# Patient Record
Sex: FEMALE | Race: WHITE | NOT HISPANIC OR LATINO | Employment: OTHER | URBAN - METROPOLITAN AREA
[De-identification: names, ages, dates, MRNs, and addresses within clinical notes are randomized per-mention and may not be internally consistent; named-entity substitution may affect disease eponyms.]

---

## 2017-01-12 ENCOUNTER — TELEPHONE (OUTPATIENT)
Dept: INTERNAL MEDICINE | Facility: CLINIC | Age: 82
End: 2017-01-12

## 2017-01-12 ENCOUNTER — CLINICAL SUPPORT (OUTPATIENT)
Dept: INTERNAL MEDICINE | Facility: CLINIC | Age: 82
End: 2017-01-12

## 2017-01-12 DIAGNOSIS — I48.91 ATRIAL FIBRILLATION, UNSPECIFIED TYPE (HCC): Primary | ICD-10-CM

## 2017-01-12 LAB — INR PPP: 3.2 (ref 2–3)

## 2017-01-12 PROCEDURE — 85610 PROTHROMBIN TIME: CPT | Performed by: INTERNAL MEDICINE

## 2017-01-23 RX ORDER — LOSARTAN POTASSIUM AND HYDROCHLOROTHIAZIDE 12.5; 1 MG/1; MG/1
TABLET ORAL
Qty: 30 TABLET | Refills: 0 | Status: SHIPPED | OUTPATIENT
Start: 2017-01-23 | End: 2017-02-18 | Stop reason: SDUPTHER

## 2017-01-23 RX ORDER — AMLODIPINE BESYLATE 5 MG/1
TABLET ORAL
Qty: 30 TABLET | Refills: 0 | Status: SHIPPED | OUTPATIENT
Start: 2017-01-23 | End: 2017-02-18 | Stop reason: SDUPTHER

## 2017-02-09 ENCOUNTER — CLINICAL SUPPORT (OUTPATIENT)
Dept: INTERNAL MEDICINE | Facility: CLINIC | Age: 82
End: 2017-02-09

## 2017-02-09 ENCOUNTER — TELEPHONE (OUTPATIENT)
Dept: INTERNAL MEDICINE | Facility: CLINIC | Age: 82
End: 2017-02-09

## 2017-02-09 DIAGNOSIS — I48.91 ATRIAL FIBRILLATION, UNSPECIFIED TYPE (HCC): Primary | ICD-10-CM

## 2017-02-09 LAB — INR PPP: 2.7 (ref 2–3)

## 2017-02-09 PROCEDURE — 85610 PROTHROMBIN TIME: CPT | Performed by: INTERNAL MEDICINE

## 2017-02-09 NOTE — TELEPHONE ENCOUNTER
She was in for her INR today and it was 2.7.  She is currently on 4 mg x 3 days and 3 mg the rest of the week.  She will stay on this dose and recheck in 4 weeks if that is ok with you.

## 2017-02-20 RX ORDER — LOSARTAN POTASSIUM AND HYDROCHLOROTHIAZIDE 12.5; 1 MG/1; MG/1
TABLET ORAL
Qty: 30 TABLET | Refills: 0 | Status: SHIPPED | OUTPATIENT
Start: 2017-02-20 | End: 2017-03-18 | Stop reason: SDUPTHER

## 2017-02-20 RX ORDER — AMLODIPINE BESYLATE 5 MG/1
TABLET ORAL
Qty: 30 TABLET | Refills: 0 | Status: SHIPPED | OUTPATIENT
Start: 2017-02-20 | End: 2017-03-18 | Stop reason: SDUPTHER

## 2017-02-20 RX ORDER — WARFARIN SODIUM 1 MG/1
TABLET ORAL
Qty: 108 TABLET | Refills: 0 | Status: SHIPPED | OUTPATIENT
Start: 2017-02-20 | End: 2017-02-27 | Stop reason: SDUPTHER

## 2017-02-27 RX ORDER — WARFARIN SODIUM 1 MG/1
1 TABLET ORAL
Qty: 108 TABLET | Refills: 0 | Status: SHIPPED | OUTPATIENT
Start: 2017-02-27 | End: 2017-04-21 | Stop reason: SDUPTHER

## 2017-03-09 ENCOUNTER — TELEPHONE (OUTPATIENT)
Dept: INTERNAL MEDICINE | Facility: CLINIC | Age: 82
End: 2017-03-09

## 2017-03-09 ENCOUNTER — CLINICAL SUPPORT (OUTPATIENT)
Dept: INTERNAL MEDICINE | Facility: CLINIC | Age: 82
End: 2017-03-09

## 2017-03-09 DIAGNOSIS — I48.91 ATRIAL FIBRILLATION, UNSPECIFIED TYPE (HCC): Primary | ICD-10-CM

## 2017-03-09 LAB — INR PPP: 2.6 (ref 2.5–3.5)

## 2017-03-09 PROCEDURE — 85610 PROTHROMBIN TIME: CPT | Performed by: INTERNAL MEDICINE

## 2017-03-09 NOTE — TELEPHONE ENCOUNTER
She was in today for INR and it was 2.6.  She is currently on 4 mg x 3 days and 3 mg the rest of the week.

## 2017-03-13 RX ORDER — PIOGLITAZONEHYDROCHLORIDE 15 MG/1
TABLET ORAL
Qty: 30 TABLET | Refills: 0 | Status: SHIPPED | OUTPATIENT
Start: 2017-03-13 | End: 2017-04-08 | Stop reason: SDUPTHER

## 2017-03-20 RX ORDER — LOSARTAN POTASSIUM AND HYDROCHLOROTHIAZIDE 12.5; 1 MG/1; MG/1
TABLET ORAL
Qty: 15 TABLET | Refills: 0 | Status: SHIPPED | OUTPATIENT
Start: 2017-03-20 | End: 2017-04-12 | Stop reason: SDUPTHER

## 2017-03-20 RX ORDER — AMLODIPINE BESYLATE 5 MG/1
TABLET ORAL
Qty: 15 TABLET | Refills: 0 | Status: SHIPPED | OUTPATIENT
Start: 2017-03-20 | End: 2017-04-12 | Stop reason: SDUPTHER

## 2017-04-05 RX ORDER — LEVOTHYROXINE SODIUM 0.03 MG/1
TABLET ORAL
Qty: 90 TABLET | Refills: 0 | Status: SHIPPED | OUTPATIENT
Start: 2017-04-05 | End: 2017-04-22

## 2017-04-06 ENCOUNTER — TELEPHONE (OUTPATIENT)
Dept: INTERNAL MEDICINE | Facility: CLINIC | Age: 82
End: 2017-04-06

## 2017-04-06 ENCOUNTER — CLINICAL SUPPORT (OUTPATIENT)
Dept: INTERNAL MEDICINE | Facility: CLINIC | Age: 82
End: 2017-04-06

## 2017-04-06 DIAGNOSIS — I48.91 ATRIAL FIBRILLATION, UNSPECIFIED TYPE (HCC): Primary | ICD-10-CM

## 2017-04-06 LAB — INR PPP: 2 (ref 2.5–3.5)

## 2017-04-06 PROCEDURE — 85610 PROTHROMBIN TIME: CPT | Performed by: INTERNAL MEDICINE

## 2017-04-06 NOTE — TELEPHONE ENCOUNTER
Yes we can leave dose the same and recheck when she comes in later this month - I see she has appt 4/21 - if she can come in fasting then we can do her fasting labs

## 2017-04-06 NOTE — TELEPHONE ENCOUNTER
She was in today for her INR and it was 2.0.  She is currently on 4 mg x 3 days and 3 mg x 4 days.  I let her know to continue same dose until I hear from you and I will give her a call.

## 2017-04-10 RX ORDER — PIOGLITAZONEHYDROCHLORIDE 15 MG/1
TABLET ORAL
Qty: 30 TABLET | Refills: 0 | Status: SHIPPED | OUTPATIENT
Start: 2017-04-10 | End: 2017-04-22 | Stop reason: SDUPTHER

## 2017-04-12 RX ORDER — LOSARTAN POTASSIUM AND HYDROCHLOROTHIAZIDE 12.5; 1 MG/1; MG/1
TABLET ORAL
Qty: 30 TABLET | Refills: 0 | Status: SHIPPED | OUTPATIENT
Start: 2017-04-12 | End: 2017-04-21 | Stop reason: SDUPTHER

## 2017-04-12 RX ORDER — AMLODIPINE BESYLATE 5 MG/1
TABLET ORAL
Qty: 30 TABLET | Refills: 0 | Status: SHIPPED | OUTPATIENT
Start: 2017-04-12 | End: 2017-04-21 | Stop reason: SDUPTHER

## 2017-04-12 RX ORDER — ATORVASTATIN CALCIUM 40 MG/1
TABLET, FILM COATED ORAL
Qty: 30 TABLET | Refills: 0 | Status: SHIPPED | OUTPATIENT
Start: 2017-04-12 | End: 2017-04-22 | Stop reason: SDUPTHER

## 2017-04-13 RX ORDER — DIGOXIN 250 MCG
TABLET ORAL
Qty: 30 TABLET | Refills: 0 | Status: SHIPPED | OUTPATIENT
Start: 2017-04-13 | End: 2017-04-21 | Stop reason: SDUPTHER

## 2017-04-20 RX ORDER — DIGOXIN 250 MCG
TABLET ORAL
Qty: 90 TABLET | Refills: 1 | OUTPATIENT
Start: 2017-04-20

## 2017-04-21 ENCOUNTER — OFFICE VISIT (OUTPATIENT)
Dept: INTERNAL MEDICINE | Facility: CLINIC | Age: 82
End: 2017-04-21

## 2017-04-21 VITALS — TEMPERATURE: 98.3 F | DIASTOLIC BLOOD PRESSURE: 62 MMHG | HEART RATE: 88 BPM | SYSTOLIC BLOOD PRESSURE: 136 MMHG

## 2017-04-21 DIAGNOSIS — I10 ESSENTIAL HYPERTENSION, BENIGN: ICD-10-CM

## 2017-04-21 DIAGNOSIS — I48.91 ATRIAL FIBRILLATION, UNSPECIFIED TYPE (HCC): Primary | ICD-10-CM

## 2017-04-21 DIAGNOSIS — E11.9 DIABETES MELLITUS WITHOUT COMPLICATION (HCC): ICD-10-CM

## 2017-04-21 DIAGNOSIS — E78.00 PURE HYPERCHOLESTEROLEMIA: ICD-10-CM

## 2017-04-21 LAB
ALBUMIN SERPL-MCNC: 4.2 G/DL (ref 3.2–4.8)
ALBUMIN/GLOB SERPL: 1.4 G/DL (ref 1.5–2.5)
ALP SERPL-CCNC: 69 U/L (ref 25–100)
ALT SERPL W P-5'-P-CCNC: 16 U/L (ref 7–40)
ANION GAP SERPL CALCULATED.3IONS-SCNC: 9 MMOL/L (ref 3–11)
ARTICHOKE IGE QN: 72 MG/DL (ref 0–130)
AST SERPL-CCNC: 18 U/L (ref 0–33)
BASOPHILS # BLD AUTO: 0.06 10*3/MM3 (ref 0–0.2)
BASOPHILS NFR BLD AUTO: 0.7 % (ref 0–1)
BILIRUB SERPL-MCNC: 0.4 MG/DL (ref 0.3–1.2)
BUN BLD-MCNC: 20 MG/DL (ref 9–23)
BUN/CREAT SERPL: 33.3 (ref 7–25)
CALCIUM SPEC-SCNC: 9.7 MG/DL (ref 8.7–10.4)
CHLORIDE SERPL-SCNC: 98 MMOL/L (ref 99–109)
CHOLEST SERPL-MCNC: 142 MG/DL (ref 0–200)
CO2 SERPL-SCNC: 31 MMOL/L (ref 20–31)
CREAT BLD-MCNC: 0.6 MG/DL (ref 0.6–1.3)
DEPRECATED RDW RBC AUTO: 48.4 FL (ref 37–54)
EOSINOPHIL # BLD AUTO: 0.23 10*3/MM3 (ref 0.1–0.3)
EOSINOPHIL NFR BLD AUTO: 2.6 % (ref 0–3)
ERYTHROCYTE [DISTWIDTH] IN BLOOD BY AUTOMATED COUNT: 13.9 % (ref 11.3–14.5)
GFR SERPL CREATININE-BSD FRML MDRD: 96 ML/MIN/1.73
GLOBULIN UR ELPH-MCNC: 2.9 GM/DL
GLUCOSE BLD-MCNC: 107 MG/DL (ref 70–100)
HBA1C MFR BLD: 5.9 % (ref 4.8–5.6)
HCT VFR BLD AUTO: 38 % (ref 34.5–44)
HDLC SERPL-MCNC: 38 MG/DL (ref 40–60)
HGB BLD-MCNC: 12.1 G/DL (ref 11.5–15.5)
IMM GRANULOCYTES # BLD: 0.02 10*3/MM3 (ref 0–0.03)
IMM GRANULOCYTES NFR BLD: 0.2 % (ref 0–0.6)
INR PPP: 2.6 (ref 2–3)
LYMPHOCYTES # BLD AUTO: 2.96 10*3/MM3 (ref 0.6–4.8)
LYMPHOCYTES NFR BLD AUTO: 33.4 % (ref 24–44)
MCH RBC QN AUTO: 30.4 PG (ref 27–31)
MCHC RBC AUTO-ENTMCNC: 31.8 G/DL (ref 32–36)
MCV RBC AUTO: 95.5 FL (ref 80–99)
MONOCYTES # BLD AUTO: 0.74 10*3/MM3 (ref 0–1)
MONOCYTES NFR BLD AUTO: 8.4 % (ref 0–12)
NEUTROPHILS # BLD AUTO: 4.85 10*3/MM3 (ref 1.5–8.3)
NEUTROPHILS NFR BLD AUTO: 54.7 % (ref 41–71)
PLATELET # BLD AUTO: 368 10*3/MM3 (ref 150–450)
PMV BLD AUTO: 9.8 FL (ref 6–12)
POTASSIUM BLD-SCNC: 3.8 MMOL/L (ref 3.5–5.5)
PROT SERPL-MCNC: 7.1 G/DL (ref 5.7–8.2)
RBC # BLD AUTO: 3.98 10*6/MM3 (ref 3.89–5.14)
SODIUM BLD-SCNC: 138 MMOL/L (ref 132–146)
TRIGL SERPL-MCNC: 154 MG/DL (ref 0–150)
TSH SERPL DL<=0.05 MIU/L-ACNC: 5.54 MIU/ML (ref 0.35–5.35)
WBC NRBC COR # BLD: 8.86 10*3/MM3 (ref 3.5–10.8)

## 2017-04-21 PROCEDURE — 99214 OFFICE O/P EST MOD 30 MIN: CPT | Performed by: INTERNAL MEDICINE

## 2017-04-21 PROCEDURE — 83036 HEMOGLOBIN GLYCOSYLATED A1C: CPT | Performed by: INTERNAL MEDICINE

## 2017-04-21 PROCEDURE — 84443 ASSAY THYROID STIM HORMONE: CPT | Performed by: INTERNAL MEDICINE

## 2017-04-21 PROCEDURE — 80061 LIPID PANEL: CPT | Performed by: INTERNAL MEDICINE

## 2017-04-21 PROCEDURE — 85025 COMPLETE CBC W/AUTO DIFF WBC: CPT | Performed by: INTERNAL MEDICINE

## 2017-04-21 PROCEDURE — 85610 PROTHROMBIN TIME: CPT | Performed by: INTERNAL MEDICINE

## 2017-04-21 PROCEDURE — 80053 COMPREHEN METABOLIC PANEL: CPT | Performed by: INTERNAL MEDICINE

## 2017-04-21 RX ORDER — DIGOXIN 250 MCG
250 TABLET ORAL
Qty: 90 TABLET | Refills: 1 | Status: SHIPPED | OUTPATIENT
Start: 2017-04-21 | End: 2017-10-12 | Stop reason: SDUPTHER

## 2017-04-21 RX ORDER — WARFARIN SODIUM 1 MG/1
1 TABLET ORAL
Qty: 108 TABLET | Refills: 5 | Status: SHIPPED | OUTPATIENT
Start: 2017-04-21 | End: 2017-10-19 | Stop reason: SDUPTHER

## 2017-04-21 RX ORDER — LOSARTAN POTASSIUM AND HYDROCHLOROTHIAZIDE 12.5; 1 MG/1; MG/1
1 TABLET ORAL DAILY
Qty: 90 TABLET | Refills: 1 | Status: SHIPPED | OUTPATIENT
Start: 2017-04-21 | End: 2017-10-18

## 2017-04-21 RX ORDER — PIOGLITAZONEHYDROCHLORIDE 15 MG/1
15 TABLET ORAL DAILY
Qty: 90 TABLET | Refills: 1 | Status: CANCELLED | OUTPATIENT
Start: 2017-04-21

## 2017-04-21 RX ORDER — ATORVASTATIN CALCIUM 40 MG/1
40 TABLET, FILM COATED ORAL NIGHTLY
Qty: 90 TABLET | Refills: 1 | Status: CANCELLED | OUTPATIENT
Start: 2017-04-21

## 2017-04-21 RX ORDER — AMLODIPINE BESYLATE 5 MG/1
5 TABLET ORAL DAILY
Qty: 90 TABLET | Refills: 1 | Status: SHIPPED | OUTPATIENT
Start: 2017-04-21 | End: 2017-09-08

## 2017-04-21 NOTE — PROGRESS NOTES
Subjective   Kristin Magdaleno is a 82 y.o. female.     History of Present Illness     H/o cva - on coumadin longterm. No new problems     DMT2 - off glyburide now and feeling ok     htn - no problems. Occasionally some swelling at end of day but not bad    She did have skin lesions removed off knee, and one on  left arm that was a skin cancer        The following portions of the patient's history were reviewed and updated as appropriate: allergies, current medications, past family history, past medical history, past social history, past surgical history and problem list.    Review of Systems   Constitutional: Positive for appetite change (slightly decreased appetite, food doesn't taste as good). Negative for activity change, fever and unexpected weight change.   Eyes: Positive for visual disturbance (macular deg. had cataract surgery but not much improvement).   Respiratory: Negative for shortness of breath.    Cardiovascular: Negative for chest pain and leg swelling.   Skin:        Skin lesions   Neurological: Positive for weakness (r sided from CVA).       Objective   Blood pressure 136/62, pulse 88, temperature 98.3 °F (36.8 °C), temperature source Temporal Artery .  Physical Exam   Constitutional: She is oriented to person, place, and time. She appears well-developed and well-nourished. No distress.   HENT:   Head: Normocephalic and atraumatic.   Eyes: Conjunctivae and EOM are normal.   Cardiovascular: Normal rate and regular rhythm.  Exam reveals no gallop and no friction rub.    Murmur heard.  Pulmonary/Chest: Effort normal and breath sounds normal. No respiratory distress. She has no wheezes.   Musculoskeletal: She exhibits no edema.   Neurological: She is alert and oriented to person, place, and time.   r sided weakness   Skin: Skin is warm and dry. She is not diaphoretic.   Psychiatric: She has a normal mood and affect. Her behavior is normal. Judgment and thought content normal.       Assessment/Plan    Kristin was seen today for med refill and coagulation disorder.    Diagnoses and all orders for this visit:    Atrial fibrillation, unspecified type  -     POC INR  -     TSH    Essential hypertension, benign  -     CBC & Differential  -     CBC Auto Differential    Diabetes mellitus without complication  -     Hemoglobin A1c    Pure hypercholesterolemia  -     Comprehensive Metabolic Panel  -     Lipid Panel    Other orders  -     warfarin (COUMADIN) 1 MG tablet; Take 1 tablet by mouth Daily. Takes 4 tablets on 3 days a week and 3 tabs 4 days a week.  -     Cancel: pioglitazone (ACTOS) 15 MG tablet; Take 1 tablet by mouth Daily.  -     Cancel: metFORMIN (GLUCOPHAGE) 1000 MG tablet; Take 2 tablets by mouth 2 (Two) Times a Day With Meals.  -     losartan-hydrochlorothiazide (HYZAAR) 100-12.5 MG per tablet; Take 1 tablet by mouth Daily.  -     digoxin (LANOXIN) 250 MCG tablet; Take 1 tablet by mouth Daily.  -     Cancel: atorvastatin (LIPITOR) 40 MG tablet; Take 1 tablet by mouth Every Night.  -     amLODIPine (NORVASC) 5 MG tablet; Take 1 tablet by mouth Daily.      vaccinationsa re UTD and she declines mams

## 2017-04-22 RX ORDER — ATORVASTATIN CALCIUM 40 MG/1
40 TABLET, FILM COATED ORAL DAILY
Qty: 90 TABLET | Refills: 1 | Status: SHIPPED | OUTPATIENT
Start: 2017-04-22 | End: 2017-10-19 | Stop reason: SDUPTHER

## 2017-04-22 RX ORDER — PIOGLITAZONEHYDROCHLORIDE 15 MG/1
15 TABLET ORAL DAILY
Qty: 90 TABLET | Refills: 1 | Status: SHIPPED | OUTPATIENT
Start: 2017-04-22 | End: 2017-10-19 | Stop reason: SDUPTHER

## 2017-04-22 RX ORDER — LEVOTHYROXINE SODIUM 0.05 MG/1
50 TABLET ORAL DAILY
Qty: 90 TABLET | Refills: 0 | Status: SHIPPED | OUTPATIENT
Start: 2017-04-22 | End: 2017-07-19 | Stop reason: SDUPTHER

## 2017-04-27 RX ORDER — WARFARIN SODIUM 1 MG/1
TABLET ORAL
Qty: 108 TABLET | Refills: 0 | OUTPATIENT
Start: 2017-04-27

## 2017-05-18 ENCOUNTER — TELEPHONE (OUTPATIENT)
Dept: INTERNAL MEDICINE | Facility: CLINIC | Age: 82
End: 2017-05-18

## 2017-05-18 ENCOUNTER — CLINICAL SUPPORT (OUTPATIENT)
Dept: INTERNAL MEDICINE | Facility: CLINIC | Age: 82
End: 2017-05-18

## 2017-05-18 DIAGNOSIS — I48.91 ATRIAL FIBRILLATION, UNSPECIFIED TYPE (HCC): Primary | ICD-10-CM

## 2017-05-18 LAB — INR PPP: 2.6 (ref 2–3)

## 2017-05-18 PROCEDURE — 85610 PROTHROMBIN TIME: CPT | Performed by: INTERNAL MEDICINE

## 2017-06-15 ENCOUNTER — CLINICAL SUPPORT (OUTPATIENT)
Dept: INTERNAL MEDICINE | Facility: CLINIC | Age: 82
End: 2017-06-15

## 2017-06-15 ENCOUNTER — TELEPHONE (OUTPATIENT)
Dept: INTERNAL MEDICINE | Facility: CLINIC | Age: 82
End: 2017-06-15

## 2017-06-15 DIAGNOSIS — I48.91 ATRIAL FIBRILLATION, UNSPECIFIED TYPE (HCC): Primary | ICD-10-CM

## 2017-06-15 LAB — INR PPP: 2.1 (ref 2–3)

## 2017-06-15 PROCEDURE — 85610 PROTHROMBIN TIME: CPT | Performed by: INTERNAL MEDICINE

## 2017-07-13 ENCOUNTER — TELEPHONE (OUTPATIENT)
Dept: INTERNAL MEDICINE | Facility: CLINIC | Age: 82
End: 2017-07-13

## 2017-07-13 ENCOUNTER — CLINICAL SUPPORT (OUTPATIENT)
Dept: INTERNAL MEDICINE | Facility: CLINIC | Age: 82
End: 2017-07-13

## 2017-07-13 LAB — INR PPP: 2.2 (ref 2.5–3.5)

## 2017-07-13 PROCEDURE — 85610 PROTHROMBIN TIME: CPT | Performed by: INTERNAL MEDICINE

## 2017-07-13 NOTE — TELEPHONE ENCOUNTER
----- Message from Maryann Guillaume MD sent at 7/13/2017  9:27 AM EDT -----  Regarding: RE: INR  No that sounds good, thanks  ----- Message -----     From: Elodia Padilla MA     Sent: 7/13/2017   9:21 AM       To: Maryann Guillaume MD  Subject: INR                                              Patient present for INR visit. INR is 2.2. She has been taking 4mg x3 days and 3mg the rest of the week. I told her to continue same does and recheck again in 4 weeks unless you want to change anything and then I will give them a call

## 2017-07-20 RX ORDER — LEVOTHYROXINE SODIUM 0.05 MG/1
TABLET ORAL
Qty: 30 TABLET | Refills: 0 | Status: SHIPPED | OUTPATIENT
Start: 2017-07-20 | End: 2017-08-10

## 2017-08-10 ENCOUNTER — CLINICAL SUPPORT (OUTPATIENT)
Dept: INTERNAL MEDICINE | Facility: CLINIC | Age: 82
End: 2017-08-10

## 2017-08-10 DIAGNOSIS — Z79.899 ENCOUNTER FOR LONG-TERM (CURRENT) USE OF MEDICATIONS: ICD-10-CM

## 2017-08-10 DIAGNOSIS — I48.91 ATRIAL FIBRILLATION, UNSPECIFIED TYPE (HCC): Primary | ICD-10-CM

## 2017-08-10 DIAGNOSIS — E03.9 HYPOTHYROIDISM, UNSPECIFIED TYPE: ICD-10-CM

## 2017-08-10 LAB
ALBUMIN SERPL-MCNC: 4.1 G/DL (ref 3.2–4.8)
ALBUMIN/GLOB SERPL: 1.5 G/DL (ref 1.5–2.5)
ALP SERPL-CCNC: 74 U/L (ref 25–100)
ALT SERPL W P-5'-P-CCNC: 17 U/L (ref 7–40)
ANION GAP SERPL CALCULATED.3IONS-SCNC: 6 MMOL/L (ref 3–11)
AST SERPL-CCNC: 22 U/L (ref 0–33)
BILIRUB SERPL-MCNC: 0.4 MG/DL (ref 0.3–1.2)
BUN BLD-MCNC: 21 MG/DL (ref 9–23)
BUN/CREAT SERPL: 30 (ref 7–25)
CALCIUM SPEC-SCNC: 9.4 MG/DL (ref 8.7–10.4)
CHLORIDE SERPL-SCNC: 102 MMOL/L (ref 99–109)
CO2 SERPL-SCNC: 32 MMOL/L (ref 20–31)
CREAT BLD-MCNC: 0.7 MG/DL (ref 0.6–1.3)
GFR SERPL CREATININE-BSD FRML MDRD: 81 ML/MIN/1.73
GLOBULIN UR ELPH-MCNC: 2.8 GM/DL
GLUCOSE BLD-MCNC: 98 MG/DL (ref 70–100)
INR PPP: 2.6 (ref 2.5–3.5)
POTASSIUM BLD-SCNC: 4.1 MMOL/L (ref 3.5–5.5)
PROT SERPL-MCNC: 6.9 G/DL (ref 5.7–8.2)
SODIUM BLD-SCNC: 140 MMOL/L (ref 132–146)
TSH SERPL DL<=0.05 MIU/L-ACNC: 5.55 MIU/ML (ref 0.35–5.35)

## 2017-08-10 PROCEDURE — 80053 COMPREHEN METABOLIC PANEL: CPT | Performed by: INTERNAL MEDICINE

## 2017-08-10 PROCEDURE — 84443 ASSAY THYROID STIM HORMONE: CPT | Performed by: INTERNAL MEDICINE

## 2017-08-10 PROCEDURE — 85610 PROTHROMBIN TIME: CPT | Performed by: INTERNAL MEDICINE

## 2017-08-10 RX ORDER — LEVOTHYROXINE SODIUM 0.07 MG/1
75 TABLET ORAL DAILY
Qty: 90 TABLET | Refills: 0 | Status: SHIPPED | OUTPATIENT
Start: 2017-08-10 | End: 2017-09-08

## 2017-08-11 ENCOUNTER — TELEPHONE (OUTPATIENT)
Dept: INTERNAL MEDICINE | Facility: CLINIC | Age: 82
End: 2017-08-11

## 2017-08-11 NOTE — TELEPHONE ENCOUNTER
----- Message from Maryann Guillaume MD sent at 8/10/2017 10:26 PM EDT -----  Can you let her know, the thyroid level is still a little low, so I raised the thyroid to 75, and we can recheck all her fasting labs in 3 months

## 2017-08-30 ENCOUNTER — TELEPHONE (OUTPATIENT)
Dept: INTERNAL MEDICINE | Facility: CLINIC | Age: 82
End: 2017-08-30

## 2017-08-30 NOTE — TELEPHONE ENCOUNTER
[facility] called stating that Ms. Magdaleno legs are swollen and she does not know if it is because the increase of levothyroxine ,she would like a call back regarding matter, she is scheduled to come in 9/8/2017.

## 2017-08-30 NOTE — TELEPHONE ENCOUNTER
Sara said her legs and feet are swelling.  She seen podiatry this morning Dr. James and he removed her toe nail due to fluid build up.  Sara is wondering if she should be decreased on the levothyroxine or stop it completely.

## 2017-09-08 ENCOUNTER — OFFICE VISIT (OUTPATIENT)
Dept: INTERNAL MEDICINE | Facility: CLINIC | Age: 82
End: 2017-09-08

## 2017-09-08 VITALS
TEMPERATURE: 98.3 F | DIASTOLIC BLOOD PRESSURE: 76 MMHG | OXYGEN SATURATION: 97 % | HEART RATE: 94 BPM | SYSTOLIC BLOOD PRESSURE: 128 MMHG

## 2017-09-08 DIAGNOSIS — R73.09 ELEVATED GLUCOSE: ICD-10-CM

## 2017-09-08 DIAGNOSIS — Z23 NEED FOR TDAP VACCINATION: ICD-10-CM

## 2017-09-08 DIAGNOSIS — R60.0 BILATERAL EDEMA OF LOWER EXTREMITY: Primary | ICD-10-CM

## 2017-09-08 DIAGNOSIS — I48.20 CHRONIC ATRIAL FIBRILLATION (HCC): ICD-10-CM

## 2017-09-08 DIAGNOSIS — I87.2 VENOUS STASIS DERMATITIS OF BOTH LOWER EXTREMITIES: ICD-10-CM

## 2017-09-08 DIAGNOSIS — Z23 NEED FOR INFLUENZA VACCINATION: ICD-10-CM

## 2017-09-08 DIAGNOSIS — I10 ESSENTIAL HYPERTENSION: ICD-10-CM

## 2017-09-08 LAB
HBA1C MFR BLD: 5.9 %
INR PPP: 2.3 (ref 2–3)

## 2017-09-08 PROCEDURE — G0008 ADMIN INFLUENZA VIRUS VAC: HCPCS | Performed by: INTERNAL MEDICINE

## 2017-09-08 PROCEDURE — 85610 PROTHROMBIN TIME: CPT | Performed by: INTERNAL MEDICINE

## 2017-09-08 PROCEDURE — 90662 IIV NO PRSV INCREASED AG IM: CPT | Performed by: INTERNAL MEDICINE

## 2017-09-08 PROCEDURE — 83036 HEMOGLOBIN GLYCOSYLATED A1C: CPT | Performed by: INTERNAL MEDICINE

## 2017-09-08 PROCEDURE — 99214 OFFICE O/P EST MOD 30 MIN: CPT | Performed by: INTERNAL MEDICINE

## 2017-09-08 RX ORDER — HYDROCHLOROTHIAZIDE 12.5 MG/1
12.5 CAPSULE, GELATIN COATED ORAL DAILY
Qty: 30 CAPSULE | Refills: 5 | Status: SHIPPED | OUTPATIENT
Start: 2017-09-08 | End: 2017-10-18

## 2017-09-08 NOTE — PROGRESS NOTES
Subjective   Kristin Magdaleno is a 82 y.o. female.     Leg Swelling   This is a new problem. Episode onset: over last 3-4 months. The problem occurs constantly. The problem has been gradually worsening. Pertinent negatives include no chest pain or coughing. She has tried nothing for the symptoms.   no CP or palpitations, no increase in SOB, but she does very little physically - on couch or wheelchair most of the time. No paroxysmal nocturnal dyspnea, but she has slept upright on couch since her stroke because she feels like she would smother if she slept flat. She props up her legs on a chair. She did buy compression stockings but can't wear them - were too hard to put on.     htn - has not checked BP in some time. Does have a cuff though    Hypothyroid - she never has had any symptoms of hypothyroid. Her daughter feels like her weight is down and pt doesn't feel any better in terms of energy on it      The following portions of the patient's history were reviewed and updated as appropriate: allergies, current medications, past family history, past medical history, past social history, past surgical history and problem list.    Review of Systems   Constitutional: Positive for unexpected weight change (doesn't weigh, but daughter feels like hse has lost weight). Negative for activity change and appetite change.   Respiratory: Negative for cough, chest tightness, shortness of breath and wheezing.    Cardiovascular: Positive for leg swelling. Negative for chest pain and palpitations.       Objective   Blood pressure 128/76, pulse 94, temperature 98.3 °F (36.8 °C), temperature source Temporal Artery , SpO2 97 %.  Physical Exam   Constitutional: She is oriented to person, place, and time. She appears well-developed and well-nourished. No distress.   HENT:   Head: Normocephalic and atraumatic.   Eyes: Conjunctivae and EOM are normal.   Cardiovascular: Normal rate and regular rhythm.  Exam reveals no gallop and no friction  rub.    Murmur heard.  Pulmonary/Chest: Effort normal and breath sounds normal. No respiratory distress. She has no wheezes.   Abdominal: Soft. Bowel sounds are normal.   Musculoskeletal: She exhibits edema (2 + pitting edema B).   Neurological: She is alert and oriented to person, place, and time.   r sided weakness   Skin: Skin is warm and dry. She is not diaphoretic. There is erythema (B shins).   Psychiatric: She has a normal mood and affect. Her behavior is normal. Judgment and thought content normal.   pulse 100, regular  a1c - 5.9    Assessment/Plan   Kristin was seen today for leg swelling and atrial fibrillation.    Diagnoses and all orders for this visit:    Bilateral edema of lower extremity -Will hold the amlodipoine and add HCTZ 12.5 daily to the losartan/hctz. Will have her get a recliner and try to keep legs propped up more both during the day and at night. RTC in 1 month and if no better, we may stop the actos as well. We discussed getting echo, but she declines - she does not want any testing done other than labs      Need for influenza vaccination  -     Flu Vaccine High Dose PF 65YR+ (FLUZONE [id number])  -     POC INR    Elevated glucose  -     POC Glycosylated Hemoglobin (Hb A1C)    Venous stasis dermatitis of both lower extremities - will use lidex    Essential hypertension - try to monitor 2-3x/week w/ changes we are making      She wants tdap as she has a great grandbaby on the way, but w/ medicare she will get at pharmacy

## 2017-09-15 ENCOUNTER — TELEPHONE (OUTPATIENT)
Dept: INTERNAL MEDICINE | Facility: CLINIC | Age: 82
End: 2017-09-15

## 2017-09-15 NOTE — TELEPHONE ENCOUNTER
----- Message from Maryann Guillaume MD sent at 9/15/2017  7:09 AM EDT -----  Regarding: bactrim  We got a message that she has been started on Bactrim by her podiatrist. This can interact w/ her coumading. Can she come in today for an INR?

## 2017-09-15 NOTE — TELEPHONE ENCOUNTER
Mrs. Magdaleno said she was not started on any antibiotic.  She said Sara called the podiatrist and he said she did not need one.  I did not see a number for Sara in the chart.

## 2017-09-19 ENCOUNTER — TELEPHONE (OUTPATIENT)
Dept: INTERNAL MEDICINE | Facility: CLINIC | Age: 82
End: 2017-09-19

## 2017-09-19 NOTE — TELEPHONE ENCOUNTER
Spoke with  and he said there is not anything interacting with her warfarin.  I have noted the TDAP in the chart.

## 2017-09-19 NOTE — TELEPHONE ENCOUNTER
PT HAD TDAP DONE OVER WEEKEND AT [facility], ALSO DAUGHTER WOULD LIKE A CALL BACK SOMETHING IS INTERACTING WITH MRS CHRISSY BUTT AND SHE DOES NOT KNOW WHAT TO DO.

## 2017-10-04 ENCOUNTER — TELEPHONE (OUTPATIENT)
Dept: INTERNAL MEDICINE | Facility: CLINIC | Age: 82
End: 2017-10-04

## 2017-10-04 ENCOUNTER — CLINICAL SUPPORT (OUTPATIENT)
Dept: INTERNAL MEDICINE | Facility: CLINIC | Age: 82
End: 2017-10-04

## 2017-10-04 LAB — INR PPP: 2 (ref 2.5–3.5)

## 2017-10-04 PROCEDURE — 85610 PROTHROMBIN TIME: CPT | Performed by: INTERNAL MEDICINE

## 2017-10-04 NOTE — TELEPHONE ENCOUNTER
----- Message from Maryann Guillaume MD sent at 10/4/2017  9:44 AM EDT -----  No change, recheck in 4 weeks  ----- Message -----     From: Elodia Padilla MA     Sent: 10/4/2017   9:38 AM       To: Maryann Guillaume MD    Patient came in today for her INR check. INR is 2.0. She has been taking 4mg 3 days a week and 3mg 4 days a week. How would you like to proceed?

## 2017-10-12 RX ORDER — DIGOXIN 250 MCG
TABLET ORAL
Qty: 90 TABLET | Refills: 1 | Status: SHIPPED | OUTPATIENT
Start: 2017-10-12 | End: 2018-01-01 | Stop reason: SDUPTHER

## 2017-10-16 ENCOUNTER — TELEPHONE (OUTPATIENT)
Dept: INTERNAL MEDICINE | Facility: CLINIC | Age: 82
End: 2017-10-16

## 2017-10-16 NOTE — TELEPHONE ENCOUNTER
Pt has appt [id number] her daughter called wanting to know if she has to fast please call and let her know

## 2017-10-18 ENCOUNTER — OFFICE VISIT (OUTPATIENT)
Dept: INTERNAL MEDICINE | Facility: CLINIC | Age: 82
End: 2017-10-18

## 2017-10-18 VITALS
DIASTOLIC BLOOD PRESSURE: 70 MMHG | SYSTOLIC BLOOD PRESSURE: 178 MMHG | TEMPERATURE: 98 F | HEART RATE: 94 BPM | OXYGEN SATURATION: 93 %

## 2017-10-18 DIAGNOSIS — E78.00 PURE HYPERCHOLESTEROLEMIA: ICD-10-CM

## 2017-10-18 DIAGNOSIS — I10 ESSENTIAL HYPERTENSION, BENIGN: Primary | ICD-10-CM

## 2017-10-18 DIAGNOSIS — E03.8 OTHER SPECIFIED HYPOTHYROIDISM: ICD-10-CM

## 2017-10-18 LAB
ALBUMIN SERPL-MCNC: 4.3 G/DL (ref 3.2–4.8)
ALBUMIN/GLOB SERPL: 1.5 G/DL (ref 1.5–2.5)
ALP SERPL-CCNC: 73 U/L (ref 25–100)
ALT SERPL W P-5'-P-CCNC: 12 U/L (ref 7–40)
ANION GAP SERPL CALCULATED.3IONS-SCNC: 7 MMOL/L (ref 3–11)
ARTICHOKE IGE QN: 77 MG/DL (ref 0–130)
AST SERPL-CCNC: 15 U/L (ref 0–33)
BASOPHILS # BLD AUTO: 0.05 10*3/MM3 (ref 0–0.2)
BASOPHILS NFR BLD AUTO: 0.5 % (ref 0–1)
BILIRUB SERPL-MCNC: 0.5 MG/DL (ref 0.3–1.2)
BUN BLD-MCNC: 16 MG/DL (ref 9–23)
BUN/CREAT SERPL: 22.9 (ref 7–25)
CALCIUM SPEC-SCNC: 9.6 MG/DL (ref 8.7–10.4)
CHLORIDE SERPL-SCNC: 99 MMOL/L (ref 99–109)
CHOLEST SERPL-MCNC: 136 MG/DL (ref 0–200)
CO2 SERPL-SCNC: 34 MMOL/L (ref 20–31)
CREAT BLD-MCNC: 0.7 MG/DL (ref 0.6–1.3)
DEPRECATED RDW RBC AUTO: 50.9 FL (ref 37–54)
EOSINOPHIL # BLD AUTO: 0.27 10*3/MM3 (ref 0–0.3)
EOSINOPHIL NFR BLD AUTO: 2.6 % (ref 0–3)
ERYTHROCYTE [DISTWIDTH] IN BLOOD BY AUTOMATED COUNT: 14.3 % (ref 11.3–14.5)
GFR SERPL CREATININE-BSD FRML MDRD: 81 ML/MIN/1.73
GLOBULIN UR ELPH-MCNC: 2.8 GM/DL
GLUCOSE BLD-MCNC: 89 MG/DL (ref 70–100)
HCT VFR BLD AUTO: 39.4 % (ref 34.5–44)
HDLC SERPL-MCNC: 36 MG/DL (ref 40–60)
HGB BLD-MCNC: 12.5 G/DL (ref 11.5–15.5)
IMM GRANULOCYTES # BLD: 0.04 10*3/MM3 (ref 0–0.03)
IMM GRANULOCYTES NFR BLD: 0.4 % (ref 0–0.6)
LYMPHOCYTES # BLD AUTO: 2.85 10*3/MM3 (ref 0.6–4.8)
LYMPHOCYTES NFR BLD AUTO: 27.2 % (ref 24–44)
MCH RBC QN AUTO: 30.6 PG (ref 27–31)
MCHC RBC AUTO-ENTMCNC: 31.7 G/DL (ref 32–36)
MCV RBC AUTO: 96.3 FL (ref 80–99)
MONOCYTES # BLD AUTO: 0.75 10*3/MM3 (ref 0–1)
MONOCYTES NFR BLD AUTO: 7.1 % (ref 0–12)
NEUTROPHILS # BLD AUTO: 6.53 10*3/MM3 (ref 1.5–8.3)
NEUTROPHILS NFR BLD AUTO: 62.2 % (ref 41–71)
PLATELET # BLD AUTO: 407 10*3/MM3 (ref 150–450)
PMV BLD AUTO: 9.8 FL (ref 6–12)
POTASSIUM BLD-SCNC: 4.1 MMOL/L (ref 3.5–5.5)
PROT SERPL-MCNC: 7.1 G/DL (ref 5.7–8.2)
RBC # BLD AUTO: 4.09 10*6/MM3 (ref 3.89–5.14)
SODIUM BLD-SCNC: 140 MMOL/L (ref 132–146)
TRIGL SERPL-MCNC: 152 MG/DL (ref 0–150)
WBC NRBC COR # BLD: 10.49 10*3/MM3 (ref 3.5–10.8)

## 2017-10-18 PROCEDURE — 80061 LIPID PANEL: CPT | Performed by: INTERNAL MEDICINE

## 2017-10-18 PROCEDURE — 80053 COMPREHEN METABOLIC PANEL: CPT | Performed by: INTERNAL MEDICINE

## 2017-10-18 PROCEDURE — 85025 COMPLETE CBC W/AUTO DIFF WBC: CPT | Performed by: INTERNAL MEDICINE

## 2017-10-18 PROCEDURE — 99214 OFFICE O/P EST MOD 30 MIN: CPT | Performed by: INTERNAL MEDICINE

## 2017-10-18 RX ORDER — WARFARIN SODIUM 1 MG/1
1 TABLET ORAL
Qty: 108 TABLET | Refills: 5 | Status: CANCELLED | OUTPATIENT
Start: 2017-10-18

## 2017-10-18 RX ORDER — PIOGLITAZONEHYDROCHLORIDE 15 MG/1
15 TABLET ORAL DAILY
Qty: 90 TABLET | Refills: 1 | Status: CANCELLED | OUTPATIENT
Start: 2017-10-18

## 2017-10-18 RX ORDER — METOPROLOL SUCCINATE 25 MG/1
25 TABLET, EXTENDED RELEASE ORAL DAILY
Qty: 30 TABLET | Refills: 5 | Status: SHIPPED | OUTPATIENT
Start: 2017-10-18 | End: 2018-02-10 | Stop reason: HOSPADM

## 2017-10-18 RX ORDER — LOSARTAN POTASSIUM AND HYDROCHLOROTHIAZIDE 25; 100 MG/1; MG/1
1 TABLET ORAL DAILY
Qty: 90 TABLET | Refills: 1 | Status: SHIPPED | OUTPATIENT
Start: 2017-10-18 | End: 2018-01-01 | Stop reason: SDUPTHER

## 2017-10-18 RX ORDER — ATORVASTATIN CALCIUM 40 MG/1
40 TABLET, FILM COATED ORAL DAILY
Qty: 90 TABLET | Refills: 1 | Status: CANCELLED | OUTPATIENT
Start: 2017-10-18

## 2017-10-18 NOTE — PROGRESS NOTES
Subjective   Kristin Magdaleno is a 82 y.o. female.     History of Present Illness   pt here for a 1 month f/u on edema, htn, and hypothyroid. she has held the amlodipine and the levothyroxine since last visit and swelling is much better. She has a recliner now, and that has helped as well    HTN - BP has been up the few times she has checked. the systolic has been in the 150s. She hears her pulse in her ear as well since stopping the amlodipine    Hypothyroid - can't tell she is off the synthroid    Hyperlipidemia - she is fasting today      The following portions of the patient's history were reviewed and updated as appropriate: allergies, current medications, past family history, past medical history, past social history, past surgical history and problem list.    Review of Systems   Constitutional: Positive for unexpected weight change (doesn't weigh, but daughter feels like hse has lost weight). Negative for activity change and appetite change.   Respiratory: Negative for cough, chest tightness, shortness of breath and wheezing.    Cardiovascular: Positive for leg swelling. Negative for chest pain and palpitations.   Skin: Positive for color change (shin still red, but somewhat better w/ the steroid cream).       Objective   Blood pressure 178/70, pulse 94, temperature 98 °F (36.7 °C), temperature source Temporal Artery , SpO2 93 %.  Physical Exam   Constitutional: She is oriented to person, place, and time. She appears well-developed and well-nourished. No distress.   HENT:   Head: Normocephalic and atraumatic.   Eyes: Conjunctivae and EOM are normal.   Cardiovascular: Normal rate.  Exam reveals no gallop and no friction rub.    Murmur heard.  irreg   Pulmonary/Chest: Effort normal and breath sounds normal. No respiratory distress. She has no wheezes.   Abdominal: Soft. Bowel sounds are normal.   Musculoskeletal: She exhibits edema (1+ B edema - better).   Neurological: She is alert and oriented to person,  place, and time.   r sided weakness   Skin: Skin is warm and dry. She is not diaphoretic. There is erythema (B shins. not as erythemaotus).   Psychiatric: She has a normal mood and affect. Her behavior is normal. Judgment and thought content normal.   pulse in 90s, irreg    Assessment/Plan   Kristin was seen today for med refill, hyperlipidemia, hypertension and leg swelling.    Diagnoses and all orders for this visit:    Essential hypertension, benign - BP is up off the amlo despite the increase in the HCT. We will add toprol xl. Start 1/2 qd x 1 week, and can raise to 1 tab if bp stays over 140. Check bp 2x/week, bring readings in to INR check in 2 weeks   -     CBC & Differential  -     CBC Auto Differential  -     metoprolol succinate XL (TOPROL XL) 25 MG 24 hr tablet; Take 1 tablet by mouth Daily.  -     losartan-hydrochlorothiazide (HYZAAR) 100-25 MG per tablet; Take 1 tablet by mouth Daily. - will raise this and d/c the hctz      Pure hypercholesterolemia  -     Lipid Panel  -     Comprehensive Metabolic Panel    Other specified hypothyroidism - off synthroid. Probably won't recheck TSH unless symptoms develop

## 2017-10-19 RX ORDER — WARFARIN SODIUM 1 MG/1
1 TABLET ORAL
Qty: 108 TABLET | Refills: 5 | Status: SHIPPED | OUTPATIENT
Start: 2017-10-19 | End: 2018-02-04

## 2017-10-19 RX ORDER — PIOGLITAZONEHYDROCHLORIDE 15 MG/1
15 TABLET ORAL DAILY
Qty: 90 TABLET | Refills: 1 | Status: SHIPPED | OUTPATIENT
Start: 2017-10-19 | End: 2018-03-05

## 2017-10-19 RX ORDER — ATORVASTATIN CALCIUM 40 MG/1
40 TABLET, FILM COATED ORAL DAILY
Qty: 90 TABLET | Refills: 1 | Status: SHIPPED | OUTPATIENT
Start: 2017-10-19 | End: 2018-01-01 | Stop reason: SDUPTHER

## 2017-10-25 ENCOUNTER — TELEPHONE (OUTPATIENT)
Dept: INTERNAL MEDICINE | Facility: CLINIC | Age: 82
End: 2017-10-25

## 2017-11-01 ENCOUNTER — CLINICAL SUPPORT (OUTPATIENT)
Dept: INTERNAL MEDICINE | Facility: CLINIC | Age: 82
End: 2017-11-01

## 2017-11-01 LAB — INR PPP: 4.3 (ref 2–3)

## 2017-11-01 PROCEDURE — 85610 PROTHROMBIN TIME: CPT | Performed by: INTERNAL MEDICINE

## 2017-11-01 RX ORDER — AMLODIPINE BESYLATE 5 MG/1
TABLET ORAL
Qty: 90 TABLET | Refills: 1 | OUTPATIENT
Start: 2017-11-01

## 2017-11-01 RX ORDER — LOSARTAN POTASSIUM AND HYDROCHLOROTHIAZIDE 12.5; 1 MG/1; MG/1
TABLET ORAL
Qty: 90 TABLET | Refills: 1 | OUTPATIENT
Start: 2017-11-01

## 2017-11-01 NOTE — TELEPHONE ENCOUNTER
----- Message from Maryann Guillaume MD sent at 11/1/2017  9:59 AM EDT -----      ----- Message -----     From: Ning Segura MA     Sent: 11/1/2017   9:14 AM       To: Maryann Guillaume MD    INR was 4.3 today.  She is currently taking 4mg x 3 days and 3mg x 4 days.  Her BP is going up and down.  Her  says it has been staying 150's/60's range.

## 2017-11-01 NOTE — PROGRESS NOTES
Let's have her hold the coumadin tonight, then go down to 3mg every night and recheck in 1 week.  For her BP, if she is taking the whole tablet of the metoprolol, can go up to 1 1/2 tablets daily.

## 2017-11-01 NOTE — TELEPHONE ENCOUNTER
Notified Mirna Rasta and she is taking just 1/2 of the metoprolol and will go up to the whole pill.

## 2017-11-08 ENCOUNTER — CLINICAL SUPPORT (OUTPATIENT)
Dept: INTERNAL MEDICINE | Facility: CLINIC | Age: 82
End: 2017-11-08

## 2017-11-08 DIAGNOSIS — I48.91 ATRIAL FIBRILLATION, UNSPECIFIED TYPE (HCC): Primary | ICD-10-CM

## 2017-11-08 LAB — INR PPP: 2.4 (ref 2.5–3.5)

## 2017-11-08 PROCEDURE — 85610 PROTHROMBIN TIME: CPT | Performed by: INTERNAL MEDICINE

## 2017-12-07 ENCOUNTER — CLINICAL SUPPORT (OUTPATIENT)
Dept: INTERNAL MEDICINE | Facility: CLINIC | Age: 82
End: 2017-12-07

## 2017-12-07 LAB — INR PPP: 2.2 (ref 2–3)

## 2017-12-07 PROCEDURE — 85610 PROTHROMBIN TIME: CPT | Performed by: INTERNAL MEDICINE

## 2017-12-19 ENCOUNTER — TELEPHONE (OUTPATIENT)
Dept: INTERNAL MEDICINE | Facility: CLINIC | Age: 82
End: 2017-12-19

## 2017-12-19 RX ORDER — TRAZODONE HYDROCHLORIDE 50 MG/1
50 TABLET ORAL NIGHTLY
Qty: 90 TABLET | Refills: 3 | Status: SHIPPED | OUTPATIENT
Start: 2017-12-19 | End: 2019-01-01 | Stop reason: SDUPTHER

## 2018-01-01 ENCOUNTER — TELEPHONE (OUTPATIENT)
Dept: INTERNAL MEDICINE | Facility: CLINIC | Age: 83
End: 2018-01-01

## 2018-01-01 ENCOUNTER — CLINICAL SUPPORT (OUTPATIENT)
Dept: INTERNAL MEDICINE | Facility: CLINIC | Age: 83
End: 2018-01-01

## 2018-01-01 ENCOUNTER — PATIENT OUTREACH (OUTPATIENT)
Dept: CASE MANAGEMENT | Facility: OTHER | Age: 83
End: 2018-01-01

## 2018-01-01 ENCOUNTER — OFFICE VISIT (OUTPATIENT)
Dept: INTERNAL MEDICINE | Facility: CLINIC | Age: 83
End: 2018-01-01

## 2018-01-01 ENCOUNTER — EPISODE CHANGES (OUTPATIENT)
Dept: CASE MANAGEMENT | Facility: OTHER | Age: 83
End: 2018-01-01

## 2018-01-01 VITALS
TEMPERATURE: 97.5 F | OXYGEN SATURATION: 98 % | BODY MASS INDEX: 17.43 KG/M2 | DIASTOLIC BLOOD PRESSURE: 68 MMHG | SYSTOLIC BLOOD PRESSURE: 140 MMHG | HEART RATE: 83 BPM | HEIGHT: 70 IN

## 2018-01-01 VITALS
TEMPERATURE: 99.4 F | OXYGEN SATURATION: 97 % | HEART RATE: 68 BPM | SYSTOLIC BLOOD PRESSURE: 132 MMHG | DIASTOLIC BLOOD PRESSURE: 82 MMHG

## 2018-01-01 VITALS
DIASTOLIC BLOOD PRESSURE: 88 MMHG | SYSTOLIC BLOOD PRESSURE: 150 MMHG | OXYGEN SATURATION: 96 % | TEMPERATURE: 99 F | HEART RATE: 94 BPM

## 2018-01-01 DIAGNOSIS — I48.91 ATRIAL FIBRILLATION, UNSPECIFIED TYPE (HCC): Primary | ICD-10-CM

## 2018-01-01 DIAGNOSIS — E78.00 PURE HYPERCHOLESTEROLEMIA: ICD-10-CM

## 2018-01-01 DIAGNOSIS — E11.9 DIABETES MELLITUS WITHOUT COMPLICATION (HCC): ICD-10-CM

## 2018-01-01 DIAGNOSIS — I63.00 CEREBROVASCULAR ACCIDENT (CVA) DUE TO THROMBOSIS OF PRECEREBRAL ARTERY (HCC): ICD-10-CM

## 2018-01-01 DIAGNOSIS — I10 ESSENTIAL HYPERTENSION, BENIGN: Primary | ICD-10-CM

## 2018-01-01 DIAGNOSIS — R60.0 LOWER EXTREMITY EDEMA: ICD-10-CM

## 2018-01-01 DIAGNOSIS — I48.0 PAROXYSMAL ATRIAL FIBRILLATION (HCC): ICD-10-CM

## 2018-01-01 DIAGNOSIS — I48.20 CHRONIC ATRIAL FIBRILLATION (HCC): ICD-10-CM

## 2018-01-01 DIAGNOSIS — I10 ESSENTIAL HYPERTENSION, BENIGN: ICD-10-CM

## 2018-01-01 DIAGNOSIS — Z23 NEED FOR INFLUENZA VACCINATION: ICD-10-CM

## 2018-01-01 LAB
ALBUMIN SERPL-MCNC: 4.23 G/DL (ref 3.2–4.8)
ALBUMIN SERPL-MCNC: 4.3 G/DL (ref 3.2–4.8)
ALBUMIN/GLOB SERPL: 1.5 G/DL (ref 1.5–2.5)
ALBUMIN/GLOB SERPL: 2 G/DL (ref 1.5–2.5)
ALP SERPL-CCNC: 57 U/L (ref 25–100)
ALP SERPL-CCNC: 74 U/L (ref 25–100)
ALT SERPL W P-5'-P-CCNC: 18 U/L (ref 7–40)
ALT SERPL W P-5'-P-CCNC: 23 U/L (ref 7–40)
ANION GAP SERPL CALCULATED.3IONS-SCNC: 11 MMOL/L (ref 3–11)
ANION GAP SERPL CALCULATED.3IONS-SCNC: 8 MMOL/L (ref 3–11)
ARTICHOKE IGE QN: 60 MG/DL (ref 0–130)
ARTICHOKE IGE QN: 66 MG/DL (ref 0–130)
AST SERPL-CCNC: 20 U/L (ref 0–33)
AST SERPL-CCNC: 21 U/L (ref 0–33)
BASOPHILS # BLD AUTO: 0.05 10*3/MM3 (ref 0–0.2)
BASOPHILS NFR BLD AUTO: 0.6 % (ref 0–1)
BILIRUB SERPL-MCNC: 0.4 MG/DL (ref 0.3–1.2)
BILIRUB SERPL-MCNC: 0.4 MG/DL (ref 0.3–1.2)
BUN BLD-MCNC: 25 MG/DL (ref 9–23)
BUN BLD-MCNC: 26 MG/DL (ref 9–23)
BUN/CREAT SERPL: 31.6 (ref 7–25)
BUN/CREAT SERPL: 32.5 (ref 7–25)
CALCIUM SPEC-SCNC: 9.1 MG/DL (ref 8.7–10.4)
CALCIUM SPEC-SCNC: 9.2 MG/DL (ref 8.7–10.4)
CHLORIDE SERPL-SCNC: 100 MMOL/L (ref 99–109)
CHLORIDE SERPL-SCNC: 101 MMOL/L (ref 99–109)
CHOLEST SERPL-MCNC: 111 MG/DL (ref 0–200)
CHOLEST SERPL-MCNC: 131 MG/DL (ref 0–200)
CO2 SERPL-SCNC: 29 MMOL/L (ref 20–31)
CO2 SERPL-SCNC: 32 MMOL/L (ref 20–31)
CREAT BLD-MCNC: 0.79 MG/DL (ref 0.6–1.3)
CREAT BLD-MCNC: 0.8 MG/DL (ref 0.6–1.3)
DEPRECATED RDW RBC AUTO: 48.1 FL (ref 37–54)
DEPRECATED RDW RBC AUTO: 49.5 FL (ref 37–54)
EOSINOPHIL # BLD AUTO: 0.18 10*3/MM3 (ref 0–0.3)
EOSINOPHIL NFR BLD AUTO: 2.1 % (ref 0–3)
ERYTHROCYTE [DISTWIDTH] IN BLOOD BY AUTOMATED COUNT: 13.8 % (ref 11.3–14.5)
ERYTHROCYTE [DISTWIDTH] IN BLOOD BY AUTOMATED COUNT: 13.9 % (ref 11.3–14.5)
GFR SERPL CREATININE-BSD FRML MDRD: 69 ML/MIN/1.73
GFR SERPL CREATININE-BSD FRML MDRD: 70 ML/MIN/1.73
GLOBULIN UR ELPH-MCNC: 2.1 GM/DL
GLOBULIN UR ELPH-MCNC: 2.9 GM/DL
GLUCOSE BLD-MCNC: 90 MG/DL (ref 70–100)
GLUCOSE BLD-MCNC: 92 MG/DL (ref 70–100)
HBA1C MFR BLD: 5.9 %
HBA1C MFR BLD: 6 %
HBA1C MFR BLD: 6.5 % (ref 4.8–5.6)
HCT VFR BLD AUTO: 37.2 % (ref 34.5–44)
HCT VFR BLD AUTO: 38.5 % (ref 34.5–44)
HDLC SERPL-MCNC: 30 MG/DL (ref 40–60)
HDLC SERPL-MCNC: 42 MG/DL (ref 40–60)
HGB BLD-MCNC: 11.4 G/DL (ref 11.5–15.5)
HGB BLD-MCNC: 11.8 G/DL (ref 11.5–15.5)
IMM GRANULOCYTES # BLD: 0.02 10*3/MM3 (ref 0–0.03)
IMM GRANULOCYTES NFR BLD: 0.2 % (ref 0–0.6)
INR PPP: 2.1 (ref 2–3)
INR PPP: 2.3 (ref 2–3)
INR PPP: 2.4 (ref 2.5–3.5)
INR PPP: 3 (ref 2.5–3.5)
INR PPP: 4.1 (ref 2–3)
INR PPP: 4.6 (ref 2.5–3.5)
LYMPHOCYTES # BLD AUTO: 2.49 10*3/MM3 (ref 0.6–4.8)
LYMPHOCYTES NFR BLD AUTO: 29 % (ref 24–44)
MCH RBC QN AUTO: 29.1 PG (ref 27–31)
MCH RBC QN AUTO: 29.6 PG (ref 27–31)
MCHC RBC AUTO-ENTMCNC: 30.6 G/DL (ref 32–36)
MCHC RBC AUTO-ENTMCNC: 30.6 G/DL (ref 32–36)
MCV RBC AUTO: 94.9 FL (ref 80–99)
MCV RBC AUTO: 96.7 FL (ref 80–99)
MONOCYTES # BLD AUTO: 0.59 10*3/MM3 (ref 0–1)
MONOCYTES NFR BLD AUTO: 6.9 % (ref 0–12)
NEUTROPHILS # BLD AUTO: 5.25 10*3/MM3 (ref 1.5–8.3)
NEUTROPHILS NFR BLD AUTO: 61.2 % (ref 41–71)
PLATELET # BLD AUTO: 316 10*3/MM3 (ref 150–450)
PLATELET # BLD AUTO: 409 10*3/MM3 (ref 150–450)
PMV BLD AUTO: 9.6 FL (ref 6–12)
PMV BLD AUTO: 9.6 FL (ref 6–12)
POTASSIUM BLD-SCNC: 4.2 MMOL/L (ref 3.5–5.5)
POTASSIUM BLD-SCNC: 4.5 MMOL/L (ref 3.5–5.5)
PROT SERPL-MCNC: 6.3 G/DL (ref 5.7–8.2)
PROT SERPL-MCNC: 7.2 G/DL (ref 5.7–8.2)
RBC # BLD AUTO: 3.92 10*6/MM3 (ref 3.89–5.14)
RBC # BLD AUTO: 3.98 10*6/MM3 (ref 3.89–5.14)
SODIUM BLD-SCNC: 140 MMOL/L (ref 132–146)
SODIUM BLD-SCNC: 141 MMOL/L (ref 132–146)
TRIGL SERPL-MCNC: 112 MG/DL (ref 0–150)
TRIGL SERPL-MCNC: 144 MG/DL (ref 0–150)
WBC NRBC COR # BLD: 11.47 10*3/MM3 (ref 3.5–10.8)
WBC NRBC COR # BLD: 8.58 10*3/MM3 (ref 3.5–10.8)

## 2018-01-01 PROCEDURE — 90662 IIV NO PRSV INCREASED AG IM: CPT | Performed by: INTERNAL MEDICINE

## 2018-01-01 PROCEDURE — 80053 COMPREHEN METABOLIC PANEL: CPT | Performed by: INTERNAL MEDICINE

## 2018-01-01 PROCEDURE — 80061 LIPID PANEL: CPT | Performed by: INTERNAL MEDICINE

## 2018-01-01 PROCEDURE — 99213 OFFICE O/P EST LOW 20 MIN: CPT | Performed by: INTERNAL MEDICINE

## 2018-01-01 PROCEDURE — 85610 PROTHROMBIN TIME: CPT | Performed by: INTERNAL MEDICINE

## 2018-01-01 PROCEDURE — 83036 HEMOGLOBIN GLYCOSYLATED A1C: CPT | Performed by: INTERNAL MEDICINE

## 2018-01-01 PROCEDURE — 85025 COMPLETE CBC W/AUTO DIFF WBC: CPT | Performed by: INTERNAL MEDICINE

## 2018-01-01 PROCEDURE — 85027 COMPLETE CBC AUTOMATED: CPT | Performed by: INTERNAL MEDICINE

## 2018-01-01 PROCEDURE — G0008 ADMIN INFLUENZA VIRUS VAC: HCPCS | Performed by: INTERNAL MEDICINE

## 2018-01-01 PROCEDURE — 99214 OFFICE O/P EST MOD 30 MIN: CPT | Performed by: INTERNAL MEDICINE

## 2018-01-01 RX ORDER — ATORVASTATIN CALCIUM 40 MG/1
40 TABLET, FILM COATED ORAL DAILY
Qty: 90 TABLET | Refills: 1 | Status: SHIPPED | OUTPATIENT
Start: 2018-01-01 | End: 2018-01-01 | Stop reason: SDUPTHER

## 2018-01-01 RX ORDER — ATORVASTATIN CALCIUM 40 MG/1
40 TABLET, FILM COATED ORAL DAILY
Qty: 90 TABLET | Refills: 1 | Status: SHIPPED | OUTPATIENT
Start: 2018-01-01 | End: 2019-01-01 | Stop reason: SDUPTHER

## 2018-01-01 RX ORDER — DIGOXIN 250 MCG
TABLET ORAL
Qty: 90 TABLET | Refills: 1 | Status: SHIPPED | OUTPATIENT
Start: 2018-01-01 | End: 2018-01-01 | Stop reason: SDUPTHER

## 2018-01-01 RX ORDER — WARFARIN SODIUM 1 MG/1
TABLET ORAL
Qty: 108 TABLET | Refills: 5 | Status: ON HOLD | OUTPATIENT
Start: 2018-01-01 | End: 2019-01-01

## 2018-01-01 RX ORDER — ATORVASTATIN CALCIUM 40 MG/1
40 TABLET, FILM COATED ORAL DAILY
Qty: 90 TABLET | Refills: 1 | OUTPATIENT
Start: 2018-01-01

## 2018-01-01 RX ORDER — DIGOXIN 250 MCG
TABLET ORAL
Qty: 90 TABLET | Refills: 1 | Status: SHIPPED | OUTPATIENT
Start: 2018-01-01 | End: 2019-01-01 | Stop reason: SDUPTHER

## 2018-01-01 RX ORDER — LOSARTAN POTASSIUM AND HYDROCHLOROTHIAZIDE 25; 100 MG/1; MG/1
1 TABLET ORAL DAILY
Qty: 90 TABLET | Refills: 1 | Status: SHIPPED | OUTPATIENT
Start: 2018-01-01 | End: 2019-01-01 | Stop reason: SDUPTHER

## 2018-01-01 RX ORDER — LOSARTAN POTASSIUM AND HYDROCHLOROTHIAZIDE 25; 100 MG/1; MG/1
1 TABLET ORAL DAILY
Qty: 90 TABLET | Refills: 1 | Status: SHIPPED | OUTPATIENT
Start: 2018-01-01 | End: 2018-01-01 | Stop reason: SDUPTHER

## 2018-01-01 RX ORDER — METOPROLOL SUCCINATE 100 MG/1
100 TABLET, EXTENDED RELEASE ORAL DAILY
Qty: 90 TABLET | Refills: 3 | Status: SHIPPED | OUTPATIENT
Start: 2018-01-01 | End: 2019-01-01 | Stop reason: SDUPTHER

## 2018-01-01 RX ORDER — WARFARIN SODIUM 1 MG/1
TABLET ORAL
Qty: 108 TABLET | Refills: 5 | Status: SHIPPED | OUTPATIENT
Start: 2018-01-01 | End: 2018-01-01 | Stop reason: SDUPTHER

## 2018-01-03 ENCOUNTER — TELEPHONE (OUTPATIENT)
Dept: INTERNAL MEDICINE | Facility: CLINIC | Age: 83
End: 2018-01-03

## 2018-01-03 ENCOUNTER — CLINICAL SUPPORT (OUTPATIENT)
Dept: INTERNAL MEDICINE | Facility: CLINIC | Age: 83
End: 2018-01-03

## 2018-01-03 DIAGNOSIS — I48.91 ATRIAL FIBRILLATION, UNSPECIFIED TYPE (HCC): Primary | ICD-10-CM

## 2018-01-03 LAB — INR PPP: 2.1 (ref 2–3)

## 2018-01-03 PROCEDURE — 85610 PROTHROMBIN TIME: CPT | Performed by: INTERNAL MEDICINE

## 2018-01-03 NOTE — TELEPHONE ENCOUNTER
----- Message from Maryann Guillaume MD sent at 1/3/2018  9:18 AM EST -----  No change - we can recheck in 4 weeks

## 2018-01-31 ENCOUNTER — TELEPHONE (OUTPATIENT)
Dept: INTERNAL MEDICINE | Facility: CLINIC | Age: 83
End: 2018-01-31

## 2018-01-31 ENCOUNTER — CLINICAL SUPPORT (OUTPATIENT)
Dept: INTERNAL MEDICINE | Facility: CLINIC | Age: 83
End: 2018-01-31

## 2018-01-31 LAB — INR PPP: 2.3 (ref 2–3)

## 2018-01-31 PROCEDURE — 85610 PROTHROMBIN TIME: CPT | Performed by: INTERNAL MEDICINE

## 2018-01-31 NOTE — TELEPHONE ENCOUNTER
----- Message from Maryann Guillaume MD sent at 1/31/2018 11:48 AM EST -----      ----- Message -----     From: Ning Segura MA     Sent: 1/31/2018   9:03 AM       To: Maryann Guillaume MD    She is taking 3 mg daily and INR was 2.3 today.

## 2018-02-04 ENCOUNTER — HOSPITAL ENCOUNTER (INPATIENT)
Facility: HOSPITAL | Age: 83
LOS: 3 days | Discharge: NURSING, REHAB OR LONG-TERM CARE | End: 2018-02-10
Attending: EMERGENCY MEDICINE | Admitting: INTERNAL MEDICINE

## 2018-02-04 ENCOUNTER — APPOINTMENT (OUTPATIENT)
Dept: GENERAL RADIOLOGY | Facility: HOSPITAL | Age: 83
End: 2018-02-04

## 2018-02-04 ENCOUNTER — APPOINTMENT (OUTPATIENT)
Dept: CT IMAGING | Facility: HOSPITAL | Age: 83
End: 2018-02-04

## 2018-02-04 DIAGNOSIS — S80.12XA CONTUSION OF LEFT LOWER EXTREMITY, INITIAL ENCOUNTER: ICD-10-CM

## 2018-02-04 DIAGNOSIS — Z74.09 IMPAIRED FUNCTIONAL MOBILITY, BALANCE, GAIT, AND ENDURANCE: ICD-10-CM
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
## 2018-02-04 DIAGNOSIS — S22.42XA CLOSED FRACTURE OF MULTIPLE RIBS OF LEFT SIDE, INITIAL ENCOUNTER: Primary | ICD-10-CM

## 2018-02-04 DIAGNOSIS — T79.7XXA SUBCUTANEOUS EMPHYSEMA, INITIAL ENCOUNTER (HCC): ICD-10-CM

## 2018-02-04 PROBLEM — R01.1 HEART MURMUR: Chronic | Status: ACTIVE | Noted: 2018-02-04

## 2018-02-04 PROBLEM — N28.1 RENAL CYST: Status: ACTIVE | Noted: 2018-02-04

## 2018-02-04 LAB
ALBUMIN SERPL-MCNC: 4 G/DL (ref 3.2–4.8)
ALBUMIN/GLOB SERPL: 1.4 G/DL (ref 1.5–2.5)
ALP SERPL-CCNC: 65 U/L (ref 25–100)
ALT SERPL W P-5'-P-CCNC: 15 U/L (ref 7–40)
ANION GAP SERPL CALCULATED.3IONS-SCNC: 5 MMOL/L (ref 3–11)
AST SERPL-CCNC: 18 U/L (ref 0–33)
BASOPHILS # BLD AUTO: 0.04 10*3/MM3 (ref 0–0.2)
BASOPHILS NFR BLD AUTO: 0.3 % (ref 0–1)
BILIRUB SERPL-MCNC: 0.4 MG/DL (ref 0.3–1.2)
BUN BLD-MCNC: 24 MG/DL (ref 9–23)
BUN/CREAT SERPL: 30 (ref 7–25)
CALCIUM SPEC-SCNC: 9 MG/DL (ref 8.7–10.4)
CHLORIDE SERPL-SCNC: 107 MMOL/L (ref 99–109)
CO2 SERPL-SCNC: 31 MMOL/L (ref 20–31)
CREAT BLD-MCNC: 0.8 MG/DL (ref 0.6–1.3)
DEPRECATED RDW RBC AUTO: 54.1 FL (ref 37–54)
EOSINOPHIL # BLD AUTO: 0.16 10*3/MM3 (ref 0–0.3)
EOSINOPHIL NFR BLD AUTO: 1.2 % (ref 0–3)
ERYTHROCYTE [DISTWIDTH] IN BLOOD BY AUTOMATED COUNT: 15.2 % (ref 11.3–14.5)
GFR SERPL CREATININE-BSD FRML MDRD: 69 ML/MIN/1.73
GLOBULIN UR ELPH-MCNC: 2.9 GM/DL
GLUCOSE BLD-MCNC: 114 MG/DL (ref 70–100)
GLUCOSE BLDC GLUCOMTR-MCNC: 139 MG/DL (ref 70–130)
GLUCOSE BLDC GLUCOMTR-MCNC: 145 MG/DL (ref 70–130)
HCT VFR BLD AUTO: 37.2 % (ref 34.5–44)
HGB BLD-MCNC: 11.3 G/DL (ref 11.5–15.5)
HOLD SPECIMEN: NORMAL
HOLD SPECIMEN: NORMAL
IMM GRANULOCYTES # BLD: 0.04 10*3/MM3 (ref 0–0.03)
IMM GRANULOCYTES NFR BLD: 0.3 % (ref 0–0.6)
INR PPP: 2.49
LYMPHOCYTES # BLD AUTO: 1.69 10*3/MM3 (ref 0.6–4.8)
LYMPHOCYTES NFR BLD AUTO: 12.2 % (ref 24–44)
MCH RBC QN AUTO: 29.5 PG (ref 27–31)
MCHC RBC AUTO-ENTMCNC: 30.4 G/DL (ref 32–36)
MCV RBC AUTO: 97.1 FL (ref 80–99)
MONOCYTES # BLD AUTO: 0.81 10*3/MM3 (ref 0–1)
MONOCYTES NFR BLD AUTO: 5.8 % (ref 0–12)
NEUTROPHILS # BLD AUTO: 11.16 10*3/MM3 (ref 1.5–8.3)
NEUTROPHILS NFR BLD AUTO: 80.2 % (ref 41–71)
PLATELET # BLD AUTO: 377 10*3/MM3 (ref 150–450)
PMV BLD AUTO: 9.6 FL (ref 6–12)
POTASSIUM BLD-SCNC: 4.2 MMOL/L (ref 3.5–5.5)
PROT SERPL-MCNC: 6.9 G/DL (ref 5.7–8.2)
PROTHROMBIN TIME: 27.9 SECONDS (ref 9.6–11.5)
RBC # BLD AUTO: 3.83 10*6/MM3 (ref 3.89–5.14)
SODIUM BLD-SCNC: 143 MMOL/L (ref 132–146)
TSH SERPL DL<=0.05 MIU/L-ACNC: 8.22 MIU/ML (ref 0.35–5.35)
WBC NRBC COR # BLD: 13.9 10*3/MM3 (ref 3.5–10.8)
WHOLE BLOOD HOLD SPECIMEN: NORMAL
WHOLE BLOOD HOLD SPECIMEN: NORMAL

## 2018-02-04 PROCEDURE — 85610 PROTHROMBIN TIME: CPT | Performed by: PHYSICIAN ASSISTANT

## 2018-02-04 PROCEDURE — 82962 GLUCOSE BLOOD TEST: CPT

## 2018-02-04 PROCEDURE — 72170 X-RAY EXAM OF PELVIS: CPT

## 2018-02-04 PROCEDURE — G0378 HOSPITAL OBSERVATION PER HR: HCPCS

## 2018-02-04 PROCEDURE — 84443 ASSAY THYROID STIM HORMONE: CPT | Performed by: NURSE PRACTITIONER

## 2018-02-04 PROCEDURE — 80053 COMPREHEN METABOLIC PANEL: CPT | Performed by: PHYSICIAN ASSISTANT

## 2018-02-04 PROCEDURE — 99284 EMERGENCY DEPT VISIT MOD MDM: CPT

## 2018-02-04 PROCEDURE — 73590 X-RAY EXAM OF LOWER LEG: CPT

## 2018-02-04 PROCEDURE — 85025 COMPLETE CBC W/AUTO DIFF WBC: CPT | Performed by: PHYSICIAN ASSISTANT

## 2018-02-04 PROCEDURE — 71250 CT THORAX DX C-: CPT

## 2018-02-04 PROCEDURE — 99222 1ST HOSP IP/OBS MODERATE 55: CPT | Performed by: INTERNAL MEDICINE

## 2018-02-04 PROCEDURE — 63710000001 INSULIN LISPRO (HUMAN) PER 5 UNITS: Performed by: NURSE PRACTITIONER

## 2018-02-04 RX ORDER — MULTIPLE VITAMINS W/ MINERALS TAB 9MG-400MCG
1 TAB ORAL DAILY
Status: DISCONTINUED | OUTPATIENT
Start: 2018-02-04 | End: 2018-02-04

## 2018-02-04 RX ORDER — ATORVASTATIN CALCIUM 40 MG/1
40 TABLET, FILM COATED ORAL DAILY
Status: DISCONTINUED | OUTPATIENT
Start: 2018-02-05 | End: 2018-02-10 | Stop reason: HOSPADM

## 2018-02-04 RX ORDER — DIGOXIN 250 MCG
250 TABLET ORAL DAILY
Status: DISCONTINUED | OUTPATIENT
Start: 2018-02-04 | End: 2018-02-04

## 2018-02-04 RX ORDER — NICOTINE POLACRILEX 4 MG
15 LOZENGE BUCCAL
Status: DISCONTINUED | OUTPATIENT
Start: 2018-02-04 | End: 2018-02-10 | Stop reason: HOSPADM

## 2018-02-04 RX ORDER — PIOGLITAZONEHYDROCHLORIDE 15 MG/1
15 TABLET ORAL DAILY
Status: DISCONTINUED | OUTPATIENT
Start: 2018-02-05 | End: 2018-02-04

## 2018-02-04 RX ORDER — METOPROLOL SUCCINATE 25 MG/1
25 TABLET, EXTENDED RELEASE ORAL DAILY
Status: DISCONTINUED | OUTPATIENT
Start: 2018-02-04 | End: 2018-02-04

## 2018-02-04 RX ORDER — HYDROCODONE BITARTRATE AND ACETAMINOPHEN 5; 325 MG/1; MG/1
1 TABLET ORAL EVERY 4 HOURS PRN
Status: DISCONTINUED | OUTPATIENT
Start: 2018-02-04 | End: 2018-02-04

## 2018-02-04 RX ORDER — MULTIPLE VITAMINS W/ MINERALS TAB 9MG-400MCG
1 TAB ORAL DAILY
Status: DISCONTINUED | OUTPATIENT
Start: 2018-02-05 | End: 2018-02-10 | Stop reason: HOSPADM

## 2018-02-04 RX ORDER — LOSARTAN POTASSIUM AND HYDROCHLOROTHIAZIDE 12.5; 5 MG/1; MG/1
2 TABLET ORAL DAILY
Status: DISCONTINUED | OUTPATIENT
Start: 2018-02-05 | End: 2018-02-10 | Stop reason: HOSPADM

## 2018-02-04 RX ORDER — TRAZODONE HYDROCHLORIDE 50 MG/1
50 TABLET ORAL NIGHTLY
Status: DISCONTINUED | OUTPATIENT
Start: 2018-02-04 | End: 2018-02-10 | Stop reason: HOSPADM

## 2018-02-04 RX ORDER — WARFARIN SODIUM 4 MG/1
4 TABLET ORAL
Status: DISCONTINUED | OUTPATIENT
Start: 2018-02-05 | End: 2018-02-04

## 2018-02-04 RX ORDER — DIGOXIN 250 MCG
250 TABLET ORAL DAILY
Status: DISCONTINUED | OUTPATIENT
Start: 2018-02-05 | End: 2018-02-10 | Stop reason: HOSPADM

## 2018-02-04 RX ORDER — LACTULOSE 10 G/15ML
10 SOLUTION ORAL DAILY
Status: DISCONTINUED | OUTPATIENT
Start: 2018-02-04 | End: 2018-02-06

## 2018-02-04 RX ORDER — LOSARTAN POTASSIUM AND HYDROCHLOROTHIAZIDE 12.5; 5 MG/1; MG/1
2 TABLET ORAL DAILY
Status: DISCONTINUED | OUTPATIENT
Start: 2018-02-04 | End: 2018-02-04

## 2018-02-04 RX ORDER — ACETAMINOPHEN AND CODEINE PHOSPHATE 300; 30 MG/1; MG/1
1 TABLET ORAL ONCE
Status: COMPLETED | OUTPATIENT
Start: 2018-02-04 | End: 2018-02-04

## 2018-02-04 RX ORDER — HYDROCODONE BITARTRATE AND ACETAMINOPHEN 5; 325 MG/1; MG/1
1 TABLET ORAL EVERY 4 HOURS
Status: DISCONTINUED | OUTPATIENT
Start: 2018-02-04 | End: 2018-02-10 | Stop reason: HOSPADM

## 2018-02-04 RX ORDER — ATORVASTATIN CALCIUM 40 MG/1
40 TABLET, FILM COATED ORAL DAILY
Status: DISCONTINUED | OUTPATIENT
Start: 2018-02-04 | End: 2018-02-04

## 2018-02-04 RX ORDER — WARFARIN SODIUM 3 MG/1
3 TABLET ORAL WEEKLY
COMMUNITY
End: 2018-02-10 | Stop reason: HOSPADM

## 2018-02-04 RX ORDER — WARFARIN SODIUM 4 MG/1
4 TABLET ORAL SEE ADMIN INSTRUCTIONS
COMMUNITY
End: 2018-02-10 | Stop reason: HOSPADM

## 2018-02-04 RX ORDER — METOPROLOL SUCCINATE 25 MG/1
25 TABLET, EXTENDED RELEASE ORAL DAILY
Status: DISCONTINUED | OUTPATIENT
Start: 2018-02-05 | End: 2018-02-07

## 2018-02-04 RX ORDER — ACETAMINOPHEN 325 MG/1
650 TABLET ORAL EVERY 4 HOURS PRN
Status: DISCONTINUED | OUTPATIENT
Start: 2018-02-04 | End: 2018-02-10 | Stop reason: HOSPADM

## 2018-02-04 RX ORDER — WARFARIN SODIUM 3 MG/1
3 TABLET ORAL
Status: DISCONTINUED | OUTPATIENT
Start: 2018-02-04 | End: 2018-02-04

## 2018-02-04 RX ORDER — DEXTROSE MONOHYDRATE 25 G/50ML
25 INJECTION, SOLUTION INTRAVENOUS
Status: DISCONTINUED | OUTPATIENT
Start: 2018-02-04 | End: 2018-02-10 | Stop reason: HOSPADM

## 2018-02-04 RX ORDER — PIOGLITAZONEHYDROCHLORIDE 15 MG/1
15 TABLET ORAL DAILY
Status: DISCONTINUED | OUTPATIENT
Start: 2018-02-04 | End: 2018-02-04

## 2018-02-04 RX ADMIN — HYDROCODONE BITARTRATE AND ACETAMINOPHEN 1 TABLET: 5; 325 TABLET ORAL at 13:46

## 2018-02-04 RX ADMIN — ACETAMINOPHEN AND CODEINE PHOSPHATE 1 TABLET: 300; 30 TABLET ORAL at 11:29

## 2018-02-04 RX ADMIN — HYDROCODONE BITARTATE AND ACETAMINOPHEN 1 TABLET: 5; 325 TABLET ORAL at 17:23

## 2018-02-04 RX ADMIN — HYDROCODONE BITARTATE AND ACETAMINOPHEN 1 TABLET: 5; 325 TABLET ORAL at 21:24

## 2018-02-04 RX ADMIN — TRAZODONE HYDROCHLORIDE 50 MG: 50 TABLET ORAL at 21:24

## 2018-02-04 NOTE — PROGRESS NOTES
"Pharmacy Consult  -  Warfarin    Kristin Magdaleno is a  83 y.o. female   Height - 177.8 cm (70\")  Weight - 49.9 kg (110 lb)    Consulting Provider: - Dr. Jean  Indication: - Embolic Stroke  Goal INR: - 1.7-2.5  Home Regimen:   - 3 mg on Sundays   - 4 mg on Mon, Tues, Wed, Thurs, Fri, Sat    Bridge Therapy: No         Drug-Drug Interactions with current regimen:   Atorvastatin, may increase INR    Warfarin Dosing During Admission:    Date  2/4           INR  2.49           Dose  (3 mg)                Education Provided:      Labs:    Results from last 7 days   Lab Units 02/04/18  1138 01/31/18   INR  2.49 2.30   HEMOGLOBIN g/dL 11.3* --    HEMATOCRIT % 37.2 --    PLATELETS 10*3/mm3 377 --      Results from last 7 days   Lab Units 02/04/18  1138   SODIUM mmol/L 143   POTASSIUM mmol/L 4.2   CHLORIDE mmol/L 107   CO2 mmol/L 31.0   BUN mg/dL 24*   CREATININE mg/dL 0.80   CALCIUM mg/dL 9.0   BILIRUBIN mg/dL 0.4   ALK PHOS U/L 65   ALT (SGPT) U/L 15   AST (SGOT) U/L 18   GLUCOSE mg/dL 114*       Current dietary intake:   Diet Order   Procedures   • Diet Regular       Assessment/Plan:   -INR 2.49 today, within therapeutic goal range of 1.7-2.5.   -Will resume home dose with warfarin 3 mg today   -Monitor for drug-drug interactions and signs/symptoms of bleeding  -Pharmacy will continue to follow and adjust based on daily INR    Emanuel Montes, PharmD  Pharmacy Resident  2/4/2018  2:42 PM        "

## 2018-02-04 NOTE — PLAN OF CARE
Problem: Patient Care Overview (Adult)  Goal: Plan of Care Review  Outcome: Ongoing (interventions implemented as appropriate)   02/04/18 0619   Coping/Psychosocial Response Interventions   Plan Of Care Reviewed With patient;family   Patient Care Overview   Progress no change   Outcome Evaluation   Outcome Summary/Follow up Plan Received from ED in stable condition. Hypertensive but otherwise VSS. NSR in 80s-90s. Family at bedside. Stage 3 coccyx? WOC consulted for advice/opinion. Hospitalist managing care, pharmacy dosing coumadin     Goal: Adult Individualization and Mutuality  Outcome: Ongoing (interventions implemented as appropriate)    Goal: Discharge Needs Assessment  Outcome: Ongoing (interventions implemented as appropriate)      Problem: Fall Risk (Adult)  Goal: Identify Related Risk Factors and Signs and Symptoms  Outcome: Ongoing (interventions implemented as appropriate)    Goal: Absence of Falls  Outcome: Ongoing (interventions implemented as appropriate)      Problem: Pressure Ulcer Risk (Grey Scale) (Adult,Obstetrics,Pediatric)  Goal: Identify Related Risk Factors and Signs and Symptoms  Outcome: Ongoing (interventions implemented as appropriate)

## 2018-02-04 NOTE — ED PROVIDER NOTES
Subjective   HPI Comments: Pt is a 82 yo female presenting to ED with left chest wall pain and left leg pain after a fall. She was getting up from the toilet when she lost her balance and fell hitting the bathtub with her left chest. No reports of LOC, neck or back pain, headache, or N/V. Pt denies CP, SOB or dizziness prior or post fall. 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 She lives with  at home and uses a wheelchair. She is able to transfer using her left leg. Since the fall patient unable to get up due to pain in her left chest. Pt with significant hx for CVA (chronic right hemiplegia and chronic coumadin use), CAD (CABG), DM (non insulin dependent), HTN and HLD.     Patient is a 83 y.o. female presenting with fall.   History provided by:  Patient and relative  Fall   Mechanism of injury: fall    Injury location:  Torso  Torso injury location:  L chest  Incident location:  Home  Time since incident: PTA   Fall:     Fall occurred: Getting up from toilet     Impact surface: Tub.  Prior to arrival data:     Loss of consciousness: no    Associated symptoms: no abdominal pain, no back pain, no chest pain, no difficulty breathing, no headaches, no loss of consciousness, no nausea, no neck pain and no vomiting    Risk factors: anticoagulation therapy (Coumadin )        Review of Systems   Constitutional: Negative for fever.   HENT: Negative for facial swelling, nosebleeds and trouble swallowing.    Eyes: Negative for visual disturbance.   Respiratory: Negative for cough and shortness of breath.    Cardiovascular: Negative for chest pain and palpitations.   Gastrointestinal: Negative for abdominal pain, blood in stool, diarrhea, nausea and vomiting.   Musculoskeletal: Positive for arthralgias (Left tib/fib). Negative for back pain and neck pain.        Left chest wall    Skin: Negative for wound.   Neurological: Negative for loss of consciousness and headaches.    All other systems reviewed and are negative.      Past Medical History:   Diagnosis Date   • Atrial fibrillation    • CAD (coronary artery disease)     s/p cabg   • Chronic anticoagulation    • Diabetes mellitus     type II    • H/O hemiparesis     Rt sided    • Heart murmur     RHF as child.  no recent echos (pt declines)   • Hyperlipidemia    • Hypertension    • Hypothyroid    • Rheumatic heart disease     as a child, heart murmur, no recent echos (pt. declines)   • Stroke     20+ years ago in OR during CABG        Allergies   Allergen Reactions   • Cephalosporins      adulthood   • Morphine And Related      unknown   • Penicillins      Penicillin G Potassium       Past Surgical History:   Procedure Laterality Date   • APPENDECTOMY     • BREAST SURGERY      no breast tissue, never had tara, mel. breast implants   • CORONARY ARTERY BYPASS GRAFT  1994    CVA  and a fib post-op - on coumadin since   • TOTAL HIP ARTHROPLASTY REVISION Right 1998       Family History   Problem Relation Age of Onset   • Stomach cancer Mother    • Lung cancer Father    • Diabetes Brother    • Heart disease Brother    • No Known Problems Daughter    • No Known Problems Son    • No Known Problems Daughter        Social History     Social History   • Marital status:      Spouse name: N/A   • Number of children: N/A   • Years of education: N/A     Social History Main Topics   • Smoking status: Former Smoker     Packs/day: 1.00     Years: 31.00     Types: Cigarettes     Start date: 6/26/1962     Quit date: 1/11/1993   • Smokeless tobacco: Never Used      Comment: quit after stroke.   • Alcohol use No   • Drug use: No   • Sexual activity: Defer     Other Topics Concern   • None     Social History Narrative    Hx of CVA in 1994 during CABG surgery with Rt hemiparesis and afib on coumadin since.  Elderly, frail and up in W/c.  Lives with             Objective   Physical Exam   Constitutional: She is oriented to person, place, and  time. She appears well-developed and well-nourished.   HENT:   Head: Atraumatic.   Mouth/Throat: Oropharynx is clear and moist.   Eyes: Conjunctivae are normal.   Neck: Normal range of motion. Neck supple.   Cardiovascular: Normal rate and intact distal pulses.    Pulmonary/Chest: Effort normal. No respiratory distress. She has no wheezes. She exhibits tenderness (Mid axillary and posterior left inferior chest wall TTP). She exhibits no crepitus, no swelling and no retraction.   Abdominal: Soft. There is no tenderness.   Musculoskeletal:        Left hip: She exhibits normal range of motion, no tenderness and no deformity.        Left lower leg: She exhibits tenderness (with contusion mid tib fib) and swelling.   Neurological: She is alert and oriented to person, place, and time. A sensory deficit (Right UE and LE chronically ) is present.   Chronic right hemiplegia.    Skin: Skin is warm.   Psychiatric: She has a normal mood and affect.   Nursing note and vitals reviewed.      Procedures         ED Course  ED Course      Re-examined patient several times in ED. Pt feeling better after Tylenol #3 and resting comfortably. Discussed results with patient and family. They are agreeable with admission for observation due to punctured lung from rib fractures. Concern from family regarding patient going home due to her chronic right hemiplegia and difficulty moving/getting around with her pain from the left rib fractures.     Discussed patient with Dr. Infante.     Discussed admission with Dr. Jean.     Reviewed old records.     Recent Results (from the past 24 hour(s))   Protime-INR    Collection Time: 02/04/18 11:38 AM   Result Value Ref Range    Protime 27.9 (H) 9.6 - 11.5 Seconds    INR 2.49    Comprehensive Metabolic Panel    Collection Time: 02/04/18 11:38 AM   Result Value Ref Range    Glucose 114 (H) 70 - 100 mg/dL    BUN 24 (H) 9 - 23 mg/dL    Creatinine 0.80 0.60 - 1.30 mg/dL    Sodium 143 132 - 146 mmol/L     Potassium 4.2 3.5 - 5.5 mmol/L    Chloride 107 99 - 109 mmol/L    CO2 31.0 20.0 - 31.0 mmol/L    Calcium 9.0 8.7 - 10.4 mg/dL    Total Protein 6.9 5.7 - 8.2 g/dL    Albumin 4.00 3.20 - 4.80 g/dL    ALT (SGPT) 15 7 - 40 U/L    AST (SGOT) 18 0 - 33 U/L    Alkaline Phosphatase 65 25 - 100 U/L    Total Bilirubin 0.4 0.3 - 1.2 mg/dL    eGFR Non African Amer 69 >60 mL/min/1.73    Globulin 2.9 gm/dL    A/G Ratio 1.4 (L) 1.5 - 2.5 g/dL    BUN/Creatinine Ratio 30.0 (H) 7.0 - 25.0    Anion Gap 5.0 3.0 - 11.0 mmol/L   Light Blue Top    Collection Time: 02/04/18 11:38 AM   Result Value Ref Range    Extra Tube hold for add-on    Green Top (Gel)    Collection Time: 02/04/18 11:38 AM   Result Value Ref Range    Extra Tube Hold for add-ons.    Lavender Top    Collection Time: 02/04/18 11:38 AM   Result Value Ref Range    Extra Tube hold for add-on    Gold Top - SST    Collection Time: 02/04/18 11:38 AM   Result Value Ref Range    Extra Tube Hold for add-ons.    CBC Auto Differential    Collection Time: 02/04/18 11:38 AM   Result Value Ref Range    WBC 13.90 (H) 3.50 - 10.80 10*3/mm3    RBC 3.83 (L) 3.89 - 5.14 10*6/mm3    Hemoglobin 11.3 (L) 11.5 - 15.5 g/dL    Hematocrit 37.2 34.5 - 44.0 %    MCV 97.1 80.0 - 99.0 fL    MCH 29.5 27.0 - 31.0 pg    MCHC 30.4 (L) 32.0 - 36.0 g/dL    RDW 15.2 (H) 11.3 - 14.5 %    RDW-SD 54.1 (H) 37.0 - 54.0 fl    MPV 9.6 6.0 - 12.0 fL    Platelets 377 150 - 450 10*3/mm3    Neutrophil % 80.2 (H) 41.0 - 71.0 %    Lymphocyte % 12.2 (L) 24.0 - 44.0 %    Monocyte % 5.8 0.0 - 12.0 %    Eosinophil % 1.2 0.0 - 3.0 %    Basophil % 0.3 0.0 - 1.0 %    Immature Grans % 0.3 0.0 - 0.6 %    Neutrophils, Absolute 11.16 (H) 1.50 - 8.30 10*3/mm3    Lymphocytes, Absolute 1.69 0.60 - 4.80 10*3/mm3    Monocytes, Absolute 0.81 0.00 - 1.00 10*3/mm3    Eosinophils, Absolute 0.16 0.00 - 0.30 10*3/mm3    Basophils, Absolute 0.04 0.00 - 0.20 10*3/mm3    Immature Grans, Absolute 0.04 (H) 0.00 - 0.03 10*3/mm3     Note: In addition  "to lab results from this visit, the labs listed above may include labs taken at another facility or during a different encounter within the last 24 hours. Please correlate lab times with ED admission and discharge times for further clarification of the services performed during this visit.    CT Chest Without Contrast   Preliminary Result   1. Mildly displaced posterior left ninth and tenth rib fractures. Small   amount of adjacent pleural thickening and minimal atelectasis. Trace   intrapleural air and small amount of air along the chest wall. No   evidence of significant pneumothorax or effusion.   2. No other evidence of acute trauma.   3. Probable small hyperdense left renal cyst. If patient has hematuria,   follow-up with renal ultrasound could be considered for further   characterization.       DICTATED:     02/04/2018   EDITED    :     02/04/2018               XR Pelvis 1 or 2 View    (Results Pending)   XR Tibia Fibula 2 View Left    (Results Pending)     Vitals:    02/04/18 1200 02/04/18 1300 02/04/18 1401 02/04/18 1405   BP: (!) 191/80 176/77  180/78   BP Location:    Right arm   Patient Position:    Lying   Pulse: 90 92  107   Resp:    20   Temp:    98.3 °F (36.8 °C)   TempSrc:    Oral   SpO2: 94% 93%  93%   Weight:   55.1 kg (121 lb 8 oz)    Height:   177.8 cm (70\")      Medications   traZODone (DESYREL) tablet 50 mg (not administered)   HYDROcodone-acetaminophen (NORCO) 5-325 MG per tablet 1 tablet (1 tablet Oral Given 2/4/18 1346)   acetaminophen (TYLENOL) tablet 650 mg (not administered)   digoxin (LANOXIN) tablet 250 mcg (not administered)   losartan-hydrochlorothiazide (HYZAAR) 50-12.5 MG per tablet 2 tablet (not administered)   atorvastatin (LIPITOR) tablet 40 mg (not administered)   metoprolol succinate XL (TOPROL-XL) 24 hr tablet 25 mg (not administered)   multivitamin with minerals 1 tablet (not administered)   dextrose (GLUTOSE) oral gel 15 g (not administered)   dextrose (D50W) solution 25 g " (not administered)   glucagon (human recombinant) (GLUCAGEN DIAGNOSTIC) injection 1 mg (not administered)   insulin lispro (humaLOG) injection 0-7 Units (not administered)   acetaminophen-codeine (TYLENOL #3) 300-30 MG per tablet 1 tablet (1 tablet Oral Given 2/4/18 1840)     ECG/EMG Results (last 24 hours)     ** No results found for the last 24 hours. **                    MDM    Final diagnoses:   Closed fracture of multiple ribs of left side, initial encounter   Contusion of left lower extremity, initial encounter   Fall, initial encounter   Subcutaneous emphysema, initial encounter            FACUNDO Martini  02/04/18 8720

## 2018-02-04 NOTE — H&P
[facility] Medicine Services  HISTORY AND PHYSICAL    Patient Name: Kristin Magdaleno  : 1935  MRN: [id number]  Primary Care Physician: Maryann Guillaume MD    Subjective   Subjective     Chief Complaint:  Lt rib and LLE pain s/p fall     HPI:  Kristin Magdaleno is a 83 y.o. female past medical history significant for hypertension, hyperlipidemia, type 2 diabetes, CAD, hypothyroidism.  She underwent CABG in  and experienced a CVA and went into A. fib intra-op.  Postop had right total hemiparesis.  Has been wheelchair bound and on Coumadin since that time.  He lives at home with her .  She is a chronic decubitus ulcer to her coccyx.  Bilateral lower extremity edema right greater than left due to immobility.  She is followed by her PCP for all medical conditions and a dermatologist for general skin issues.  No current cardiologist.  She is awake and alert.  She presents to [facility] ER after standing from the toilet this morning approximately 9 AM losing her balance and falling into her tub.  He landed on her left side.  No loss of consciousness, dizziness or shortness of air.  Pain and decreased mobility she was seen in ER.  X-rays revealed ninth and 10th rib fractures on the left as well as all amount of adjacent pleural thickening and minimal atelectasis.  Trace intrapleural air and a small amount of air along the chest wall with no evidence of significant pneumothorax or effusion noted.  Incidental probable small left renal cyst and can be followed up outpatient further if needed.  She also was noted to have a left lower extremity contusion.  He admitted to hospital medicine service for observation of her fractures as well as subcutaneous emphysema.  Also on chronic Coumadin post fall for further bleeding monitoring.  Plan to hold Coumadin for now and monitor for bleeding.  We'll need to resume and/or initiate heparin therapy as soon as medically stable.  At time of admit to  unit she is awake and alert.  Family at bedside.  Only complaint is left rib and left lower extremity soreness.    Review of Systems   Constitutional: Positive for activity change and fatigue. Negative for chills and fever.   HENT: Negative.    Eyes: Negative.    Respiratory: Negative for cough, choking, chest tightness, shortness of breath, wheezing and stridor.    Cardiovascular: Positive for chest pain and leg swelling. Negative for palpitations.        Chest wall and rib pain s/p fall.  Chronic BLE edema R>L with rt hemiparesis.  Up per W/C   Gastrointestinal: Negative for abdominal distention, abdominal pain, constipation, diarrhea, nausea and vomiting.   Endocrine: Negative.    Genitourinary: Negative.    Musculoskeletal: Positive for arthralgias and gait problem.        W/C bound s/p CVA.  Pivots on LLE normally   Skin: Positive for pallor and wound.        Lt lateral chest wall/9th rib area with contusion.  LLE with later leg contusion.     Allergic/Immunologic: Negative.    Neurological: Negative for dizziness, seizures, syncope and light-headedness.   Hematological: Bruises/bleeds easily.        On coumadin   Psychiatric/Behavioral: Negative for agitation and confusion. The patient is not nervous/anxious.            Otherwise 10-system ROS reviewed and is negative except as mentioned in the HPI.    Personal History     Past Medical History:   Diagnosis Date   • Atrial fibrillation    • CAD (coronary artery disease)     s/p cabg   • Chronic anticoagulation    • Diabetes mellitus     type II    • H/O hemiparesis     Rt sided    • Heart murmur     RHF as child.  no recent echos (pt declines)   • Hyperlipidemia    • Hypertension    • Hypothyroid    • Rheumatic heart disease     as a child, heart murmur, no recent echos (pt. declines)   • Stroke     20+ years ago in OR during CABG        Past Surgical History:   Procedure Laterality Date   • APPENDECTOMY     • BREAST SURGERY      no breast tissue, never had  tara, mel. breast implants   • CORONARY ARTERY BYPASS GRAFT  1994    CVA  and a fib post-op - on coumadin since   • TOTAL HIP ARTHROPLASTY REVISION Right 1998       Family History: family history includes Diabetes in her brother; Heart disease in her brother; Lung cancer in her father; No Known Problems in her daughter, daughter, and son; Stomach cancer in her mother.     Social History:  reports that she quit smoking about 25 years ago. Her smoking use included Cigarettes. She started smoking about 55 years ago. She has a 31.00 pack-year smoking history. She has never used smokeless tobacco. She reports that she does not drink alcohol or use illicit drugs.  Social History     Social History Narrative    Hx of CVA in 1994 during CABG surgery with Rt hemiparesis and afib on coumadin since.  Elderly, frail and up in W/c.  Lives with         Medications:  Prescriptions Prior to Admission   Medication Sig Dispense Refill Last Dose   • atorvastatin (LIPITOR) 40 MG tablet Take 1 tablet by mouth Daily. 90 tablet 1    • digoxin (LANOXIN) 250 MCG tablet TAKE 1 TABLET BY MOUTH DAILY. 90 tablet 1    • fluocinonide-emollient (LIDEX-E) 0.05 % cream Apply  topically 2 (Two) Times a Day. To shins as needed for redness 15 g 1    • losartan-hydrochlorothiazide (HYZAAR) 100-25 MG per tablet Take 1 tablet by mouth Daily. 90 tablet 1    • metFORMIN (GLUCOPHAGE) 1000 MG tablet Take 1 tablet by mouth 2 (Two) Times a Day With Meals. 180 tablet 1    • metoprolol succinate XL (TOPROL XL) 25 MG 24 hr tablet Take 1 tablet by mouth Daily. 30 tablet 5    • Multiple Vitamins-Minerals (PRESERVISION AREDS 2 PO) Take 2 capsules by mouth Daily.      • pioglitazone (ACTOS) 15 MG tablet Take 1 tablet by mouth Daily. 90 tablet 1    • traZODone (DESYREL) 50 MG tablet Take 1 tablet by mouth Every Night. 90 tablet 3    • warfarin (COUMADIN) 3 MG tablet Take 3 mg by mouth 1 (One) Time Per Week. sundays      • warfarin (COUMADIN) 4 MG tablet Take 4 mg  by mouth See Admin Instructions. Mondays, Tuesdays, Wednesdays, Thursdays, Fridays and Saturdays          Allergies   Allergen Reactions   • Cephalosporins      adulthood   • Morphine And Related      unknown   • Penicillins      Penicillin G Potassium       Objective   Objective     Vital Signs:   Temp:  [98.2 °F (36.8 °C)-98.3 °F (36.8 °C)] 98.3 °F (36.8 °C)  Heart Rate:  [] 107  Resp:  [18-20] 20  BP: (176-198)/(77-85) 180/78        Physical Exam   Constitutional: She is oriented to person, place, and time. She appears frail and thin  HENT:   Head: Atraumatic. Extreme thinning of hair.     Eyes: Conjunctivae are normal.   Neck: Normal range of motion. Neck supple. Trachea midline   Cardiovascular: Mild tachy, murmur 2/6, and intact distal pulses.    Pulmonary/Chest: Effort normal. No respiratory distress although pain with deep inspiration. She has no wheezes. She exhibits tenderness (Lt lower and lateral chest wall and LLE). She exhibits no crepitus, no swelling and no retraction. Breast implants bilatera.   Abdominal: Soft. Nontender.  No guarding or rebound.  +bowel sounds.  Musculoskeletal:        Left hip: She exhibits normal range of motion, no tenderness and no deformity.        Left lower leg: She exhibits tenderness (with contusion mid tib fib) and swelling.   Neurological: She is alert and oriented to person, place, and time. A sensory deficit (Right UE and LE chronically with rt hemiparesis s/p old CVA.   RUE hand contracted) is present.   Chronic right hemiplegia.    Skin: Skin is warm. Contusions as noted to Lt lateral chest wall and LLE  Psychiatric: She has a normal mood and affect. Pleasant and cooperative.   Nursing note and vitals reviewed.       Results Reviewed:  I have personally reviewed current lab, radiology, and data and agree.      Results from last 7 days  Lab Units 02/04/18  1138   WBC 10*3/mm3 13.90*   HEMOGLOBIN g/dL 11.3*   HEMATOCRIT % 37.2   PLATELETS 10*3/mm3 377   INR   2.49       Results from last 7 days  Lab Units 02/04/18  1138   SODIUM mmol/L 143   POTASSIUM mmol/L 4.2   CHLORIDE mmol/L 107   CO2 mmol/L 31.0   BUN mg/dL 24*   CREATININE mg/dL 0.80   GLUCOSE mg/dL 114*   CALCIUM mg/dL 9.0   ALT (SGPT) U/L 15   AST (SGOT) U/L 18     Estimated Creatinine Clearance: 48 mL/min (by C-G formula based on Cr of 0.8).  Brief Urine Lab Results     None        No results found for: BNP  No results found for: PHART  Imaging Results (last 24 hours)     Procedure Component Value Units Date/Time    XR Pelvis 1 or 2 View [[id number]] Updated:  02/04/18 1153    XR Tibia Fibula 2 View Left [[id number]] Updated:  02/04/18 1154    CT Chest Without Contrast [[id number]] Collected:  02/04/18 1359     Updated:  02/04/18 1400    Narrative:       EXAMINATION: CT CHEST WO CONTRAST - 02/04/2018     INDICATION: Fall, chest pain.     TECHNIQUE: Spiral acquisition 5 mm axial images through the chest and  upper abdomen with coronal 2D reconstructions.     The radiation dose reduction device was turned on for each scan per the  ALARA (As Low as Reasonably Achievable) protocol.     COMPARISON: None.     FINDINGS: The patient's anatomy is distorted due to kyphosis. The most  inferior portions of the ribs are not included on this study. There are,  however, mildly displaced posterior left ninth and tenth rib fractures.  No definite rib fracture is identified elsewhere. There is a small area  of immediately adjacent atelectasis in the left lower lobe, trace  pleural thickening, and a minute amount of air seemingly within the  pleura. A small amount of air extends along the left lateral margin of  the chest wall external to the ribs. There is no evidence of significant  pneumothorax. No evidence of potential pulmonary contusion is seen.  There are only mild chronic- appearing lung changes elsewhere and a few  granulomatous calcifications.     Mediastinal window images show no evidence of mediastinal mass,  adenopathy  or pericardial effusion.     Included images of the upper abdomen show no significant abnormalities  of the visualized portions of the liver, spleen, pancreatic tail, or  adrenal glands. Left kidney contains a bland but intermediate to soft  tissue density dorsal midpole exophytic lesion 17 mm in diameter, much  more typical for proteinaceous cyst than for neoplasm.        Impression:       1. Mildly displaced posterior left ninth and tenth rib fractures. Small  amount of adjacent pleural thickening and minimal atelectasis. Trace  intrapleural air and small amount of air along the chest wall. No  evidence of significant pneumothorax or effusion.  2. No other evidence of acute trauma.  3. Probable small hyperdense left renal cyst. If patient has hematuria,  follow-up with renal ultrasound could be considered for further  characterization.     DICTATED:     02/04/2018  EDITED    :     02/04/2018                    Assessment/Plan   Assessment / Plan     Hospital Problem List     * (Principal)Multiple closed fractures of ribs of left side    Essential hypertension, benign    Pure hypercholesterolemia    Diabetes mellitus without complication    Overview Signed 7/20/2016  7:09 PM by Vick Diaz     Type 2 or unspecified, not stated as uncontrolled.         CVA (cerebral vascular accident)    Overview Signed 2/4/2018  3:38 PM by JAY Ellison     With Rt hemiparesis x 20+ years         Atrial fibrillation    Rheumatic heart disease    Overview Signed 9/7/2017  8:29 AM by Maryann Guillaume MD     as a child, heart murmur, no recent echos (pt. declines)         Heart murmur (Chronic)    Overview Signed 2/4/2018  3:35 PM by JAY Ellison     RHF as child.  no recent echos (pt declines)         Subcutaneous emphysema    Overview Signed 2/4/2018  3:36 PM by JAY Ellison     Acute s/p fall         Contusion of left lower extremity        84 yo female with hx of rt hemiparesis  "s/p CVA 1994 presents with lt rib and LLE pain s/p fall at home this am.  Found to have Lt 9th and 10th rib fxs and LLE contusion.  Also sq emphysema.  Hx of coumadin.  Monitor.      Assessment & Plan:  Lt rib fractures s/p Fall (ribs 9/10)  --pain meds  --PT/OT  LLE contusion  --monitor  Trace intrapleural air and sm amt of air along chest wall  --per CT chest  --no evidence of pneumo or effusion  --monitor  DM type II (A1c 5.9--9/2017)  --accuchecks, LDSSI achs for now  --hold orals  --ck A1c with am labs   Decub on coccyx (\"for a long time\" per pt/da)  --WOC  Hx CAD s/p CABG 1994- CVA and afib in OR...  --on coumadin since.  --hold coumadin for now due to fall this am with contusions  --resume asap   HTN/HLD  --home meds  --reports NO cardiologist   Hypothyroidism (per PCP office notes..the patient/fam denied)  --alopecia noted.  No thyroid meds on home list  --ck TSH      DVT prophylaxis:  No teds/seqs due injury/edema  No A/C for now due to hx of coumadin s/p fall this am.  Resume coumadin asap vs heparin sq    CODE STATUS:  Full Code  Viviana Atkinson Brenden, APRN  02/04/18   3:45 PM    Brief Attending Note   I have seen and examined the patient, performing an independent face-to-face diagnostic evaluation with plan of care reviewed and developed with the advanced practice clinician (APC).  Brief Summary Statement/HPI:   82 yo with HO stroke and long term dense right hemiparesis who slipped off the toilet today and broke some ribs.  She usually helps with transfers. Her only complaint is localized pain.  She reports weight loss, poor appetite and chronic edema of her feet.  Attending Physical Exam:  /78 (BP Location: Right arm, Patient Position: Lying)  Pulse 107  Temp 98.3 °F (36.8 °C) (Oral)   Resp 20  Ht 177.8 cm (70\")  Wt 55.1 kg (121 lb 8 oz)  SpO2 93%  BMI 17.43 kg/m2    Patient is alert and talkative in no distress at rest  Neck is without mass or JVD  Heart is Reg wo murmur, " hyperdynamic  Lungs are clear wo wheeze or crackle  Abd is soft without HSM or mass  Dense right hemiparesis  Skin is without rash, doughy skin in her ankles  Neurologic exam- clear speech, intellect intact  Mood is appropriate  Data:  Reviewed labs and rads  Brief Assessment/Plan :  Left rib fractures after a mechanical fall  HYpertension  Weight loss  Chronic leg edema  -control pain, inspirometer  - wound care and PT to see patient tomorrow.  - may need more BP meds-  Try Breeze dietary supplements.    See above for further detailed assessment and plan developed with APC which I have reviewed and/or edited.  I have discussed the patient with daughter at the bedside    I believe this patient meets obs status for now    Elina Jean MD 02/04/18 4:20 PM

## 2018-02-05 ENCOUNTER — EPISODE CHANGES (OUTPATIENT)
Dept: CASE MANAGEMENT | Facility: OTHER | Age: 83
End: 2018-02-05

## 2018-02-05 ENCOUNTER — APPOINTMENT (OUTPATIENT)
Dept: GENERAL RADIOLOGY | Facility: HOSPITAL | Age: 83
End: 2018-02-05

## 2018-02-05 LAB
ANION GAP SERPL CALCULATED.3IONS-SCNC: 3 MMOL/L (ref 3–11)
BASOPHILS # BLD AUTO: 0.04 10*3/MM3 (ref 0–0.2)
BASOPHILS NFR BLD AUTO: 0.4 % (ref 0–1)
BUN BLD-MCNC: 16 MG/DL (ref 9–23)
BUN/CREAT SERPL: 26.7 (ref 7–25)
CALCIUM SPEC-SCNC: 8.4 MG/DL (ref 8.7–10.4)
CHLORIDE SERPL-SCNC: 104 MMOL/L (ref 99–109)
CO2 SERPL-SCNC: 31 MMOL/L (ref 20–31)
CREAT BLD-MCNC: 0.6 MG/DL (ref 0.6–1.3)
DEPRECATED RDW RBC AUTO: 53.2 FL (ref 37–54)
EOSINOPHIL # BLD AUTO: 0.21 10*3/MM3 (ref 0–0.3)
EOSINOPHIL NFR BLD AUTO: 2.2 % (ref 0–3)
ERYTHROCYTE [DISTWIDTH] IN BLOOD BY AUTOMATED COUNT: 15.1 % (ref 11.3–14.5)
GFR SERPL CREATININE-BSD FRML MDRD: 96 ML/MIN/1.73
GLUCOSE BLD-MCNC: 91 MG/DL (ref 70–100)
GLUCOSE BLDC GLUCOMTR-MCNC: 129 MG/DL (ref 70–130)
GLUCOSE BLDC GLUCOMTR-MCNC: 168 MG/DL (ref 70–130)
GLUCOSE BLDC GLUCOMTR-MCNC: 216 MG/DL (ref 70–130)
GLUCOSE BLDC GLUCOMTR-MCNC: 98 MG/DL (ref 70–130)
HBA1C MFR BLD: 5.9 % (ref 4.8–5.6)
HCT VFR BLD AUTO: 31.8 % (ref 34.5–44)
HGB BLD-MCNC: 10 G/DL (ref 11.5–15.5)
IMM GRANULOCYTES # BLD: 0.03 10*3/MM3 (ref 0–0.03)
IMM GRANULOCYTES NFR BLD: 0.3 % (ref 0–0.6)
INR PPP: 1.86
LYMPHOCYTES # BLD AUTO: 2.72 10*3/MM3 (ref 0.6–4.8)
LYMPHOCYTES NFR BLD AUTO: 28 % (ref 24–44)
MCH RBC QN AUTO: 30.1 PG (ref 27–31)
MCHC RBC AUTO-ENTMCNC: 31.4 G/DL (ref 32–36)
MCV RBC AUTO: 95.8 FL (ref 80–99)
MONOCYTES # BLD AUTO: 1.02 10*3/MM3 (ref 0–1)
MONOCYTES NFR BLD AUTO: 10.5 % (ref 0–12)
NEUTROPHILS # BLD AUTO: 5.69 10*3/MM3 (ref 1.5–8.3)
NEUTROPHILS NFR BLD AUTO: 58.6 % (ref 41–71)
PLATELET # BLD AUTO: 339 10*3/MM3 (ref 150–450)
PMV BLD AUTO: 9.6 FL (ref 6–12)
POTASSIUM BLD-SCNC: 4 MMOL/L (ref 3.5–5.5)
PROTHROMBIN TIME: 20.7 SECONDS (ref 9.6–11.5)
RBC # BLD AUTO: 3.32 10*6/MM3 (ref 3.89–5.14)
SODIUM BLD-SCNC: 138 MMOL/L (ref 132–146)
T4 FREE SERPL-MCNC: 1.14 NG/DL (ref 0.89–1.76)
WBC NRBC COR # BLD: 9.71 10*3/MM3 (ref 3.5–10.8)

## 2018-02-05 PROCEDURE — 85025 COMPLETE CBC W/AUTO DIFF WBC: CPT | Performed by: NURSE PRACTITIONER

## 2018-02-05 PROCEDURE — G8978 MOBILITY CURRENT STATUS: HCPCS

## 2018-02-05 PROCEDURE — 84439 ASSAY OF FREE THYROXINE: CPT | Performed by: PHYSICIAN ASSISTANT

## 2018-02-05 PROCEDURE — 82962 GLUCOSE BLOOD TEST: CPT

## 2018-02-05 PROCEDURE — 80048 BASIC METABOLIC PNL TOTAL CA: CPT | Performed by: NURSE PRACTITIONER

## 2018-02-05 PROCEDURE — 99232 SBSQ HOSP IP/OBS MODERATE 35: CPT | Performed by: PHYSICIAN ASSISTANT

## 2018-02-05 PROCEDURE — 83036 HEMOGLOBIN GLYCOSYLATED A1C: CPT | Performed by: NURSE PRACTITIONER

## 2018-02-05 PROCEDURE — 97162 PT EVAL MOD COMPLEX 30 MIN: CPT

## 2018-02-05 PROCEDURE — G0378 HOSPITAL OBSERVATION PER HR: HCPCS

## 2018-02-05 PROCEDURE — G8979 MOBILITY GOAL STATUS: HCPCS

## 2018-02-05 PROCEDURE — 85610 PROTHROMBIN TIME: CPT

## 2018-02-05 PROCEDURE — 71046 X-RAY EXAM CHEST 2 VIEWS: CPT

## 2018-02-05 PROCEDURE — 94799 UNLISTED PULMONARY SVC/PX: CPT

## 2018-02-05 PROCEDURE — 97110 THERAPEUTIC EXERCISES: CPT

## 2018-02-05 RX ORDER — WARFARIN SODIUM 4 MG/1
4 TABLET ORAL
Status: DISCONTINUED | OUTPATIENT
Start: 2018-02-05 | End: 2018-02-10 | Stop reason: HOSPADM

## 2018-02-05 RX ORDER — WARFARIN SODIUM 3 MG/1
3 TABLET ORAL
Status: DISCONTINUED | OUTPATIENT
Start: 2018-02-11 | End: 2018-02-10 | Stop reason: HOSPADM

## 2018-02-05 RX ORDER — CASTOR OIL AND BALSAM, PERU 788; 87 MG/G; MG/G
OINTMENT TOPICAL EVERY 12 HOURS SCHEDULED
Status: DISCONTINUED | OUTPATIENT
Start: 2018-02-05 | End: 2018-02-10 | Stop reason: HOSPADM

## 2018-02-05 RX ADMIN — MULTIPLE VITAMINS W/ MINERALS TAB: TAB ORAL at 08:15

## 2018-02-05 RX ADMIN — INSULIN LISPRO 2 UNITS: 100 INJECTION, SOLUTION INTRAVENOUS; SUBCUTANEOUS at 20:26

## 2018-02-05 RX ADMIN — DIGOXIN 250 MCG: 250 TABLET ORAL at 08:15

## 2018-02-05 RX ADMIN — LACTULOSE 10 G: 20 SOLUTION ORAL at 08:17

## 2018-02-05 RX ADMIN — HYDROCODONE BITARTATE AND ACETAMINOPHEN 1 TABLET: 5; 325 TABLET ORAL at 13:52

## 2018-02-05 RX ADMIN — HYDROCODONE BITARTATE AND ACETAMINOPHEN 1 TABLET: 5; 325 TABLET ORAL at 11:03

## 2018-02-05 RX ADMIN — LOSARTAN POTASSIUM AND HYDROCHLOROTHIAZIDE 2 TABLET: 12.5; 5 TABLET ORAL at 08:15

## 2018-02-05 RX ADMIN — WARFARIN SODIUM 4 MG: 4 TABLET ORAL at 17:36

## 2018-02-05 RX ADMIN — ATORVASTATIN CALCIUM 40 MG: 40 TABLET, FILM COATED ORAL at 08:15

## 2018-02-05 RX ADMIN — TRAZODONE HYDROCHLORIDE 50 MG: 50 TABLET ORAL at 20:16

## 2018-02-05 RX ADMIN — HYDROCODONE BITARTATE AND ACETAMINOPHEN 1 TABLET: 5; 325 TABLET ORAL at 17:36

## 2018-02-05 RX ADMIN — CASTOR OIL AND BALSAM, PERU: 788; 87 OINTMENT TOPICAL at 17:36

## 2018-02-05 RX ADMIN — HYDROCODONE BITARTATE AND ACETAMINOPHEN 1 TABLET: 5; 325 TABLET ORAL at 05:07

## 2018-02-05 RX ADMIN — HYDROCODONE BITARTATE AND ACETAMINOPHEN 1 TABLET: 5; 325 TABLET ORAL at 23:03

## 2018-02-05 RX ADMIN — HYDROCODONE BITARTATE AND ACETAMINOPHEN 1 TABLET: 5; 325 TABLET ORAL at 01:30

## 2018-02-05 RX ADMIN — METOPROLOL SUCCINATE 25 MG: 25 TABLET, EXTENDED RELEASE ORAL at 08:16

## 2018-02-05 RX ADMIN — INSULIN LISPRO 3 UNITS: 100 INJECTION, SOLUTION INTRAVENOUS; SUBCUTANEOUS at 17:36

## 2018-02-05 NOTE — PLAN OF CARE
Problem: Patient Care Overview (Adult)  Goal: Plan of Care Review  Outcome: Ongoing (interventions implemented as appropriate)   02/05/18 1400   Coping/Psychosocial Response Interventions   Plan Of Care Reviewed With patient;spouse   Patient Care Overview   Progress no change   Outcome Evaluation   Outcome Summary/Follow up Plan Patient consult for possible pressure ulcer on right gluteal (present on admission). Patient and family report area has been present for many years. Presents as Stage 3 -edges calloused-wound bed clean. Venelex ordered. Patient on waffle mattress and reports comfort with bed-prefers to sit in chair. All skin interventions in place and implemented. No other concerns at this time-WOCN will sign off. Please notify for any questions or concerns.        Problem: Pressure Ulcer Risk (Grey Scale) (Adult,Obstetrics,Pediatric)  Goal: Identify Related Risk Factors and Signs and Symptoms  Outcome: Ongoing (interventions implemented as appropriate)   02/05/18 1400   Pressure Ulcer Risk (Grey Scale)   Related Risk Factors (Pressure Ulcer Risk (Grey Scale)) age extremes;mobility impaired     Goal: Skin Integrity  Outcome: Ongoing (interventions implemented as appropriate)   02/05/18 1400   Pressure Ulcer Risk (Grey Scale) (Adult,Obstetrics,Pediatric)   Skin Integrity making progress toward outcome

## 2018-02-05 NOTE — PROGRESS NOTES
[facility] Medicine Services  PROGRESS NOTE    Patient Name: Kristin Magdaleno  : 1935  MRN: [id number]    Date of Admission: 2018  Length of Stay: 0  Primary Care Physician: Maryann Guillaume MD    Subjective     CC: follow up L rib fractures    HPI:  Sitting upright in chair, PT assisted with transfer this morning. She required assist x 2. Patient currently comfortable at rest but has pain with movement and inspiration. Daughter at bedside, she is concerned about returning home. Patient lives with her  who is usually able to assist her with transfers.     Review of Systems  Gen- No fevers, chills  CV- No chest pain, palpitations  Resp- No cough, dyspnea  GI- No N/V/D, abd pain  Musk- L rib pain, worse with inspiration/movement    Otherwise ROS is negative except as mentioned in the HPI.    Objective     Vital Signs:   Temp:  [98 °F (36.7 °C)-99 °F (37.2 °C)] 98 °F (36.7 °C)  Heart Rate:  [] 95  Resp:  [16-20] 20  BP: (146-180)/(57-94) 173/89        Physical Exam:  Constitutional: Thin, elderly female. NAD. Awake, alert, pleasant and conversant.   HENT: NCAT, mucous membranes moist  Respiratory: Clear to auscultation bilaterally, respiratory effort normal   Cardiovascular: RRR, no murmurs, rubs, or gallops, palpable pedal pulses bilaterally  Gastrointestinal: Positive bowel sounds, soft, nontender, nondistended  Musculoskeletal: No bilateral ankle edema  Psychiatric: Appropriate affect, cooperative  Neurologic: Oriented x 3, strength symmetric in all extremities, Cranial Nerves grossly intact to confrontation, speech clear  Skin: No rashes      Results Reviewed:  I have personally reviewed current lab, radiology, and data and agree.      Results from last 7 days  Lab Units 18  0601 18  1138 18   WBC 10*3/mm3 9.71 13.90*  --    HEMOGLOBIN g/dL 10.0* 11.3*  --    HEMATOCRIT % 31.8* 37.2  --    PLATELETS 10*3/mm3 339 377  --    INR  1.86 2.49 2.30        Results from last 7 days  Lab Units 02/05/18  0601 02/04/18  1138   SODIUM mmol/L 138 143   POTASSIUM mmol/L 4.0 4.2   CHLORIDE mmol/L 104 107   CO2 mmol/L 31.0 31.0   BUN mg/dL 16 24*   CREATININE mg/dL 0.60 0.80   GLUCOSE mg/dL 91 114*   CALCIUM mg/dL 8.4* 9.0   ALT (SGPT) U/L  --  15   AST (SGOT) U/L  --  18     Estimated Creatinine Clearance: 48 mL/min (by C-G formula based on Cr of 0.6).  No results found for: BNP  No results found for: PHART    Microbiology Results Abnormal     None        Imaging Results (last 24 hours)     Procedure Component Value Units Date/Time    XR Tibia Fibula 2 View Left [[id number]] Collected:  02/04/18 1637     Updated:  02/05/18 0641    Narrative:       EXAMINATION: XR LEFT TIBIA FIBULA, 2 VIEWS - 02/04/2018     INDICATION: Fall, pain.      COMPARISON: None.     FINDINGS: Left tibia and fibula appear intact along their lengths. Knee  joint and ankle joint appear anatomic. There appears to be some medial  soft tissue thickening about the ankle. There is a suggestion of a small  knee joint effusion.       Impression:       Soft tissue thickening about the ankle and small knee joint  effusion. No acute bony abnormality is seen.     DICTATED:     02/04/2018  EDITED    :     02/04/2018      This report was finalized on 2/5/2018 6:39 AM by DR. Maximilian Green MD.       XR Pelvis 1 or 2 View [[id number]] Collected:  02/04/18 1636     Updated:  02/05/18 0643    Narrative:       EXAMINATION: XR PELVIS, 1-2 VIEWS - 02/04/2018     INDICATION: Fall, pain.      COMPARISON: None.     FINDINGS: Bones of the pelvis appear osteopenic but intact. Left hip  joint appears essentially normal. Joint spaces are well-maintained.  Right femoral prosthesis appears to be in anatomical alignment. Included  portion of the proximal right femur appears intact.           Impression:       No evidence of acute trauma.     DICTATED:     02/04/2018  EDITED    :     02/04/2018      This report was finalized on  2/5/2018 6:41 AM by DR. Maximilian Green MD.       CT Chest Without Contrast [[id number]] Collected:  02/04/18 1359     Updated:  02/05/18 0752    Narrative:       EXAMINATION: CT CHEST WO CONTRAST - 02/04/2018     INDICATION: Fall, chest pain.     TECHNIQUE: Spiral acquisition 5 mm axial images through the chest and  upper abdomen with coronal 2D reconstructions.     The radiation dose reduction device was turned on for each scan per the  ALARA (As Low as Reasonably Achievable) protocol.     COMPARISON: None.     FINDINGS: The patient's anatomy is distorted due to kyphosis. The most  inferior portions of the ribs are not included on this study. There are,  however, mildly displaced posterior left ninth and tenth rib fractures.  No definite rib fracture is identified elsewhere. There is a small area  of immediately adjacent atelectasis in the left lower lobe, trace  pleural thickening, and a minute amount of air seemingly within the  pleura. A small amount of air extends along the left lateral margin of  the chest wall external to the ribs. There is no evidence of significant  pneumothorax. No evidence of potential pulmonary contusion is seen.  There are only mild chronic- appearing lung changes elsewhere and a few  granulomatous calcifications.     Mediastinal window images show no evidence of mediastinal mass,  adenopathy or pericardial effusion.     Included images of the upper abdomen show no significant abnormalities  of the visualized portions of the liver, spleen, pancreatic tail, or  adrenal glands. Left kidney contains a bland but intermediate to soft  tissue density dorsal midpole exophytic lesion 17 mm in diameter, much  more typical for proteinaceous cyst than for neoplasm.        Impression:       1. Mildly displaced posterior left ninth and tenth rib fractures. Small  amount of adjacent pleural thickening and minimal atelectasis. Trace  intrapleural air and small amount of air along the chest wall.  No  evidence of significant pneumothorax or effusion.  2. No other evidence of acute trauma.  3. Probable small hyperdense left renal cyst. If patient has hematuria,  follow-up with renal ultrasound could be considered for further  characterization.     DICTATED:     02/04/2018  EDITED    :     02/04/2018         This report was finalized on 2/5/2018 7:50 AM by DR. Maximilian Green MD.       XR Chest PA & Lateral [[id number]] Collected:  02/05/18 1219     Updated:  02/05/18 1219    Narrative:       EXAMINATION: XR CHEST, PA AND LATERAL-02/05/2018:      INDICATION: F/U fall/sq air after left rib fractures; S22.42XA-Multiple  fractures of ribs, left side, initial encounter for closed fracture;  S80.12XA-Contusion of left lower leg, initial encounter;  W19.XXXA-Unspecified fall, initial encounter; T79.7XXA-Traumatic  subcutaneous emphysema, initial encounter; Z74.09-Other reduced  mobility.      COMPARISON: NONE.     FINDINGS: PA and lateral views of the chest reveal the cardiac  silhouette to be borderline enlarged. There is patchy ill-defined  opacification seen within the left lung base. Degenerative changes seen  within the spine. Nondisplaced left lower posterior lateral ribs are  present at least on ribs 7, 8 and 9.           Impression:       Increased markings seen in the left lung base with  nondisplaced fracture seen of at least the left posterior lateral  seventh, eighth and ninth ribs.     D:  02/05/2018  E:  02/05/2018                 I have reviewed the medications.    Assessment / Plan     Hospital Problem List     * (Principal)Multiple closed fractures of ribs of left side    Essential hypertension, benign    Pure hypercholesterolemia    Diabetes mellitus without complication    Overview Signed 7/20/2016  7:09 PM by Vick Diaz     Type 2 or unspecified, not stated as uncontrolled.         CVA (cerebral vascular accident)    Overview Signed 2/4/2018  3:38 PM by JAY Ellison     With Rt  hemiparesis x 20+ years         Atrial fibrillation    Rheumatic heart disease    Overview Signed 9/7/2017  8:29 AM by Maryann Guillaume MD     as a child, heart murmur, no recent echos (pt. declines)         Heart murmur (Chronic)    Overview Signed 2/4/2018  3:35 PM by JAY Ellison     RHF as child.  no recent echos (pt declines)         Subcutaneous emphysema    Overview Signed 2/4/2018  3:36 PM by JAY Ellison     Acute s/p fall         Contusion of left lower extremity    Renal cyst        Brief Hospital Course to date:  Kristin Magdaleno is a 83 y.o. 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 She presented to [facility] ED on 2/4/18 after a fall at home, sustaining multiple closed L rib fractures with development of subcutaneous edema.     9th and 10th L rib fractures  LLE contusion  Fall at home  - Continue PT/OT, case management following for possible rehab options at discharge  - Pain control PRN  - Incentive spirometry - teaching and encouragement. High risk of developing subsequent pneumonia   - No LLE fracture on radiology, mild medial ankle effusion appears stable     Trace intrapleural air and subcutaneous emphysema  - No evidence of pneumothorax or pleural effusion  - Continue to monitor  - AM CXR appears stable, some increased markings along L lung base    Non-insulin dependent DMII  - Continue accuchecks and SSI  - Using little to no insulin  - Hgb A1c 5.9%     Chronic coccygeal decubitus ulcer  - WOC to evaluate     History of CAD s/p CABG   Intraoperative atrial fibrillation/CVA with residual R hemiparesis  - Maintained on Coumadin therapy  - Coumadin held upon admission due to fall with contusions  - Hemoglobin appears stable   - CHADS VASC = 7 (11% annual risk of stroke) - HAS BLED = 2 (4.1% annual risk of bleeding)   - Discussed with patient and , they  would like to resume Coumadin. Pharmacy to dose while in house     Hypothyroidism   - TSH elevated, however T4 normal  - Patient/family previously opted to stop Synthroid with guidance from PCP     Hypertension, continue home meds  Hyperlipidemia, statin     DVT Prophylaxis:  Mechanical  CODE STATUS: Full Code    Disposition: I expect the patient to be discharged home vs SNF tomorrow    Suni Barrios PA-C  02/05/18  1:44 PM

## 2018-02-05 NOTE — PROGRESS NOTES
Discharge Planning Assessment  [facility]     Patient Name: Kristin Magdaleno  MRN: [id number]  Today's Date: 2/5/2018    Admit Date: 2/4/2018          Discharge Needs Assessment       02/05/18 1445    Living Environment    Lives With spouse    Living Arrangements house    Home Accessibility grab bars present (toilet);bed and bath on same level;stairs to enter home;tub/shower is not walk in;house is not wheelchair accessible    Type of Financial/Environmental Concern none    Transportation Available car;family or friend will provide    Living Environment    Provides Primary Care For no one, unable/limited ability to care for self    Primary Care Provided By spouse/significant other    Quality Of Family Relationships supportive;helpful;involved    Able to Return to Prior Living Arrangements yes    Discharge Needs Assessment    Concerns To Be Addressed discharge planning concerns    Readmission Within The Last 30 Days no previous admission in last 30 days    Equipment Currently Used at Home bath bench;wheelchair;raised toilet    Current Discharge Risk physical impairment    Discharge Disposition still a patient    Discharge Contact Information if Applicable Lauryn Bah, daughter, [phone]            Discharge Plan       02/05/18 1446    Case Management/Social Work Plan    Plan TBD    Patient/Family In Agreement With Plan yes    Additional Comments Met with patient and her daughter in the room to discuss discharge planning. Patient lives with her  in a home in [facility]. She is independent with dressing herself and usually with transfers. Her daughters assist with bathing. Patient uses a wheelchair at all times with mobility. She is in observation status under Medicare and, as such, her rehab option is limited to acute rehab at [facility]. Patient is interested in inquiring if she would be a candidate for [facility]. Referral called to [facility]. She will evaluate patient for admission. If [facility] is unable to  accept patient she could discharge home with home health for PT/OT and skilled nursing if interested. Family will transport. CM will continue to follow.         Discharge Placement     No information found        Expected Discharge Date and Time     Expected Discharge Date Expected Discharge Time    Feb 6, 2018               Demographic Summary       02/05/18 1424    Referral Information    Admission Type observation    Referral Source admission list    Reason For Consult discharge planning    Record Reviewed history and physical;clinical discipline documentation    Contact Information    Permission Granted to Share Information With ;family/designee   daughter, Sara Bah    Primary Care Physician Information    Name Maryann Guillaume            Functional Status       02/05/18 1444    Functional Status Prior    Ambulation 1-->assistive equipment    Transferring 1-->assistive equipment    Toileting 1-->assistive equipment    Bathing 2-->assistive person    Dressing 0-->independent    Eating 0-->independent    Communication 0-->understands/communicates without difficulty    Swallowing 0-->swallows foods/liquids without difficulty    Employment/Financial    Employment/Finance Comments Confirmed that patient has medical coverage through Medicare A & B and [employer]; patient thinks she has a Medicare D plan; denies issues affording meds            Psychosocial     None            Abuse/Neglect     None            Legal     None            Substance Abuse     None            Patient Forms     None          Kristin Gant

## 2018-02-05 NOTE — PLAN OF CARE
Problem: Patient Care Overview (Adult)  Goal: Plan of Care Review  Outcome: Ongoing (interventions implemented as appropriate)   02/04/18 1519 02/04/18 2000   Coping/Psychosocial Response Interventions   Plan Of Care Reviewed With --  patient;family   Patient Care Overview   Progress no change --    Outcome Evaluation   Outcome Summary/Follow up Plan Received from ED in stable condition. Hypertensive but otherwise VSS. NSR in 80s-90s. Family at bedside. Stage 3 coccyx? WOC consulted for advice/opinion. Hospitalist managing care, pharmacy dosing coumadin --      Goal: Adult Individualization and Mutuality  Outcome: Ongoing (interventions implemented as appropriate)    Goal: Discharge Needs Assessment  Outcome: Ongoing (interventions implemented as appropriate)   02/04/18 1400   Living Environment   Transportation Available car;family or friend will provide   Self-Care   Equipment Currently Used at Home wheelchair;shower chair  (leg brace and sling on right arm)       Problem: Fall Risk (Adult)  Goal: Identify Related Risk Factors and Signs and Symptoms  Outcome: Ongoing (interventions implemented as appropriate)   02/04/18 1519   Fall Risk   Fall Risk: Related Risk Factors age-related changes;history of falls  (h/o CVA)   Fall Risk: Signs and Symptoms presence of risk factors     Goal: Absence of Falls  Outcome: Ongoing (interventions implemented as appropriate)   02/04/18 1519   Fall Risk (Adult)   Absence of Falls making progress toward outcome       Problem: Pressure Ulcer Risk (Grey Scale) (Adult,Obstetrics,Pediatric)  Goal: Identify Related Risk Factors and Signs and Symptoms  Outcome: Ongoing (interventions implemented as appropriate)   02/04/18 1519   Pressure Ulcer Risk (Grey Scale)   Related Risk Factors (Pressure Ulcer Risk (Grey Scale)) age extremes  (frailty)     Goal: Skin Integrity  Outcome: Ongoing (interventions implemented as appropriate)   02/04/18 1519   Pressure Ulcer Risk (Grey Scale)  (Adult,Obstetrics,Pediatric)   Skin Integrity making progress toward outcome

## 2018-02-05 NOTE — CONSULTS
"Adult Nutrition  Assessment/PES    Patient Name:  Kristin Magdaleno  YOB: 1935  MRN: [id number]  Admit Date:  2/4/2018    Assessment Date:  2/5/2018    Comments:  Pt 81% of IBW, reports decreased intake but weight loss amount and timeframe unclear.  Pt eating during visit, asked to return at another time to complete NFPE.  Will attempt in a.m.  Will continue to follow.           Reason for Assessment       02/05/18 1430    Reason for Assessment    Reason For Assessment/Visit identified at risk by screening criteria;nurse/nurse practitioner consult    Identified At Risk By Screening Criteria MST SCORE 2+;unintentional loss of 10 lbs or more in the past 2 mos    Time Spent (min) 30    Ortho Fracture   Multiple closed rib fx's.    Skin Pressure ulcer   Chronic decubitus on coccyx. Not staged.     Other diagnosis PMHx: CAD, DM, HTN, Rheumatic heart dz.               Nutrition/Diet History       02/05/18 1432    Nutrition/Diet History    Reported/Observed By Patient    Appetite Fair    Other Pt states she feels as though she has lost weight but cannot tell me her UBW.  States she is wheelchair bound and cannot stand to be weighed at her MD's office. States her clothes fit looser.             Anthropometrics       02/05/18 1436    Anthropometrics    RD Documented Weight on Admission 55.1 kg (121 lb 8 oz)   Bed weight on admission.    Anthropometrics (Special Considerations)    Height Used for Calculations 1.778 m (5' 10\")    Weight Used for Calculations 55.1 kg (121 lb 8 oz)    RD Calculated     RD Calculated % IBW 81    RD Calculated BMI (kg/m2) 17.43    Ideal Body Weight (IBW)    % Ideal Body Weight Malnutrition 80-90% - mild deficit    Usual Body Weight (UBW)    Usual Body Weight --   Unable to find a weight in EMR and pt does not know her UBW.  States she has not been weighed in a long time.     Weight Loss --   Uncertain    Weight Loss Time Frame Pt reports weight loss in the past year of an " undetermined amount.             Labs/Tests/Procedures/Meds       02/05/18 1439    Labs/Tests/Procedures/Meds    Labs/Tests Review Reviewed    Medication Review Reviewed, pertinent;Anticoag                Nutrition Prescription Ordered       02/05/18 1440    Nutrition Prescription PO    Current PO Diet Regular    Supplement Boost HP    Supplement Frequency 2 times a day            Evaluation of Received Nutrient/Fluid Intake       02/05/18 1440    PO Evaluation    Number of Meals 3    % PO Intake 50              Malnutrition Severity Assessment       02/05/18 1446    Malnutrition Severity Assessment    Malnutrition Type --   Pt eating at time of visit, asked to come back to complete NFPE.  Will follow up in a.m.        Problem/Interventions:        Problem 1       02/05/18 1441    Nutrition Diagnoses Problem 1    Problem 1 Underweight    Etiology (related to) Other (comment)   clinical condition/immoblitiy    Signs/Symptoms (evidenced by) % IBW    Percent (%) IBW 81 %            Problem 2       02/05/18 1442    Nutrition Diagnoses Problem 2    Problem 2 Inadequate Intake/Infusion    Inadequate Intake Type Oral    Etiology (related to) Other (comment)   clinical condition/skin integrity    Signs/Symptoms (evidenced by) Report of Minimal PO Intake                  Intervention Goal       02/05/18 1443    Intervention Goal    General Nutrition support treatment    PO Increase intake            Nutrition Intervention       02/05/18 1443    Nutrition Intervention    RD/Tech Action Encourage intake;Adjusted supplement;Follow Tx progress;Care plan reviewd;Interview for preference;Advise alternate selection            Nutrition Prescription       02/05/18 1443    Nutrition Prescription PO    PO Prescription Begin/change supplement    Supplement Boost HP    Supplement Frequency 3 times a day    New PO Prescription Ordered? Yes            Education/Evaluation       02/05/18 1443    Monitor/Evaluation    Monitor Per  protocol;PO intake;Supplement intake;Pertinent labs;Skin status        Electronically signed by:  Ester Arguelles  02/05/18 2:47 PM

## 2018-02-05 NOTE — PROGRESS NOTES
"Pharmacy Consult  -  Warfarin    Kristin Magdaleno is an 84 yo woman admitted 2/4/18 after a fall at home, sustaining multiple closed L rib fractures with development of subcutaneous edema. PMH significant for HTN, hyperlipidemia, non-insulin dependent DMII and CAD s/p CABG with intraoperative atrial fibrillation/CVA with residual R hemiparesis.  She is maintained on warfarin therapy, which was held upon admission due to fall with contusions.  CHADS VASC = 7 (11% annual risk of stroke) - HAS BLED = 2 (4.1% annual risk of bleeding).  Provider notes that risks of anticoagulation discussed with patient and , they would like to resume warfarin. Pharmacy to dose while in house.    Height - 177.8 cm (70\")  Weight - 55.1 kg (121 lb 8 oz)    Consulting Provider: - ISAEL Barrios  Indication: - Afib  Goal INR:  2-3  Home Regimen:   - Warfarin 4 mg po on Mon, Tues, Wed, Thurs, Fri and Sat   - Warfarin 3 mg po on Sundays only    Bridge Therapy: No    Drug-Drug Interactions with current regimen: None at present    Warfarin Dosing During Admission:    Date  2/5           INR  1.86           Dose  4 mg              Education Provided:  Pending    Labs:    Results from last 7 days     Lab Units 02/05/18  0601 02/04/18  1138 01/31/18   INR  1.86 2.49 2.30   HEMOGLOBIN g/dL 10.0* 11.3* --    HEMATOCRIT % 31.8* 37.2 --    PLATELETS 10*3/mm3 339 377 --        Results from last 7 days     Lab Units 02/05/18  0601 02/04/18  1138   SODIUM mmol/L 138 143   POTASSIUM mmol/L 4.0 4.2   CHLORIDE mmol/L 104 107   CO2 mmol/L 31.0 31.0   BUN mg/dL 16 24*   CREATININE mg/dL 0.60 0.80   CALCIUM mg/dL 8.4* 9.0   BILIRUBIN mg/dL --  0.4   ALK PHOS U/L --  65   ALT (SGPT) U/L --  15   AST (SGOT) U/L --  18   GLUCOSE mg/dL 91 114*   Current dietary intake: Regular diet with Boost HP.  Consuming 50% of meals.    Assessment/Plan:     Will resume patient's home dose of warfarin and monitor daily INRs.  Pharmacy Service will continue to follow the " patient's clinical progress and monitor for evidence of warfarin-induced adverse effects.    Nighat Lorenzo, PharmD, BCPS

## 2018-02-05 NOTE — THERAPY EVALUATION
Acute Care - Physical Therapy Initial Evaluation  [facility]     Patient Name: Kristin Magdaleno  : 1935  MRN: [id number]  Today's Date: 2018   Onset of Illness/Injury or Date of Surgery Date: 18  Date of Referral to PT: 18  Referring Physician: JAY Wilcox      Admit Date: 2018     Visit Dx:    ICD-10-CM ICD-9-CM   1. Closed fracture of multiple ribs of left side, initial encounter S22.42XA 807.09   2. Contusion of left lower extremity, initial encounter S80.12XA 924.5   3. Fall, initial encounter W19.XXXA E888.9   4. Subcutaneous emphysema, initial encounter T79.7XXA 958.7   5. Impaired functional mobility, balance, gait, and endurance Z74.09 V49.89     Patient Active Problem List   Diagnosis   • Essential hypertension, benign   • Pure hypercholesterolemia   • Diabetes mellitus without complication   • Coronary atherosclerosis   • CVA (cerebral vascular accident)   • Atrial fibrillation   • Rheumatic heart disease   • Multiple closed fractures of ribs of left side   • Heart murmur   • Subcutaneous emphysema   • Contusion of left lower extremity   • Renal cyst     Past Medical History:   Diagnosis Date   • Atrial fibrillation    • CAD (coronary artery disease)     s/p cabg   • Chronic anticoagulation    • Diabetes mellitus     type II    • H/O hemiparesis     Rt sided    • Heart murmur     RHF as child.  no recent echos (pt declines)   • Hyperlipidemia    • Hypertension    • Hypothyroid    • Rheumatic heart disease     as a child, heart murmur, no recent echos (pt. declines)   • Stroke     20+ years ago in OR during CABG      Past Surgical History:   Procedure Laterality Date   • APPENDECTOMY     • BREAST SURGERY      no breast tissue, never had tara, mel. breast implants   • CORONARY ARTERY BYPASS GRAFT      CVA  and a fib post-op - on coumadin since   • TOTAL HIP ARTHROPLASTY REVISION Right           PT ASSESSMENT (last 72 hours)      PT Evaluation       18 0845  02/04/18 1400    Rehab Evaluation    Document Type evaluation  -LR     Subjective Information agree to therapy;complains of;pain  -LR     Patient Effort, Rehab Treatment good  -LR     Symptoms Noted During/After Treatment fatigue;increased pain  -LR     Symptoms Noted Comment Notified RN.   -LR     General Information    Patient Profile Review yes  -LR     Onset of Illness/Injury or Date of Surgery Date 02/04/18  -LR     Referring Physician JAY Wilcox  -LR     General Observations Patient supine in bed upon arrival with daughter present. Pulse ox, tele, O2, R UE contracted, R LE internally rotated, external catheter  -LR     Pertinent History Of Current Problem Patient presented to ED after getting up from toilet and losing her balance and falling onto bathtub, with impact to her L chest wall. Patient also c/o L leg pain after fall. Patient unable to get up after fall.   -LR     Precautions/Limitations fall precautions;other (see comments);brace on when up   chronic R sided hemiplegia, R LE brace,rib 9,10 fx on L  -LR     Prior Level of Function dependent:;all household mobility;community mobility;gait;home management;cooking;cleaning;driving;shopping;using stairs;mod assist:;bathing;min assist:;transfer;bed mobility;independent:;dressing   uses w/c for mobility, can stand pivot t/f on L LE  -LR     Equipment Currently Used at Home bath bench;wheelchair;raised toilet   w/c for long distance mobility,   -LR wheelchair;shower chair   leg brace and sling on right arm  -LH    Plans/Goals Discussed With patient;spouse/S.O.;agreed upon  -LR     Risks Reviewed patient:;spouse/S.O.:;LOB;nausea/vomiting;dizziness;increased discomfort;lines disloged  -LR     Benefits Reviewed patient:;spouse/S.O.:;improve function;increase independence;increase strength;increase balance;decrease pain;increase knowledge  -LR     Barriers to Rehab previous functional deficit;medically complex  -LR     Living Environment    Lives With  spouse  -LR spouse  -LH    Living Arrangements house  -LR house  -LH    Home Accessibility bed and bath on same level;house is not wheelchair accessible;stairs to enter home;tub/shower is not walk in   goes up step in w/c  -LR no concerns  -LH    Number of Stairs to Enter Home 1  -LR     Stair Railings at Home none  -LR none  -LH    Type of Financial/Environmental Concern none  -LR none  -LH    Transportation Available family or friend will provide  -LR car;family or friend will provide  -LH    Living Environment Comment  available to assist at all times upon d/c home. Daughters available to assist as needed.   -LR     Clinical Impression    Date of Referral to PT 02/04/18  -LR     PT Diagnosis L 9th/10th rib fx and L LE contusion after fall at home  -LR     Prognosis good  -LR     Patient/Family Goals Statement decrease pain  -LR     Criteria for Skilled Therapeutic Interventions Met yes;treatment indicated  -LR     Rehab Potential good, to achieve stated therapy goals  -LR     Vital Signs    Pre Systolic BP Rehab 161  -LR     Pre Treatment Diastolic BP 73  -LR     Pretreatment Heart Rate (beats/min) 88  -LR     Posttreatment Heart Rate (beats/min) 84  -LR     Pre SpO2 (%) 91  -LR     O2 Delivery Pre Treatment room air  -LR     Post SpO2 (%) 92  -LR     O2 Delivery Post Treatment room air  -LR     Pre Patient Position Supine  -LR     Post Patient Position Sitting  -LR     Pain Assessment    Pain Assessment Sanchez-Bustillo FACES   pt would not give numerical rating   -LR     Sanchez-Bustillo FACES Pain Rating 6  -LR     Pain Type Acute pain  -LR     Pain Location Chest  -LR     Pain Orientation Left  -LR     Pain Intervention(s) Repositioned;Ambulation/increased activity  -LR     Vision Assessment/Intervention    Visual Impairment WFL  -LR     Cognitive Assessment/Intervention    Current Cognitive/Communication Assessment impaired  -LR     Orientation Status oriented to;person;place;situation;required verbal cueing  (specifiy in comments);disoriented to;time   could not state month or year  -LR     Follows Commands/Answers Questions 100% of the time;able to follow single-step instructions;needs cueing;needs repetition  -LR     Personal Safety WNL/WFL  -LR     ROM (Range of Motion)    General ROM upper extremity range of motion deficits identified;lower extremity range of motion deficits identified  -LR     General UE Assessment    ROM shoulder, right: UE ROM deficit;scapula, right: UE ROM deficit;elbow/forearm, right: UE ROM deficit;wrist, right: UE ROM deficit;LUE ROM was WFL  -LR     ROM Detail chronic R UE flexion contracture d/t prior CVA  -LR     General LE Assessment    ROM LLE ROM was WFL;hip, right: LE ROM deficit;knee, right: LE ROM deficit;ankle, right: LE ROM deficit  -LR     ROM Detail R LE AROM impaired 100% d/t prior stroke, PROM impaired 25%  -LR     MMT (Manual Muscle Testing)    General MMT Assessment lower extremity strength deficits identified;upper extremity strength deficits identified  -LR     Upper Extremity    Upper Ext Manual Muscle Testing left UE strength is WFL;right shoulder strength deficit;right scapular strength deficit;right elbow/forearm strength deficit;right wrist strength deficit  -LR     Upper Ext Manual Muscle Testing Detail R UE functionally 0/5  -LR     Lower Extremity    Lower Ext Manual Muscle Testing left LE strength is WFL;right hip strength deficit;right knee strength deficit;right ankle strength deficit  -LR     Lower Ext Manual Muscle Testing Detail R LE 0/5  -LR     Bed Mobility, Assessment/Treatment    Bed Mobility, Assistive Device head of bed elevated;bed rails;draw sheet  -LR     Bed Mob, Supine to Sit, [place] verbal cues required;maximum assist (25% patient effort)  -LR     Bed Mob, Sit to Supine, [place] not tested   UI at end of eval.   -LR     Bed Mobility, Safety Issues impaired trunk control for bed mobility;decreased use of legs for  bridging/pushing;decreased use of arms for pushing/pulling  -LR     Bed Mobility, Impairments ROM decreased;strength decreased;decreased flexibility;muscle tone abnormal;pain  -LR     Bed Mobility, Comment Verbal cues to move LEs towards EOB and to push up from bed to raise trunk into sitting. Max assist required at trunk and hips to sit up. Assist via draw sheet to scoot hips out to get feet on floor.   -LR     Transfer Assessment/Treatment    Transfers, Bed-Chair [place] verbal cues required;maximum assist (25% patient effort);1 person + 1 person to manage equipment  -LR     Transfers, Chair-Bed [place] not tested   UI at end of eval.   -LR     Transfers, Bed-Chair-Bed, Assist Device other (see comments)   Assist at gait belt  -LR     Transfers, Sit-Stand [place] verbal cues required;maximum assist (25% patient effort);1 person + 1 person to manage equipment  -LR     Transfers, Stand-Sit [place] verbal cues required;maximum assist (25% patient effort);1 person + 1 person to manage equipment  -LR     Transfers, Sit-Stand-Sit, Assist Device other (see comments)   assist at gait belt.   -LR     Transfer, Safety Issues sequencing ability decreased;step length decreased;weight-shifting ability decreased;balance decreased during turns  -LR     Transfer, Impairments muscle tone abnormal;decreased flexibility;ROM decreased;strength decreased;impaired balance;pain  -LR     Transfer, Comment Verbal cues to lean forward and push up with L UE to stand and to pivot or take steps from bed to chair. Patient able to take one step on L LE to turn and sit in chair. R LE brace donned for transfers.   -LR     Gait Assessment/Treatment    Gait, [place] Level not appropriate to assess  -LR     Gait, Comment Patient is non-ambulatory at baseline.  -LR     Motor Skills/Interventions    Additional Documentation Balance Skills Training (Group)  -LR     Balance Skills Training    Sitting-Level of Assistance Contact  guard  -LR     Sitting-Balance Support Feet supported;Left upper extremity supported  -LR     Sitting-Balance Activities Forward lean;Left UE Weight Bearing;Trunk control activities  -LR     Sitting # of Minutes 2  -LR     Sensory Assessment/Intervention    Light Touch RLE;RUE  -LR     RUE Light Touch severe impairment  -LR     RLE Light Touch severe impairment  -LR     Positioning and Restraints    Pre-Treatment Position in bed  -LR     Post Treatment Position chair  -LR     In Chair notified nsg;reclined;sitting;call light within reach;encouraged to call for assist;exit alarm on;with family/caregiver;on mechanical lift sling;legs elevated;RUE elevated;LUE elevated;R AFO  -LR       User Key  (r) = Recorded By, (t) = Taken By, (c) = Cosigned By    Initials Name Provider Type    LR Kari Diaz, PT Physical Therapist     Noemi Garcia, RN Registered Nurse          Physical Therapy Education     Title: PT OT SLP Therapies (Done)     Topic: Physical Therapy (Done)     Point: Mobility training (Done)    Learning Progress Summary    Learner Readiness Method Response Comment Documented by Status   Patient Acceptance E,D VU,NR Educated on progression of POC, goals. Benefits of mobility.  02/05/18 0941 Done   Family Acceptance E,D VU,NR Educated on progression of POC, goals. Benefits of mobility.  02/05/18 0941 Done               Point: Home exercise program (Done)    Learning Progress Summary    Learner Readiness Method Response Comment Documented by Status   Patient Acceptance E,D VU,NR Educated on progression of POC, goals. Benefits of mobility.  02/05/18 0941 Done   Family Acceptance ED VU,NR Educated on progression of POC, goals. Benefits of mobility.  02/05/18 0941 Done               Point: Body mechanics (Done)    Learning Progress Summary    Learner Readiness Method Response Comment Documented by Status   Patient Acceptance E,D VU,NR Educated on progression of POC, goals. Benefits of mobility.  LR 02/05/18 0941 Done   Family Acceptance E,JOS BRANHAM,NR Educated on progression of POC, goals. Benefits of mobility. LR 02/05/18 0941 Done               Point: Precautions (Done)    Learning Progress Summary    Learner Readiness Method Response Comment Documented by Status   Patient Acceptance E,D YONAS,NR Educated on progression of POC, goals. Benefits of mobility. LR 02/05/18 0941 Done   Family Acceptance E,JOS BRANHAM,NR Educated on progression of POC, goals. Benefits of mobility. LR 02/05/18 0941 Done                      User Key     Initials Effective Dates Name Provider Type Discipline    LR 06/19/15 -  Kari Diaz, PT Physical Therapist PT                PT Recommendation and Plan  Anticipated Equipment Needs At Discharge:  (none)  Anticipated Discharge Disposition: home with assist, home with home health, other (see comments) (depending on progress, may need rehab)  Planned Therapy Interventions: balance training, bed mobility training, home exercise program, patient/family education, ROM (Range of Motion), strengthening, transfer training, wheelchair management/propulsion training  PT Frequency: daily  Plan of Care Review  Plan Of Care Reviewed With: patient, daughter  Progress: progress towards functional goals is fair  Outcome Summary/Follow up Plan: Patient able to stand pivot t/f from bed to chair with max assist. Patient unable to indicate if the transfer was similar to how much difficulty she has at baseline with t/f. Patient with limited mobility at baseline. Recommend d/c home with family and HHPT if patient can progress to only requiring min assist with t/f. If not, will need rehab prior to d/c home as she would a significant physical burden on her family if she was to d/c home at her current status. Will continue to progress as able.           IP PT Goals       02/05/18 0845          Bed Mobility PT LTG    Bed Mobility PT LTG, Date Established 02/05/18  -LR      Bed Mobility PT LTG, Time to Achieve 5  days  -LR      Bed Mobility PT LTG, Activity Type supine to sit/sit to supine  -LR      Bed Mobility PT LTG, [place] Level minimum assist (75% patient effort)  -LR      Bed Mobility PT LTG, Date Goal Reviewed 02/05/18  -LR      Bed Mobility PT LTG, Outcome goal ongoing  -LR      Transfer Training PT LTG    Transfer Training PT LTG, Date Established 02/05/18  -LR      Transfer Training PT LTG, Time to Achieve 5 days  -LR      Transfer Training PT LTG, Activity Type bed to chair /chair to bed  -LR      Transfer Training PT LTG, [place] Level minimum assist (75% patient effort)  -LR      Transfer Training PT LTG, Assist Device other (see comments)   appropriate AD  -LR      Transfer Training PT  LTG, Date Goal Reviewed 02/05/18  -LR      Transfer Training PT LTG, Outcome goal ongoing  -LR      Transfer Training 2 PT LTG    Transfer Training PT 2 LTG, Date Established 02/05/18  -LR      Transfer Training PT 2 LTG, Time to Achieve 5 days  -LR      Transfer Training PT 2 LTG, Activity Type sit to stand/stand to sit  -LR      Transfer Training PT 2 LTG, [place] Level minimum assist (75% patient effort)  -LR      Transfer Training PT 2 LTG, Assist Device other (see comments)   appropriate AD  -LR      Transfer Training PT 2 LTG, Date Goal Reviewed 02/05/18  -LR      Transfer Training PT 2 LTG, Outcome goal ongoing  -LR      Wheelchair Propulsion PT LTG    Wheelchair Propulsion Goal PT LTG, Date Established 02/05/18  -LR      Wheelchair Propulsion Goal PT LTG, Time to Achieve 5 days  -LR      Wheelchair Propulsion Goal PT LTG, [place] Level supervision required  -LR      Wheelchair Propulsion Goal PT LTG, Distance to Achieve 50 feet  -LR      Wheelchair Propulsion Goal PT LTG, Date Goal Reviewed 02/05/18  -LR      Wheelchair Propulsion Goal PT LTG, Outcome goal ongoing  -LR        User Key  (r) = Recorded By, (t) = Taken By, (c) = Cosigned By    Initials Name Provider Type    LR Kari Diaz, PT  Physical Therapist                Outcome Measures       02/05/18 0845          How much help from another person do you currently need...    Turning from your back to your side while in flat bed without using bedrails? 2  -LR      Moving from lying on back to sitting on the side of a flat bed without bedrails? 2  -LR      Moving to and from a bed to a chair (including a wheelchair)? 2  -LR      Standing up from a chair using your arms (e.g., wheelchair, bedside chair)? 2  -LR      Climbing 3-5 steps with a railing? 1  -LR      To walk in hospital room? 1  -LR      AM-PAC 6 Clicks Score 10  -LR      Functional Assessment    Outcome Measure Options AM-PAC 6 Clicks Basic Mobility (PT)  -LR        User Key  (r) = Recorded By, (t) = Taken By, (c) = Cosigned By    Initials Name Provider Type    LR Kari Diaz PT Physical Therapist           Time Calculation:         PT Charges       02/05/18 0943          Time Calculation    Start Time 0845  -LR      PT Received On 02/05/18  -LR      PT Goal Re-Cert Due Date 02/15/18  -LR      Time Calculation- PT    Total Timed Code Minutes- PT 15 minute(s)  -LR        User Key  (r) = Recorded By, (t) = Taken By, (c) = Cosigned By    Initials Name Provider Type    LR Kari Diaz PT Physical Therapist          Therapy Charges for Today     Code Description Service Date Service Provider Modifiers Qty    03495516032 HC PT MOBILITY CURRENT 2/5/2018 Kari Diaz, PT GP, CL 1    99125714873 HC PT MOBILITY PROJECTED 2/5/2018 Kari Diaz, PT GP, CK 1    62920258355 HC PT THER PROC EA 15 MIN 2/5/2018 Kari Diaz, PT GP 1    53316958949 HC PT THER SUPP EA 15 MIN 2/5/2018 Kari Diaz, PT GP 2    08744372772 HC PT EVAL MOD COMPLEXITY 3 2/5/2018 Kari Diaz, PT GP 1          PT G-Codes  PT Professional Judgement Used?: Yes  Outcome Measure Options: AM-PAC 6 Clicks Daily Activity (OT)  Score: 10  Functional Limitation: Mobility:  Walking and moving around  Mobility: Walking and Moving Around Current Status (): At least 60 percent but less than 80 percent impaired, limited or restricted  Mobility: Walking and Moving Around Goal Status (): At least 40 percent but less than 60 percent impaired, limited or restricted      Kari Diaz, PT  2/5/2018

## 2018-02-06 LAB
DEPRECATED RDW RBC AUTO: 52 FL (ref 37–54)
ERYTHROCYTE [DISTWIDTH] IN BLOOD BY AUTOMATED COUNT: 15.1 % (ref 11.3–14.5)
GLUCOSE BLDC GLUCOMTR-MCNC: 114 MG/DL (ref 70–130)
GLUCOSE BLDC GLUCOMTR-MCNC: 117 MG/DL (ref 70–130)
GLUCOSE BLDC GLUCOMTR-MCNC: 164 MG/DL (ref 70–130)
GLUCOSE BLDC GLUCOMTR-MCNC: 262 MG/DL (ref 70–130)
HCT VFR BLD AUTO: 32.9 % (ref 34.5–44)
HGB BLD-MCNC: 10.5 G/DL (ref 11.5–15.5)
INR PPP: 1.33
MCH RBC QN AUTO: 30.3 PG (ref 27–31)
MCHC RBC AUTO-ENTMCNC: 31.9 G/DL (ref 32–36)
MCV RBC AUTO: 95.1 FL (ref 80–99)
PLATELET # BLD AUTO: 335 10*3/MM3 (ref 150–450)
PMV BLD AUTO: 9.4 FL (ref 6–12)
PROTHROMBIN TIME: 14.6 SECONDS (ref 9.6–11.5)
RBC # BLD AUTO: 3.46 10*6/MM3 (ref 3.89–5.14)
WBC NRBC COR # BLD: 9.89 10*3/MM3 (ref 3.5–10.8)

## 2018-02-06 PROCEDURE — 97110 THERAPEUTIC EXERCISES: CPT

## 2018-02-06 PROCEDURE — G0378 HOSPITAL OBSERVATION PER HR: HCPCS

## 2018-02-06 PROCEDURE — 85027 COMPLETE CBC AUTOMATED: CPT | Performed by: PHYSICIAN ASSISTANT

## 2018-02-06 PROCEDURE — 82962 GLUCOSE BLOOD TEST: CPT

## 2018-02-06 PROCEDURE — 99231 SBSQ HOSP IP/OBS SF/LOW 25: CPT | Performed by: PHYSICIAN ASSISTANT

## 2018-02-06 PROCEDURE — 85610 PROTHROMBIN TIME: CPT

## 2018-02-06 RX ORDER — SENNA AND DOCUSATE SODIUM 50; 8.6 MG/1; MG/1
2 TABLET, FILM COATED ORAL NIGHTLY
Status: DISCONTINUED | OUTPATIENT
Start: 2018-02-06 | End: 2018-02-07

## 2018-02-06 RX ORDER — WARFARIN SODIUM 2 MG/1
2 TABLET ORAL
Status: COMPLETED | OUTPATIENT
Start: 2018-02-06 | End: 2018-02-06

## 2018-02-06 RX ADMIN — CASTOR OIL AND BALSAM, PERU: 788; 87 OINTMENT TOPICAL at 20:45

## 2018-02-06 RX ADMIN — MULTIPLE VITAMINS W/ MINERALS TAB 1 TABLET: TAB ORAL at 08:49

## 2018-02-06 RX ADMIN — LOSARTAN POTASSIUM AND HYDROCHLOROTHIAZIDE 2 TABLET: 12.5; 5 TABLET ORAL at 08:51

## 2018-02-06 RX ADMIN — WARFARIN SODIUM 2 MG: 2 TABLET ORAL at 17:30

## 2018-02-06 RX ADMIN — INSULIN LISPRO 4 UNITS: 100 INJECTION, SOLUTION INTRAVENOUS; SUBCUTANEOUS at 12:16

## 2018-02-06 RX ADMIN — WARFARIN SODIUM 4 MG: 4 TABLET ORAL at 17:31

## 2018-02-06 RX ADMIN — Medication 2 TABLET: at 20:44

## 2018-02-06 RX ADMIN — ATORVASTATIN CALCIUM 40 MG: 40 TABLET, FILM COATED ORAL at 08:49

## 2018-02-06 RX ADMIN — POLYETHYLENE GLYCOL 3350 17 G: 17 POWDER, FOR SOLUTION ORAL at 20:44

## 2018-02-06 RX ADMIN — CASTOR OIL AND BALSAM, PERU: 788; 87 OINTMENT TOPICAL at 08:49

## 2018-02-06 RX ADMIN — DIGOXIN 250 MCG: 250 TABLET ORAL at 08:49

## 2018-02-06 RX ADMIN — LACTULOSE 10 G: 20 SOLUTION ORAL at 08:49

## 2018-02-06 RX ADMIN — HYDROCODONE BITARTATE AND ACETAMINOPHEN 1 TABLET: 5; 325 TABLET ORAL at 17:30

## 2018-02-06 RX ADMIN — HYDROCODONE BITARTATE AND ACETAMINOPHEN 1 TABLET: 5; 325 TABLET ORAL at 14:19

## 2018-02-06 RX ADMIN — HYDROCODONE BITARTATE AND ACETAMINOPHEN 1 TABLET: 5; 325 TABLET ORAL at 10:35

## 2018-02-06 RX ADMIN — METOPROLOL SUCCINATE 25 MG: 25 TABLET, EXTENDED RELEASE ORAL at 08:49

## 2018-02-06 RX ADMIN — INSULIN LISPRO 2 UNITS: 100 INJECTION, SOLUTION INTRAVENOUS; SUBCUTANEOUS at 20:47

## 2018-02-06 RX ADMIN — HYDROCODONE BITARTATE AND ACETAMINOPHEN 1 TABLET: 5; 325 TABLET ORAL at 22:03

## 2018-02-06 RX ADMIN — HYDROCODONE BITARTATE AND ACETAMINOPHEN 1 TABLET: 5; 325 TABLET ORAL at 05:35

## 2018-02-06 RX ADMIN — HYDROCODONE BITARTATE AND ACETAMINOPHEN 1 TABLET: 5; 325 TABLET ORAL at 02:00

## 2018-02-06 RX ADMIN — TRAZODONE HYDROCHLORIDE 50 MG: 50 TABLET ORAL at 20:44

## 2018-02-06 NOTE — PROGRESS NOTES
"    [facility] Medicine Services  PROGRESS NOTE    Patient Name: Kristin Magdaleno  : 1935  MRN: [id number]    Date of Admission: 2018  Length of Stay: 0  Primary Care Physician: Maryann Guillaume MD    Subjective     CC: follow up L rib fractures    HPI:  Up in chair, daughter at bedside. Her pain is controlled with scheduled Norco. \"Twinges\" of pain, worse with movement and deep breathing. Bowels haven't moved since prior to admission. Awaiting chart review from [facility] for possible rehab bed.     Review of Systems  Gen- No fevers, chills  CV- No chest pain, palpitations  Resp- No cough, dyspnea  GI- No N/V/D, abd pain  Musk- L rib pain, worse with inspiration/movement    Otherwise ROS is negative except as mentioned in the HPI.    Objective     Vital Signs:   Temp:  [98.3 °F (36.8 °C)-98.9 °F (37.2 °C)] 98.5 °F (36.9 °C)  Heart Rate:  [] 90  Resp:  [14-18] 16  BP: (113-163)/(51-67) 113/51        Physical Exam:  Constitutional: Thin, elderly female. NAD. Awake, alert, pleasant and conversant.   HENT: NCAT, mucous membranes moist  Respiratory: Clear to auscultation bilaterally, respiratory effort normal   Cardiovascular: RRR, no murmurs, rubs, or gallops, palpable pedal pulses bilaterally  Gastrointestinal: Positive bowel sounds, soft, nontender, nondistended  Musculoskeletal: No bilateral ankle edema  Psychiatric: Appropriate affect, cooperative  Neurologic: Oriented x 3, strength symmetric in all extremities, Cranial Nerves grossly intact to confrontation, speech clear  Skin: No rashes      Results Reviewed:  I have personally reviewed current lab, radiology, and data and agree.      Results from last 7 days  Lab Units 18  0737 18  0601 18  1138   WBC 10*3/mm3 9.89 9.71 13.90*   HEMOGLOBIN g/dL 10.5* 10.0* 11.3*   HEMATOCRIT % 32.9* 31.8* 37.2   PLATELETS 10*3/mm3 335 339 377   INR  1.33 1.86 2.49       Results from last 7 days  Lab Units 18  0601 " 02/04/18  1138   SODIUM mmol/L 138 143   POTASSIUM mmol/L 4.0 4.2   CHLORIDE mmol/L 104 107   CO2 mmol/L 31.0 31.0   BUN mg/dL 16 24*   CREATININE mg/dL 0.60 0.80   GLUCOSE mg/dL 91 114*   CALCIUM mg/dL 8.4* 9.0   ALT (SGPT) U/L  --  15   AST (SGOT) U/L  --  18     Estimated Creatinine Clearance: 48 mL/min (by C-G formula based on Cr of 0.6).  No results found for: BNP  No results found for: PHART    Microbiology Results Abnormal     None        Imaging Results (last 24 hours)     ** No results found for the last 24 hours. **        I have reviewed the medications.    Assessment / Plan     Hospital Problem List     * (Principal)Multiple closed fractures of ribs of left side    Essential hypertension, benign    Pure hypercholesterolemia    Diabetes mellitus without complication    Overview Signed 7/20/2016  7:09 PM by Vick Diaz     Type 2 or unspecified, not stated as uncontrolled.         CVA (cerebral vascular accident)    Overview Signed 2/4/2018  3:38 PM by JAY Ellison     With Rt hemiparesis x 20+ years         Atrial fibrillation    Rheumatic heart disease    Overview Signed 9/7/2017  8:29 AM by Maryann Guillaume MD     as a child, heart murmur, no recent echos (pt. declines)         Heart murmur (Chronic)    Overview Signed 2/4/2018  3:35 PM by JAY Ellison     RHF as child.  no recent echos (pt declines)         Subcutaneous emphysema    Overview Signed 2/4/2018  3:36 PM by JAY Ellisno     Acute s/p fall         Contusion of left lower extremity    Renal cyst        Brief Hospital Course to date:  Kristin Magdaleno is a 83 y.o. 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 She presented to [facility] ED on 2/4/18 after a fall at home, sustaining multiple closed L rib fractures with development of subcutaneous edema.     9th and 10th L rib fractures  LLE  contusion  Fall at home  - Continue PT/OT, case management following for possible rehab options at discharge  - Pain control PRN  - Incentive spirometry - teaching and encouragement. High risk of developing subsequent pneumonia   - No LLE fracture on radiology, mild medial ankle effusion appears stable     Trace intrapleural air and subcutaneous emphysema  - No evidence of pneumothorax or pleural effusion  - Continue to monitor  - 2/6 CXR appears stable, some increased markings along L lung base    Non-insulin dependent DMII  - Continue accuchecks and SSI  - Using little to no insulin  - Hgb A1c 5.9%     Chronic coccygeal decubitus ulcer  - WOC to evaluate     History of CAD s/p CABG   Intraoperative atrial fibrillation/CVA with residual R hemiparesis  - Maintained on Coumadin therapy  - Coumadin held upon admission due to fall with contusions  - Hemoglobin appears stable   - CHADS VASC = 7 (11% annual risk of stroke) - HAS BLED = 2 (4.1% annual risk of bleeding)   - Discussed with patient and , they would like to resume Coumadin.   - Pharmacy dosing. AM INR and CBC, no signs of blood loss    Hypothyroidism   - TSH elevated, however T4 normal  - Patient/family previously opted to stop Synthroid with guidance from PCP     Hypertension, continue home meds  Hyperlipidemia, statin     DVT Prophylaxis:  Mechanical  CODE STATUS: Full Code    Disposition: I expect the patient to be discharged home vs SNF tomorrow    Suni Barrios PA-C  02/06/18  3:47 PM

## 2018-02-06 NOTE — THERAPY TREATMENT NOTE
Acute Care - Physical Therapy Treatment Note  [facility]     Patient Name: Kristin Magdaleno  : 1935  MRN: [id number]  Today's Date: 2018  Onset of Illness/Injury or Date of Surgery Date: 18  Date of Referral to PT: 18  Referring Physician: JAY Wilcox    Admit Date: 2018    Visit Dx:    ICD-10-CM ICD-9-CM   1. Closed fracture of multiple ribs of left side, initial encounter S22.42XA 807.09   2. Contusion of left lower extremity, initial encounter S80.12XA 924.5   3. Fall, initial encounter W19.XXXA E888.9   4. Subcutaneous emphysema, initial encounter T79.7XXA 958.7   5. Impaired functional mobility, balance, gait, and endurance Z74.09 V49.89     Patient Active Problem List   Diagnosis   • Essential hypertension, benign   • Pure hypercholesterolemia   • Diabetes mellitus without complication   • Coronary atherosclerosis   • CVA (cerebral vascular accident)   • Atrial fibrillation   • Rheumatic heart disease   • Multiple closed fractures of ribs of left side   • Heart murmur   • Subcutaneous emphysema   • Contusion of left lower extremity   • Renal cyst               Adult Rehabilitation Note       18 0930          Rehab Assessment/Intervention    Discipline physical therapy assistant  -UD      Document Type therapy note (daily note)  -UD      Subjective Information agree to therapy;complains of;weakness;pain  -UD      Patient Effort, Rehab Treatment adequate  -UD      Symptoms Noted During/After Treatment increased pain  -UD      Precautions/Limitations fall precautions  -UD      Specific Treatment Considerations --   old cva rt,brace rt leg  -UD      Recorded by [UD] Bri Potter PTA      Vital Signs    O2 Delivery Post Treatment room air  -UD      Pre Patient Position Supine  -UD      Intra Patient Position Standing  -UD      Post Patient Position Sitting  -UD      Recorded by [UD] Bri Potter PTA      Pain Assessment    Pain Assessment Sanchez-Bustillo FACES  -UD       Sanchez-Baker FACES Pain Rating 6  -UD      Pain Type Acute pain  -UD      Pain Location Chest  -UD      Pain Orientation Left  -UD      Recorded by [UD] Bri Ptoter PTA      Cognitive Assessment/Intervention    Orientation Status oriented to;person;situation  -UD      Personal Safety Interventions fall prevention program maintained  -UD      Recorded by [UD] Bri Potter PTA      Bed Mobility, Assessment/Treatment    Bed Mob, Supine to Sit, [place] maximum assist (25% patient effort);2 person assist required  -UD      Recorded by [UD] Bri Potter PTA      Transfer Assessment/Treatment    Transfers, Bed-Chair [place] maximum assist (25% patient effort);2 person assist required  -UD      Transfers, Sit-Stand [place] maximum assist (25% patient effort);2 person assist required  -UD      Transfers, Stand-Sit [place] maximum assist (25% patient effort);2 person assist required  -UD      Recorded by [UD] Bri Potter PTA      Gait Assessment/Treatment    Gait, [place] Level other (see comments)   pivot to chair  -UD      Recorded by [UD] Bri Potter PTA      Positioning and Restraints    Pre-Treatment Position in bed  -UD      Post Treatment Position chair  -UD      In Chair notified nsg;reclined;sitting;call light within reach;exit alarm on;with family/caregiver;legs elevated  -UD      Recorded by [UD] Bri Potter PTA        User Key  (r) = Recorded By, (t) = Taken By, (c) = Cosigned By    Initials Name Effective Dates    PANTERA Potter PTA 06/22/15 -                 IP PT Goals       02/06/18 1017 02/05/18 0845       Bed Mobility PT LTG    Bed Mobility PT LTG, Date Established  02/05/18  -LR     Bed Mobility PT LTG, Time to Achieve  5 days  -LR     Bed Mobility PT LTG, Activity Type  supine to sit/sit to supine  -LR     Bed Mobility PT LTG, [place] Level  minimum assist (75% patient effort)  -LR     Bed Mobility PT LTG, Date Goal Reviewed  02/05/18  -LR     Bed Mobility PT LTG,  Outcome goal ongoing  -UD goal ongoing  -LR     Transfer Training PT LTG    Transfer Training PT LTG, Date Established  02/05/18  -LR     Transfer Training PT LTG, Time to Achieve  5 days  -LR     Transfer Training PT LTG, Activity Type  bed to chair /chair to bed  -LR     Transfer Training PT LTG, [place] Level  minimum assist (75% patient effort)  -LR     Transfer Training PT LTG, Assist Device  other (see comments)   appropriate AD  -LR     Transfer Training PT  LTG, Date Goal Reviewed  02/05/18  -LR     Transfer Training PT LTG, Outcome goal ongoing  -UD goal ongoing  -LR     Transfer Training 2 PT LTG    Transfer Training PT 2 LTG, Date Established  02/05/18  -LR     Transfer Training PT 2 LTG, Time to Achieve  5 days  -LR     Transfer Training PT 2 LTG, Activity Type  sit to stand/stand to sit  -LR     Transfer Training PT 2 LTG, [place] Level  minimum assist (75% patient effort)  -LR     Transfer Training PT 2 LTG, Assist Device  other (see comments)   appropriate AD  -LR     Transfer Training PT 2 LTG, Date Goal Reviewed  02/05/18  -LR     Transfer Training PT 2 LTG, Outcome goal ongoing  -UD goal ongoing  -LR     Wheelchair Propulsion PT LTG    Wheelchair Propulsion Goal PT LTG, Date Established  02/05/18  -LR     Wheelchair Propulsion Goal PT LTG, Time to Achieve  5 days  -LR     Wheelchair Propulsion Goal PT LTG, [place] Level  supervision required  -LR     Wheelchair Propulsion Goal PT LTG, Distance to Achieve  50 feet  -LR     Wheelchair Propulsion Goal PT LTG, Date Goal Reviewed  02/05/18  -LR     Wheelchair Propulsion Goal PT LTG, Outcome goal ongoing  -UD goal ongoing  -LR       User Key  (r) = Recorded By, (t) = Taken By, (c) = Cosigned By    Initials Name Provider Type    UD Bri Potter, PTA Physical Therapy Assistant    LR Kari Diaz, PT Physical Therapist          Physical Therapy Education     Title: PT OT SLP Therapies (Done)     Topic: Physical Therapy (Done)      Point: Mobility training (Done)    Learning Progress Summary    Learner Readiness Method Response Comment Documented by Status   Patient Acceptance E,D DU,NR  UD 02/06/18 1017 Done    Acceptance E VU  EM 02/06/18 0413 Done    Acceptance E,D VU,NR Educated on progression of POC, goals. Benefits of mobility.  02/05/18 0941 Done   Family Acceptance E,D DU,NR  UD 02/06/18 1017 Done    Acceptance E,D VU,NR Educated on progression of POC, goals. Benefits of mobility.  02/05/18 0941 Done               Point: Home exercise program (Done)    Learning Progress Summary    Learner Readiness Method Response Comment Documented by Status   Patient Acceptance E,D DU,NR  UD 02/06/18 1017 Done    Acceptance E VU  EM 02/06/18 0413 Done    Acceptance E,D VU,NR Educated on progression of POC, goals. Benefits of mobility.  02/05/18 0941 Done   Family Acceptance E,D DU,NR  UD 02/06/18 1017 Done    Acceptance E,D VU,NR Educated on progression of POC, goals. Benefits of mobility.  02/05/18 0941 Done               Point: Body mechanics (Done)    Learning Progress Summary    Learner Readiness Method Response Comment Documented by Status   Patient Acceptance E,D DU,NR  UD 02/06/18 1017 Done    Acceptance E VU  EM 02/06/18 0413 Done    Acceptance E,D VU,NR Educated on progression of POC, goals. Benefits of mobility.  02/05/18 0941 Done   Family Acceptance E,D DU,NR  UD 02/06/18 1017 Done    Acceptance E,D VU,NR Educated on progression of POC, goals. Benefits of mobility.  02/05/18 0941 Done               Point: Precautions (Done)    Learning Progress Summary    Learner Readiness Method Response Comment Documented by Status   Patient Acceptance E,D DU,NR  UD 02/06/18 1017 Done    Acceptance E VU  EM 02/06/18 0413 Done    Acceptance E,D VU,NR Educated on progression of POC, goals. Benefits of mobility.  02/05/18 0941 Done   Family Acceptance E,D DU,NR  UD 02/06/18 1017 Done    Acceptance E,D VU,NR Educated on progression of POC,  goals. Benefits of mobility. LR 02/05/18 0941 Done                      User Key     Initials Effective Dates Name Provider Type Discipline    UD 06/22/15 -  Bri Potter PTA Physical Therapy Assistant PT    LR 06/19/15 -  Kari Diaz, PT Physical Therapist PT    EM 06/12/17 -  Cammie Ruiz, RN Registered Nurse Nurse                    PT Recommendation and Plan  Anticipated Equipment Needs At Discharge:  (none)  Anticipated Discharge Disposition: home with assist, home with home health, other (see comments) (depending on progress, may need rehab)  Planned Therapy Interventions: balance training, bed mobility training, home exercise program, patient/family education, ROM (Range of Motion), strengthening, transfer training, wheelchair management/propulsion training  PT Frequency: daily  Plan of Care Review  Plan Of Care Reviewed With: patient, family  Progress: no change  Outcome Summary/Follow up Plan: pt limited by pain lt chest and old cva rt.needs max assist for transfers and pivot transfer,did some exercises in chair          Outcome Measures       02/06/18 0930 02/05/18 0845       How much help from another person do you currently need...    Turning from your back to your side while in flat bed without using bedrails? 1  -UD 2  -LR     Moving from lying on back to sitting on the side of a flat bed without bedrails? 2  -UD 2  -LR     Moving to and from a bed to a chair (including a wheelchair)? 2  -UD 2  -LR     Standing up from a chair using your arms (e.g., wheelchair, bedside chair)? 2  -UD 2  -LR     Climbing 3-5 steps with a railing? 1  -UD 1  -LR     To walk in hospital room? 1  -UD 1  -LR     AM-PAC 6 Clicks Score 9  -UD 10  -LR     Functional Assessment    Outcome Measure Options  AM-PAC 6 Clicks Basic Mobility (PT)  -LR       User Key  (r) = Recorded By, (t) = Taken By, (c) = Cosigned By    Initials Name Provider Type    UD Bri Potter PTA Physical Therapy Assistant    LR Kari Antoine  Emily, PT Physical Therapist           Time Calculation:         PT Charges       02/06/18 1019          Time Calculation    PT Received On 02/06/18  -UD      PT Goal Re-Cert Due Date 02/15/18  -      Time Calculation- PT    Total Timed Code Minutes- PT 20 minute(s)  -UD        User Key  (r) = Recorded By, (t) = Taken By, (c) = Cosigned By    Initials Name Provider Type    UD Bri Potter PTA Physical Therapy Assistant          Therapy Charges for Today     Code Description Service Date Service Provider Modifiers Qty    46330847198 HC PT THER PROC EA 15 MIN 2/6/2018 Bri Potter PTA GP 1    56214367142 HC PT THER SUPP EA 15 MIN 2/6/2018 Bri Potter PTA GP 1          PT G-Codes  PT Professional Judgement Used?: Yes  Outcome Measure Options: AM-PAC 6 Clicks Daily Activity (OT)  Score: 10  Functional Limitation: Mobility: Walking and moving around  Mobility: Walking and Moving Around Current Status (): At least 60 percent but less than 80 percent impaired, limited or restricted  Mobility: Walking and Moving Around Goal Status (): At least 40 percent but less than 60 percent impaired, limited or restricted    Bri Potter PTA  2/6/2018

## 2018-02-06 NOTE — PLAN OF CARE
Problem: Patient Care Overview (Adult)  Goal: Plan of Care Review  Outcome: Ongoing (interventions implemented as appropriate)   02/05/18 1400 02/05/18 2000 02/06/18 0348   Coping/Psychosocial Response Interventions   Plan Of Care Reviewed With --  patient;family --    Patient Care Overview   Progress --  --  progress toward functional goals as expected   Outcome Evaluation   Outcome Summary/Follow up Plan Patient consult for possible pressure ulcer on right gluteal (present on admission). Patient and family report area has been present for many years. Presents as Stage 3 -edges calloused-wound bed clean. Venelex ordered. Patient on waffle mattress and reports comfort with bed-prefers to sit in chair. All skin interventions in place and implemented. No other concerns at this time-WOCN will sign off. Please notify for any questions or concerns.  --  --      Goal: Adult Individualization and Mutuality  Outcome: Ongoing (interventions implemented as appropriate)    Goal: Discharge Needs Assessment  Outcome: Ongoing (interventions implemented as appropriate)      Problem: Fall Risk (Adult)  Goal: Identify Related Risk Factors and Signs and Symptoms  Outcome: Ongoing (interventions implemented as appropriate)   02/04/18 1519   Fall Risk   Fall Risk: Related Risk Factors age-related changes;history of falls  (h/o CVA)   Fall Risk: Signs and Symptoms presence of risk factors     Goal: Absence of Falls  Outcome: Ongoing (interventions implemented as appropriate)   02/04/18 1519   Fall Risk (Adult)   Absence of Falls making progress toward outcome       Problem: Pressure Ulcer Risk (Grey Scale) (Adult,Obstetrics,Pediatric)  Goal: Identify Related Risk Factors and Signs and Symptoms  Outcome: Ongoing (interventions implemented as appropriate)   02/05/18 1400   Pressure Ulcer Risk (Grey Scale)   Related Risk Factors (Pressure Ulcer Risk (Grey Scale)) age extremes;mobility impaired     Goal: Skin Integrity  Outcome:  Ongoing (interventions implemented as appropriate)   02/06/18 6978   Pressure Ulcer Risk (Grey Scale) (Adult,Obstetrics,Pediatric)   Skin Integrity making progress toward outcome

## 2018-02-06 NOTE — PROGRESS NOTES
"Pharmacy Consult  -  Warfarin    Kristin Magdaleno is an 82 yo woman admitted 2/4/18 after a fall at home, sustaining multiple closed L rib fractures with development of subcutaneous edema. PMH significant for HTN, hyperlipidemia, non-insulin dependent DMII and CAD s/p CABG with intraoperative atrial fibrillation/CVA with residual R hemiparesis.  She is maintained on warfarin therapy, which was held upon admission due to fall with contusions.  CHADS VASC = 7 (11% annual risk of stroke) - HAS BLED = 2 (4.1% annual risk of bleeding).  Provider notes that risks of anticoagulation discussed with patient and , they would like to resume warfarin. Pharmacy to dose while in house.     Height - 177.8 cm (70\")  Weight - 55.1 kg (121 lb 8 oz)     Consulting Provider: - ISAEL Barrios  Indication: - Afib  Goal INR:  2-3  Home Regimen:                        - Warfarin 4 mg po on Mon, Tues, Wed, Thurs, Fri and Sat                        - Warfarin 3 mg po on Sundays only     Bridge Therapy: No     Drug-Drug Interactions with current regimen: None at present     Warfarin Dosing During Admission:     Date  2/5 2/6                 INR  1.86  1.33                 Dose  4 mg                      Education Provided:  Pending     Labs:    Results from last 7 days     Lab Units 02/06/18  0737 02/05/18  0601 02/04/18  1138 01/31/18   INR  1.33 1.86 2.49 2.30   HEMOGLOBIN g/dL 10.5* 10.0* 11.3* --    HEMATOCRIT % 32.9* 31.8* 37.2 --    PLATELETS 10*3/mm3 [phone] --    Current dietary intake: Regular diet with Boost HP. Consumed 50% of meals yesterday, no data yet for today.    Assessment/Plan:     No clear reason why INR is falling, as it appears that she probably missed only one dose.  Will give an extra 2 mg today for a 50% increase and resume the home dose as she was therapeutic on admission.  Pharmacy Service will continue to follow the patient's clinical progress and monitor for evidence of adverse effects caused by " warfarin therapy.    Nighat Lorenzo, PharmD, BCPS

## 2018-02-06 NOTE — PLAN OF CARE
Problem: Patient Care Overview (Adult)  Goal: Plan of Care Review  Outcome: Ongoing (interventions implemented as appropriate)   02/06/18 1017   Coping/Psychosocial Response Interventions   Plan Of Care Reviewed With patient;family   Patient Care Overview   Progress no change   Outcome Evaluation   Outcome Summary/Follow up Plan pt limited by pain lt chest and old cva rt.needs max assist for transfers and pivot transfer,did some exercises in chair       Problem: Inpatient Physical Therapy  Goal: Bed Mobility Goal LTG- PT  Outcome: Ongoing (interventions implemented as appropriate)   02/05/18 0845 02/06/18 1017   Bed Mobility PT LTG   Bed Mobility PT LTG, Date Established 02/05/18 --    Bed Mobility PT LTG, Time to Achieve 5 days --    Bed Mobility PT LTG, Activity Type supine to sit/sit to supine --    Bed Mobility PT LTG, [place] Level minimum assist (75% patient effort) --    Bed Mobility PT LTG, Date Goal Reviewed 02/05/18 --    Bed Mobility PT LTG, Outcome --  goal ongoing     Goal: Transfer Training Goal 1 LTG- PT  Outcome: Ongoing (interventions implemented as appropriate)   02/05/18 0845 02/06/18 1017   Transfer Training PT LTG   Transfer Training PT LTG, Date Established 02/05/18 --    Transfer Training PT LTG, Time to Achieve 5 days --    Transfer Training PT LTG, Activity Type bed to chair /chair to bed --    Transfer Training PT LTG, [place] Level minimum assist (75% patient effort) --    Transfer Training PT LTG, Assist Device other (see comments)  (appropriate AD) --    Transfer Training PT LTG, Date Goal Reviewed 02/05/18 --    Transfer Training PT LTG, Outcome --  goal ongoing     Goal: Transfer Training Goal 2 LTG- PT  Outcome: Ongoing (interventions implemented as appropriate)   02/05/18 0845 02/06/18 1017   Transfer Training 2 PT LTG   Transfer Training PT 2 LTG, Date Established 02/05/18 --    Transfer Training PT 2 LTG, Time to Achieve 5 days --    Transfer Training PT 2 LTG, Activity  Type sit to stand/stand to sit --    Transfer Training PT 2 LTG, [place] Level minimum assist (75% patient effort) --    Transfer Training PT 2 LTG, Assist Device other (see comments)  (appropriate AD) --    Transfer Training PT 2 LTG, Date Goal Reviewed 02/05/18 --    Transfer Training PT 2 LTG, Outcome --  goal ongoing     Goal: Wheelchair Propulsion Goal LTG- PT  Outcome: Ongoing (interventions implemented as appropriate)   02/05/18 0845 02/06/18 1017   Wheelchair Propulsion PT LTG   Wheelchair Propulsion Goal PT LTG, Date Established 02/05/18 --    Wheelchair Propulsion Goal PT LTG, Time to Achieve 5 days --    Wheelchair Propulsion Goal PT LTG, [place] Level supervision required --    Wheelchair Propulsion Goal PT LTG, Distance to Achieve 50 feet --    Wheelchair Propulsion Goal PT LTG, Date Goal Reviewed 02/05/18 --    Wheelchair Propulsion Goal PT LTG, Outcome --  goal ongoing

## 2018-02-06 NOTE — PLAN OF CARE
Problem: Patient Care Overview (Adult)  Goal: Plan of Care Review  Outcome: Ongoing (interventions implemented as appropriate)    Goal: Discharge Needs Assessment  Outcome: Ongoing (interventions implemented as appropriate)      Problem: Fall Risk (Adult)  Goal: Identify Related Risk Factors and Signs and Symptoms  Outcome: Ongoing (interventions implemented as appropriate)    Goal: Absence of Falls  Outcome: Ongoing (interventions implemented as appropriate)      Problem: Pressure Ulcer Risk (Grey Scale) (Adult,Obstetrics,Pediatric)  Goal: Identify Related Risk Factors and Signs and Symptoms  Outcome: Ongoing (interventions implemented as appropriate)    Goal: Skin Integrity  Outcome: Ongoing (interventions implemented as appropriate)

## 2018-02-07 LAB
GLUCOSE BLDC GLUCOMTR-MCNC: 116 MG/DL (ref 70–130)
GLUCOSE BLDC GLUCOMTR-MCNC: 126 MG/DL (ref 70–130)
GLUCOSE BLDC GLUCOMTR-MCNC: 172 MG/DL (ref 70–130)
GLUCOSE BLDC GLUCOMTR-MCNC: 197 MG/DL (ref 70–130)
INR PPP: 1.48
PROTHROMBIN TIME: 16.3 SECONDS (ref 9.6–11.5)

## 2018-02-07 PROCEDURE — 97110 THERAPEUTIC EXERCISES: CPT

## 2018-02-07 PROCEDURE — 82962 GLUCOSE BLOOD TEST: CPT

## 2018-02-07 PROCEDURE — 99232 SBSQ HOSP IP/OBS MODERATE 35: CPT | Performed by: INTERNAL MEDICINE

## 2018-02-07 PROCEDURE — 85610 PROTHROMBIN TIME: CPT

## 2018-02-07 RX ORDER — BISACODYL 5 MG/1
10 TABLET, DELAYED RELEASE ORAL ONCE
Status: COMPLETED | OUTPATIENT
Start: 2018-02-07 | End: 2018-02-07

## 2018-02-07 RX ORDER — METOPROLOL SUCCINATE 50 MG/1
50 TABLET, EXTENDED RELEASE ORAL DAILY
Status: DISCONTINUED | OUTPATIENT
Start: 2018-02-08 | End: 2018-02-10 | Stop reason: HOSPADM

## 2018-02-07 RX ORDER — METOPROLOL SUCCINATE 25 MG/1
25 TABLET, EXTENDED RELEASE ORAL ONCE
Status: COMPLETED | OUTPATIENT
Start: 2018-02-07 | End: 2018-02-07

## 2018-02-07 RX ORDER — WARFARIN SODIUM 2 MG/1
2 TABLET ORAL
Status: COMPLETED | OUTPATIENT
Start: 2018-02-07 | End: 2018-02-07

## 2018-02-07 RX ADMIN — WARFARIN SODIUM 4 MG: 4 TABLET ORAL at 17:20

## 2018-02-07 RX ADMIN — INSULIN LISPRO 2 UNITS: 100 INJECTION, SOLUTION INTRAVENOUS; SUBCUTANEOUS at 11:56

## 2018-02-07 RX ADMIN — POLYETHYLENE GLYCOL 3350 17 G: 17 POWDER, FOR SOLUTION ORAL at 08:55

## 2018-02-07 RX ADMIN — DIGOXIN 250 MCG: 250 TABLET ORAL at 08:55

## 2018-02-07 RX ADMIN — HYDROCODONE BITARTATE AND ACETAMINOPHEN 1 TABLET: 5; 325 TABLET ORAL at 05:51

## 2018-02-07 RX ADMIN — INSULIN LISPRO 2 UNITS: 100 INJECTION, SOLUTION INTRAVENOUS; SUBCUTANEOUS at 21:14

## 2018-02-07 RX ADMIN — CASTOR OIL AND BALSAM, PERU: 788; 87 OINTMENT TOPICAL at 21:13

## 2018-02-07 RX ADMIN — CASTOR OIL AND BALSAM, PERU: 788; 87 OINTMENT TOPICAL at 08:56

## 2018-02-07 RX ADMIN — HYDROCODONE BITARTATE AND ACETAMINOPHEN 1 TABLET: 5; 325 TABLET ORAL at 10:58

## 2018-02-07 RX ADMIN — HYDROCODONE BITARTATE AND ACETAMINOPHEN 1 TABLET: 5; 325 TABLET ORAL at 21:12

## 2018-02-07 RX ADMIN — METOPROLOL SUCCINATE 25 MG: 25 TABLET, EXTENDED RELEASE ORAL at 08:55

## 2018-02-07 RX ADMIN — MULTIPLE VITAMINS W/ MINERALS TAB 1 TABLET: TAB ORAL at 08:55

## 2018-02-07 RX ADMIN — METOPROLOL SUCCINATE 25 MG: 25 TABLET, EXTENDED RELEASE ORAL at 11:56

## 2018-02-07 RX ADMIN — TRAZODONE HYDROCHLORIDE 50 MG: 50 TABLET ORAL at 21:12

## 2018-02-07 RX ADMIN — POLYETHYLENE GLYCOL 3350 17 G: 17 POWDER, FOR SOLUTION ORAL at 21:12

## 2018-02-07 RX ADMIN — ATORVASTATIN CALCIUM 40 MG: 40 TABLET, FILM COATED ORAL at 08:55

## 2018-02-07 RX ADMIN — HYDROCODONE BITARTATE AND ACETAMINOPHEN 1 TABLET: 5; 325 TABLET ORAL at 14:44

## 2018-02-07 RX ADMIN — LOSARTAN POTASSIUM AND HYDROCHLOROTHIAZIDE 2 TABLET: 12.5; 5 TABLET ORAL at 08:55

## 2018-02-07 RX ADMIN — WARFARIN SODIUM 2 MG: 2 TABLET ORAL at 17:20

## 2018-02-07 RX ADMIN — HYDROCODONE BITARTATE AND ACETAMINOPHEN 1 TABLET: 5; 325 TABLET ORAL at 02:26

## 2018-02-07 RX ADMIN — HYDROCODONE BITARTATE AND ACETAMINOPHEN 1 TABLET: 5; 325 TABLET ORAL at 17:20

## 2018-02-07 RX ADMIN — BISACODYL 10 MG: 5 TABLET, COATED ORAL at 11:55

## 2018-02-07 NOTE — PLAN OF CARE
Problem: Patient Care Overview (Adult)  Goal: Plan of Care Review  Outcome: Ongoing (interventions implemented as appropriate)   02/07/18 1607   Coping/Psychosocial Response Interventions   Plan Of Care Reviewed With patient   Patient Care Overview   Progress progress toward functional goals as expected   Outcome Evaluation   Outcome Summary/Follow up Plan Pt STS x4 from chair with modA x2. Pt able to take 1 step forward and backward with LLE, requiring maxA x2. Pt limited by increased rib pain on L and fatigue. Continue to progress as appropriate.        Problem: Inpatient Physical Therapy  Goal: Bed Mobility Goal LTG- PT  Outcome: Ongoing (interventions implemented as appropriate)   02/05/18 0845 02/07/18 1607   Bed Mobility PT LTG   Bed Mobility PT LTG, Date Established 02/05/18 --    Bed Mobility PT LTG, Time to Achieve 5 days --    Bed Mobility PT LTG, Activity Type supine to sit/sit to supine --    Bed Mobility PT LTG, [place] Level minimum assist (75% patient effort) --    Bed Mobility PT LTG, Date Goal Reviewed 02/05/18 --    Bed Mobility PT LTG, Outcome --  goal ongoing     Goal: Transfer Training Goal 1 LTG- PT  Outcome: Ongoing (interventions implemented as appropriate)   02/05/18 0845 02/07/18 1607   Transfer Training PT LTG   Transfer Training PT LTG, Date Established 02/05/18 --    Transfer Training PT LTG, Time to Achieve 5 days --    Transfer Training PT LTG, Activity Type bed to chair /chair to bed --    Transfer Training PT LTG, [place] Level minimum assist (75% patient effort) --    Transfer Training PT LTG, Assist Device other (see comments)  (appropriate AD) --    Transfer Training PT LTG, Date Goal Reviewed 02/05/18 --    Transfer Training PT LTG, Outcome --  goal ongoing     Goal: Transfer Training Goal 2 LTG- PT  Outcome: Ongoing (interventions implemented as appropriate)   02/05/18 0845 02/07/18 1607   Transfer Training 2 PT LTG   Transfer Training PT 2 LTG, Date Established  02/05/18 --    Transfer Training PT 2 LTG, Time to Achieve 5 days --    Transfer Training PT 2 LTG, Activity Type sit to stand/stand to sit --    Transfer Training PT 2 LTG, [place] Level minimum assist (75% patient effort) --    Transfer Training PT 2 LTG, Assist Device other (see comments)  (appropriate AD) --    Transfer Training PT 2 LTG, Date Goal Reviewed 02/05/18 --    Transfer Training PT 2 LTG, Outcome --  goal ongoing     Goal: Wheelchair Propulsion Goal LTG- PT  Outcome: Ongoing (interventions implemented as appropriate)   02/05/18 0845 02/07/18 1607   Wheelchair Propulsion PT LTG   Wheelchair Propulsion Goal PT LTG, Date Established 02/05/18 --    Wheelchair Propulsion Goal PT LTG, Time to Achieve 5 days --    Wheelchair Propulsion Goal PT LTG, [place] Level supervision required --    Wheelchair Propulsion Goal PT LTG, Distance to Achieve 50 feet --    Wheelchair Propulsion Goal PT LTG, Date Goal Reviewed 02/05/18 --    Wheelchair Propulsion Goal PT LTG, Outcome --  goal ongoing

## 2018-02-07 NOTE — PROGRESS NOTES
Continued Stay Note  [facility]     Patient Name: Kristin Magdaleno  MRN: [id number]  Today's Date: 2/7/2018    Admit Date: 2/4/2018          Discharge Plan       02/07/18 1033    Case Management/Social Work Plan    Plan SNF    Patient/Family In Agreement With Plan yes    Additional Comments Patient's status under Medicare is now inpatient and she is able to admit for skilled rehab. Spoke with patient's daughter by phone. Patient was sleeping. She states they would like referrals to [facility], [facility], [facility], and [facility]. Referrals called to Elda with [facility], Chasidy with [facility], and Dacia with [facility]. Neither [place] facility will have a bed this week. Waiting on updates from Chasidy and Dacia. Pattieient's daughter states they will take thier mother by car. CM will continue to follow.               Discharge Codes     None        Expected Discharge Date and Time     Expected Discharge Date Expected Discharge Time    Feb 10, 2018             Kristin Gant

## 2018-02-07 NOTE — THERAPY TREATMENT NOTE
Acute Care - Physical Therapy Treatment Note  [facility]     Patient Name: Kristin Magdaleno  : 1935  MRN: [id number]  Today's Date: 2018  Onset of Illness/Injury or Date of Surgery Date: 18  Date of Referral to PT: 18  Referring Physician: JAY Wilcox    Admit Date: 2018    Visit Dx:    ICD-10-CM ICD-9-CM   1. Closed fracture of multiple ribs of left side, initial encounter S22.42XA 807.09   2. Contusion of left lower extremity, initial encounter S80.12XA 924.5   3. Fall, initial encounter W19.XXXA E888.9   4. Subcutaneous emphysema, initial encounter T79.7XXA 958.7   5. Impaired functional mobility, balance, gait, and endurance Z74.09 V49.89     Patient Active Problem List   Diagnosis   • Essential hypertension, benign   • Pure hypercholesterolemia   • Diabetes mellitus without complication   • Coronary atherosclerosis   • CVA (cerebral vascular accident)   • Atrial fibrillation   • Rheumatic heart disease   • Multiple closed fractures of ribs of left side   • Heart murmur   • Subcutaneous emphysema   • Contusion of left lower extremity   • Renal cyst               Adult Rehabilitation Note       18 1534 18 0930       Rehab Assessment/Intervention    Discipline physical therapist  -KR physical therapy assistant  -UD     Document Type therapy note (daily note)  -KR therapy note (daily note)  -UD     Subjective Information agree to therapy;no complaints  -KR agree to therapy;complains of;weakness;pain  -UD     Patient Effort, Rehab Treatment good  -KR adequate  -UD     Symptoms Noted During/After Treatment increased pain;fatigue  -KR increased pain  -UD     Precautions/Limitations fall precautions;brace on when up   R LE brace; rib fxs on L  -KR fall precautions  -UD     Specific Treatment Considerations  --   old cva rt,brace rt leg  -UD     Recorded by [KR] Elvira Gil, PT [UD] Bri Potter PTA     Vital Signs    Pre Systolic BP Rehab 150  -KR      Pre Treatment  Diastolic BP 61  -KR      Pretreatment Heart Rate (beats/min) 76  -KR      Posttreatment Heart Rate (beats/min) 75  -KR      Pre SpO2 (%) 89  -KR      O2 Delivery Pre Treatment room air  -KR      Post SpO2 (%) 91  -KR      O2 Delivery Post Treatment room air  -KR room air  -UD     Pre Patient Position Sitting  -KR Supine  -UD     Intra Patient Position Standing  -KR Standing  -UD     Post Patient Position Sitting  -KR Sitting  -UD     Recorded by [KR] Elvira Gil, PT [UD] Bri Potter PTA     Pain Assessment    Pain Assessment 0-10  -KR Sanchez-Bustillo FACES  -UD     Sanchez-Bustillo FACES Pain Rating  6  -UD     Pain Score 0  -KR      Post Pain Score 4  -KR      Pain Type Acute pain  -KR Acute pain  -UD     Pain Location Rib cage  -KR Chest  -UD     Pain Orientation Left  -KR Left  -UD     Pain Intervention(s) Repositioned  -KR      Response to Interventions tolerated  -KR      Recorded by [KR] Elvira Gil, PT [UD] Bri Potter PTA     Cognitive Assessment/Intervention    Current Cognitive/Communication Assessment impaired  -KR      Orientation Status oriented to;person;time  -KR oriented to;person;situation  -UD     Follows Commands/Answers Questions able to follow single-step instructions;100% of the time;needs cueing;needs increased time  -KR      Personal Safety WNL/WFL  -KR      Personal Safety Interventions fall prevention program maintained;gait belt;nonskid shoes/slippers when out of bed  -KR fall prevention program maintained  -UD     Recorded by [KR] Elvira Gil, PT [UD] Bri Potter PTA     Bed Mobility, Assessment/Treatment    Bed Mob, Supine to Sit, Hall not tested  -KR maximum assist (25% patient effort);2 person assist required  -UD     Bed Mob, Sit to Supine, Hall not tested  -KR      Bed Mobility, Comment UIC  -KR      Recorded by [KR] Elvira Gil, PT [UD] Bri Potter PTA     Transfer Assessment/Treatment    Transfers, Bed-Chair Hall  maximum assist (25% patient  effort);2 person assist required  -UD     Transfers, Sit-Stand [place] moderate assist (50% patient effort);2 person assist required;verbal cues required  -KR maximum assist (25% patient effort);2 person assist required  -UD     Transfers, Stand-Sit [place] moderate assist (50% patient effort);2 person assist required;verbal cues required  -KR maximum assist (25% patient effort);2 person assist required  -UD     Transfer, Safety Issues step length decreased;weight-shifting ability decreased;sequencing ability decreased  -KR      Transfer, Impairments strength decreased;ROM decreased;impaired balance;pain;postural control impaired  -KR      Transfer, Comment STS x4 from chair. VC's to push up from chair and for upright posture. Pt c/o increased pain in her ribs during transfer. Pt able to take one step forward and backward with LLE, but fatigued quickly; unable to take additional steps.   -KR      Recorded by [KR] Elvira Gil, PT [UD] Bri Potter, PTA     Gait Assessment/Treatment    Gait, [place] Level not appropriate to assess  -KR other (see comments)   pivot to chair  -UD     Gait, Comment Pt non-ambulatory at baseline.  -KR      Recorded by [KR] Elvira Gil, PT [UD] Bri Potter, PTA     Motor Skills/Interventions    Additional Documentation Balance Skills Training (Group)  -KR      Recorded by [KR] Elvira Gil, PT      Balance Skills Training    Sitting-Level of Assistance Minimum assistance  -KR      Sitting-Balance Support Left upper extremity supported;Feet supported  -KR      Standing-Level of Assistance Moderate assistance;x2  -KR      Static Standing Balance Support Right upper extremity supported;Left upper extremity supported  -KR      Recorded by [KR] Elvira Gil, PT      Positioning and Restraints    Pre-Treatment Position sitting in chair/recliner  -KR in bed  -UD     Post Treatment Position chair  -KR chair  -UD     In Chair reclined;call light within reach;encouraged to  call for assist;exit alarm on;waffle cushion;on mechanical lift sling;legs elevated  -KR notified nsg;reclined;sitting;call light within reach;exit alarm on;with family/caregiver;legs elevated  -UD     Recorded by [KR] Elvira Gil, PT [UD] Bri Potter, COSMO       User Key  (r) = Recorded By, (t) = Taken By, (c) = Cosigned By    Initials Name Effective Dates    UD Bri Potter, PTA 06/22/15 -     KR Elvira Gil, PT 09/25/17 -                 IP PT Goals       02/07/18 1607 02/06/18 1017 02/05/18 0845    Bed Mobility PT LTG    Bed Mobility PT LTG, Date Established   02/05/18  -LR    Bed Mobility PT LTG, Time to Achieve   5 days  -LR    Bed Mobility PT LTG, Activity Type   supine to sit/sit to supine  -LR    Bed Mobility PT LTG, [place] Level   minimum assist (75% patient effort)  -LR    Bed Mobility PT LTG, Date Goal Reviewed   02/05/18  -LR    Bed Mobility PT LTG, Outcome goal ongoing  -KR goal ongoing  -UD goal ongoing  -LR    Transfer Training PT LTG    Transfer Training PT LTG, Date Established   02/05/18  -LR    Transfer Training PT LTG, Time to Achieve   5 days  -LR    Transfer Training PT LTG, Activity Type   bed to chair /chair to bed  -LR    Transfer Training PT LTG, [place] Level   minimum assist (75% patient effort)  -LR    Transfer Training PT LTG, Assist Device   other (see comments)   appropriate AD  -LR    Transfer Training PT  LTG, Date Goal Reviewed   02/05/18  -LR    Transfer Training PT LTG, Outcome goal ongoing  -KR goal ongoing  -UD goal ongoing  -LR    Transfer Training 2 PT LTG    Transfer Training PT 2 LTG, Date Established   02/05/18  -LR    Transfer Training PT 2 LTG, Time to Achieve   5 days  -LR    Transfer Training PT 2 LTG, Activity Type   sit to stand/stand to sit  -LR    Transfer Training PT 2 LTG, [place] Level   minimum assist (75% patient effort)  -LR    Transfer Training PT 2 LTG, Assist Device   other (see comments)   appropriate AD  -LR    Transfer Training PT  2 LTG, Date Goal Reviewed   02/05/18  -LR    Transfer Training PT 2 LTG, Outcome goal ongoing  -KR goal ongoing  -UD goal ongoing  -LR    Wheelchair Propulsion PT LTG    Wheelchair Propulsion Goal PT LTG, Date Established   02/05/18  -LR    Wheelchair Propulsion Goal PT LTG, Time to Achieve   5 days  -LR    Wheelchair Propulsion Goal PT LTG, [place] Level   supervision required  -LR    Wheelchair Propulsion Goal PT LTG, Distance to Achieve   50 feet  -LR    Wheelchair Propulsion Goal PT LTG, Date Goal Reviewed   02/05/18  -LR    Wheelchair Propulsion Goal PT LTG, Outcome goal ongoing  -KR goal ongoing  -UD goal ongoing  -LR      User Key  (r) = Recorded By, (t) = Taken By, (c) = Cosigned By    Initials Name Provider Type    UD Bri Potter, PTA Physical Therapy Assistant    LR Kari Diaz, PT Physical Therapist    KR Elvira Gil, PT Physical Therapist          Physical Therapy Education     Title: PT OT SLP Therapies (Active)     Topic: Physical Therapy (Active)     Point: Mobility training (Active)    Learning Progress Summary    Learner Readiness Method Response Comment Documented by Status   Patient Acceptance E NR  KR 02/07/18 1607 Active    Acceptance E,D DU,NR  UD 02/06/18 1017 Done    Acceptance E VU  EM 02/06/18 0413 Done    Acceptance E,D VU,NR Educated on progression of POC, goals. Benefits of mobility.  02/05/18 0941 Done   Family Acceptance E,D DU,NR  UD 02/06/18 1017 Done    Acceptance E,D VU,NR Educated on progression of POC, goals. Benefits of mobility.  02/05/18 0941 Done               Point: Home exercise program (Done)    Learning Progress Summary    Learner Readiness Method Response Comment Documented by Status   Patient Acceptance E,D DU,NR  UD 02/06/18 1017 Done    Acceptance E VU  EM 02/06/18 0413 Done    Acceptance E,D VU,NR Educated on progression of POC, goals. Benefits of mobility.  02/05/18 0941 Done   Family Acceptance E,D DU,NR  UD 02/06/18 1017 Done    Acceptance  E,D VU,NR Educated on progression of POC, goals. Benefits of mobility.  02/05/18 0941 Done               Point: Body mechanics (Active)    Learning Progress Summary    Learner Readiness Method Response Comment Documented by Status   Patient Acceptance E NR  KR 02/07/18 1607 Active    Acceptance E,D DU,NR   02/06/18 1017 Done    Acceptance E VU  EM 02/06/18 0413 Done    Acceptance E,D VU,NR Educated on progression of POC, goals. Benefits of mobility. LR 02/05/18 0941 Done   Family Acceptance E,D DU,NR   02/06/18 1017 Done    Acceptance E,D VU,NR Educated on progression of POC, goals. Benefits of mobility. LR 02/05/18 0941 Done               Point: Precautions (Active)    Learning Progress Summary    Learner Readiness Method Response Comment Documented by Status   Patient Acceptance E NR  KR 02/07/18 1607 Active    Acceptance E,D DU,NR   02/06/18 1017 Done    Acceptance E VU  EM 02/06/18 0413 Done    Acceptance E,D VU,NR Educated on progression of POC, goals. Benefits of mobility. LR 02/05/18 0941 Done   Family Acceptance E,D DU,NR   02/06/18 1017 Done    Acceptance E,D VU,NR Educated on progression of POC, goals. Benefits of mobility.  02/05/18 0941 Done                      User Key     Initials Effective Dates Name Provider Type Discipline     06/22/15 -  Bri Potter, PTA Physical Therapy Assistant PT    LR 06/19/15 -  Kari Diaz, PT Physical Therapist PT     09/25/17 -  Elvira Gil, PT Physical Therapist PT    EM 06/12/17 -  Cammie Ruiz, RN Registered Nurse Nurse                    PT Recommendation and Plan  Anticipated Equipment Needs At Discharge:  (none)  Anticipated Discharge Disposition: home with assist, home with home health, other (see comments) (depending on progress, may need rehab)  Planned Therapy Interventions: balance training, bed mobility training, home exercise program, patient/family education, ROM (Range of Motion), strengthening, transfer training, wheelchair  management/propulsion training  PT Frequency: daily  Plan of Care Review  Plan Of Care Reviewed With: patient  Progress: progress toward functional goals as expected  Outcome Summary/Follow up Plan: Pt STS x4 from chair with modA x2. Pt able to take 1 step forward and backward with LLE, requiring maxA x2. Pt limited by increased rib pain on L and fatigue. Continue to progress as appropriate.           Outcome Measures       02/07/18 1534 02/06/18 0930 02/05/18 0845    How much help from another person do you currently need...    Turning from your back to your side while in flat bed without using bedrails? 2  -KR 1  -UD 2  -LR    Moving from lying on back to sitting on the side of a flat bed without bedrails? 2  -KR 2  -UD 2  -LR    Moving to and from a bed to a chair (including a wheelchair)? 2  -KR 2  -UD 2  -LR    Standing up from a chair using your arms (e.g., wheelchair, bedside chair)? 2  -KR 2  -UD 2  -LR    Climbing 3-5 steps with a railing? 1  -KR 1  -UD 1  -LR    To walk in hospital room? 1  -KR 1  -UD 1  -LR    AM-PAC 6 Clicks Score 10  -KR 9  -UD 10  -LR    Functional Assessment    Outcome Measure Options AM-PAC 6 Clicks Basic Mobility (PT)  -KR  AM-PAC 6 Clicks Basic Mobility (PT)  -LR      User Key  (r) = Recorded By, (t) = Taken By, (c) = Cosigned By    Initials Name Provider Type    PANTERA Potter, PTA Physical Therapy Assistant    LR Kari Diaz, PT Physical Therapist    GAGE Gil PT Physical Therapist           Time Calculation:         PT Charges       02/07/18 1612          Time Calculation    Start Time 1534  -KR      PT Received On 02/07/18  -KR      PT Goal Re-Cert Due Date 02/15/18  -KR      Time Calculation- PT    Total Timed Code Minutes- PT 24 minute(s)  -KR        User Key  (r) = Recorded By, (t) = Taken By, (c) = Cosigned By    Initials Name Provider Type    GAGE Gil, PT Physical Therapist          Therapy Charges for Today     Code Description Service Date  Service Provider Modifiers Qty    96732977579 HC PT THER PROC EA 15 MIN 2/7/2018 Elvira Gil, PT GP 2    [id number] HC PT THER SUPP EA 15 MIN 2/7/2018 Elvira Gil, PT GP 2          PT G-Codes  PT Professional Judgement Used?: Yes  Outcome Measure Options: AM-PAC 6 Clicks Basic Mobility (PT)  Score: 10  Functional Limitation: Mobility: Walking and moving around  Mobility: Walking and Moving Around Current Status (): At least 60 percent but less than 80 percent impaired, limited or restricted  Mobility: Walking and Moving Around Goal Status (): At least 40 percent but less than 60 percent impaired, limited or restricted    Olivia Gil, PT  2/7/2018

## 2018-02-07 NOTE — DISCHARGE PLACEMENT REQUEST
"Kristin Lowe (83 y.o. Female)     From Kristin,   [phone]      Date of Birth Social Security Number Address Home Phone MRN    1935  [address] [address] [phone] [id number]    [employer] Marital Status          Unknown        Admission Date Admission Type Admitting Provider Attending Provider Department, Room/Bed    2/4/18 Emergency Elina Jean MD [place], Justin Dixon MD [facility] 3E, S341/1    Discharge Date Discharge Disposition Discharge Destination                      Attending Provider: Justin Doyle MD     Allergies:  Cephalosporins, Morphine And Related, Penicillins    Isolation:  None   Infection:  None   Code Status:  FULL    Ht:  177.8 cm (70\")   Wt:  55.1 kg (121 lb 8 oz)    Admission Cmt:  None   Principal Problem:  Multiple closed fractures of ribs of left side [S22.42XA]                 Active Insurance as of 2/4/2018     Primary Coverage     Payor Plan Insurance Group Employer/Plan Group    MEDICARE MEDICARE A & B      Payor Plan Address Payor Plan Phone Number Effective From Effective To    [address] [phone] 7/1/1996     [place] [address]       Subscriber Name Subscriber Birth Date Member ID       KRISTIN LOWE 1935 [id number]           Secondary Coverage     Payor Plan Insurance Group Employer/Plan Group    AARP MED SUPP AAR HEALTH CARE OPTIONS      Payor Plan Address Payor Plan Phone Number Effective From Effective To    [facility] [phone] 1/1/2018     [id number]       [place] [address]       Subscriber Name Subscriber Birth Date Member ID       KRISTIN LOWE 1935 [id number]                 Emergency Contacts      (Rel.) Home Phone Work Phone Mobile Phone    Sara Bah (Daughter) [phone] -- [phone]               History & Physical      Elina Jean MD at 2/4/2018  3:45 PM              [facility] Medicine " Services  HISTORY AND PHYSICAL    Patient Name: Kristin Magdaleno  : 1935  MRN: [id number]  Primary Care Physician: Maryann Guillaume MD    Subjective   Subjective     Chief Complaint:  Lt rib and LLE pain s/p fall     HPI:  Kristin Magdaleno is a 83 y.o. female past medical history significant for hypertension, hyperlipidemia, type 2 diabetes, CAD, hypothyroidism.  She underwent CABG in  and experienced a CVA and went into A. fib intra-op.  Postop had right total hemiparesis.  Has been wheelchair bound and on Coumadin since that time.  He lives at home with her .  She is a chronic decubitus ulcer to her coccyx.  Bilateral lower extremity edema right greater than left due to immobility.  She is followed by her PCP for all medical conditions and a dermatologist for general skin issues.  No current cardiologist.  She is awake and alert.  She presents to [facility] ER after standing from the toilet this morning approximately 9 AM losing her balance and falling into her tub.  He landed on her left side.  No loss of consciousness, dizziness or shortness of air.  Pain and decreased mobility she was seen in ER.  X-rays revealed ninth and 10th rib fractures on the left as well as all amount of adjacent pleural thickening and minimal atelectasis.  Trace intrapleural air and a small amount of air along the chest wall with no evidence of significant pneumothorax or effusion noted.  Incidental probable small left renal cyst and can be followed up outpatient further if needed.  She also was noted to have a left lower extremity contusion.  He admitted to hospital medicine service for observation of her fractures as well as subcutaneous emphysema.  Also on chronic Coumadin post fall for further bleeding monitoring.  Plan to hold Coumadin for now and monitor for bleeding.  We'll need to resume and/or initiate heparin therapy as soon as medically stable.  At time of admit to unit she is awake and alert.  Family at  bedside.  Only complaint is left rib and left lower extremity soreness.    Review of Systems   Constitutional: Positive for activity change and fatigue. Negative for chills and fever.   HENT: Negative.    Eyes: Negative.    Respiratory: Negative for cough, choking, chest tightness, shortness of breath, wheezing and stridor.    Cardiovascular: Positive for chest pain and leg swelling. Negative for palpitations.        Chest wall and rib pain s/p fall.  Chronic BLE edema R>L with rt hemiparesis.  Up per W/C   Gastrointestinal: Negative for abdominal distention, abdominal pain, constipation, diarrhea, nausea and vomiting.   Endocrine: Negative.    Genitourinary: Negative.    Musculoskeletal: Positive for arthralgias and gait problem.        W/C bound s/p CVA.  Pivots on LLE normally   Skin: Positive for pallor and wound.        Lt lateral chest wall/9th rib area with contusion.  LLE with later leg contusion.     Allergic/Immunologic: Negative.    Neurological: Negative for dizziness, seizures, syncope and light-headedness.   Hematological: Bruises/bleeds easily.        On coumadin   Psychiatric/Behavioral: Negative for agitation and confusion. The patient is not nervous/anxious.            Otherwise 10-system ROS reviewed and is negative except as mentioned in the HPI.    Personal History     Past Medical History:   Diagnosis Date   • Atrial fibrillation    • CAD (coronary artery disease)     s/p cabg   • Chronic anticoagulation    • Diabetes mellitus     type II    • H/O hemiparesis     Rt sided    • Heart murmur     RHF as child.  no recent echos (pt declines)   • Hyperlipidemia    • Hypertension    • Hypothyroid    • Rheumatic heart disease     as a child, heart murmur, no recent echos (pt. declines)   • Stroke     20+ years ago in OR during CABG        Past Surgical History:   Procedure Laterality Date   • APPENDECTOMY     • BREAST SURGERY      no breast tissue, never had tara, mel. breast implants   • CORONARY  ARTERY BYPASS GRAFT  1994    CVA  and a fib post-op - on coumadin since   • TOTAL HIP ARTHROPLASTY REVISION Right 1998       Family History: family history includes Diabetes in her brother; Heart disease in her brother; Lung cancer in her father; No Known Problems in her daughter, daughter, and son; Stomach cancer in her mother.     Social History:  reports that she quit smoking about 25 years ago. Her smoking use included Cigarettes. She started smoking about 55 years ago. She has a 31.00 pack-year smoking history. She has never used smokeless tobacco. She reports that she does not drink alcohol or use illicit drugs.  Social History     Social History Narrative    Hx of CVA in 1994 during CABG surgery with Rt hemiparesis and afib on coumadin since.  Elderly, frail and up in W/c.  Lives with         Medications:  Prescriptions Prior to Admission   Medication Sig Dispense Refill Last Dose   • atorvastatin (LIPITOR) 40 MG tablet Take 1 tablet by mouth Daily. 90 tablet 1    • digoxin (LANOXIN) 250 MCG tablet TAKE 1 TABLET BY MOUTH DAILY. 90 tablet 1    • fluocinonide-emollient (LIDEX-E) 0.05 % cream Apply  topically 2 (Two) Times a Day. To shins as needed for redness 15 g 1    • losartan-hydrochlorothiazide (HYZAAR) 100-25 MG per tablet Take 1 tablet by mouth Daily. 90 tablet 1    • metFORMIN (GLUCOPHAGE) 1000 MG tablet Take 1 tablet by mouth 2 (Two) Times a Day With Meals. 180 tablet 1    • metoprolol succinate XL (TOPROL XL) 25 MG 24 hr tablet Take 1 tablet by mouth Daily. 30 tablet 5    • Multiple Vitamins-Minerals (PRESERVISION AREDS 2 PO) Take 2 capsules by mouth Daily.      • pioglitazone (ACTOS) 15 MG tablet Take 1 tablet by mouth Daily. 90 tablet 1    • traZODone (DESYREL) 50 MG tablet Take 1 tablet by mouth Every Night. 90 tablet 3    • warfarin (COUMADIN) 3 MG tablet Take 3 mg by mouth 1 (One) Time Per Week. sundays      • warfarin (COUMADIN) 4 MG tablet Take 4 mg by mouth See Admin Instructions.  Mondays, Tuesdays, Wednesdays, Thursdays, Fridays and Saturdays          Allergies   Allergen Reactions   • Cephalosporins      adulthood   • Morphine And Related      unknown   • Penicillins      Penicillin G Potassium       Objective   Objective     Vital Signs:   Temp:  [98.2 °F (36.8 °C)-98.3 °F (36.8 °C)] 98.3 °F (36.8 °C)  Heart Rate:  [] 107  Resp:  [18-20] 20  BP: (176-198)/(77-85) 180/78        Physical Exam   Constitutional: She is oriented to person, place, and time. She appears frail and thin  HENT:   Head: Atraumatic. Extreme thinning of hair.     Eyes: Conjunctivae are normal.   Neck: Normal range of motion. Neck supple. Trachea midline   Cardiovascular: Mild tachy, murmur 2/6, and intact distal pulses.    Pulmonary/Chest: Effort normal. No respiratory distress although pain with deep inspiration. She has no wheezes. She exhibits tenderness (Lt lower and lateral chest wall and LLE). She exhibits no crepitus, no swelling and no retraction. Breast implants bilatera.   Abdominal: Soft. Nontender.  No guarding or rebound.  +bowel sounds.  Musculoskeletal:        Left hip: She exhibits normal range of motion, no tenderness and no deformity.        Left lower leg: She exhibits tenderness (with contusion mid tib fib) and swelling.   Neurological: She is alert and oriented to person, place, and time. A sensory deficit (Right UE and LE chronically with rt hemiparesis s/p old CVA.   RUE hand contracted) is present.   Chronic right hemiplegia.    Skin: Skin is warm. Contusions as noted to Lt lateral chest wall and LLE  Psychiatric: She has a normal mood and affect. Pleasant and cooperative.   Nursing note and vitals reviewed.       Results Reviewed:  I have personally reviewed current lab, radiology, and data and agree.      Results from last 7 days  Lab Units 02/04/18  1138   WBC 10*3/mm3 13.90*   HEMOGLOBIN g/dL 11.3*   HEMATOCRIT % 37.2   PLATELETS 10*3/mm3 377   INR  2.49       Results from last 7  days  Lab Units 02/04/18  1138   SODIUM mmol/L 143   POTASSIUM mmol/L 4.2   CHLORIDE mmol/L 107   CO2 mmol/L 31.0   BUN mg/dL 24*   CREATININE mg/dL 0.80   GLUCOSE mg/dL 114*   CALCIUM mg/dL 9.0   ALT (SGPT) U/L 15   AST (SGOT) U/L 18     Estimated Creatinine Clearance: 48 mL/min (by C-G formula based on Cr of 0.8).  Brief Urine Lab Results     None        No results found for: BNP  No results found for: PHART  Imaging Results (last 24 hours)     Procedure Component Value Units Date/Time    XR Pelvis 1 or 2 View [[id number]] Updated:  02/04/18 1153    XR Tibia Fibula 2 View Left [[id number]] Updated:  02/04/18 1154    CT Chest Without Contrast [[id number]] Collected:  02/04/18 1359     Updated:  02/04/18 1400    Narrative:       EXAMINATION: CT CHEST WO CONTRAST - 02/04/2018     INDICATION: Fall, chest pain.     TECHNIQUE: Spiral acquisition 5 mm axial images through the chest and  upper abdomen with coronal 2D reconstructions.     The radiation dose reduction device was turned on for each scan per the  ALARA (As Low as Reasonably Achievable) protocol.     COMPARISON: None.     FINDINGS: The patient's anatomy is distorted due to kyphosis. The most  inferior portions of the ribs are not included on this study. There are,  however, mildly displaced posterior left ninth and tenth rib fractures.  No definite rib fracture is identified elsewhere. There is a small area  of immediately adjacent atelectasis in the left lower lobe, trace  pleural thickening, and a minute amount of air seemingly within the  pleura. A small amount of air extends along the left lateral margin of  the chest wall external to the ribs. There is no evidence of significant  pneumothorax. No evidence of potential pulmonary contusion is seen.  There are only mild chronic- appearing lung changes elsewhere and a few  granulomatous calcifications.     Mediastinal window images show no evidence of mediastinal mass,  adenopathy or pericardial effusion.      Included images of the upper abdomen show no significant abnormalities  of the visualized portions of the liver, spleen, pancreatic tail, or  adrenal glands. Left kidney contains a bland but intermediate to soft  tissue density dorsal midpole exophytic lesion 17 mm in diameter, much  more typical for proteinaceous cyst than for neoplasm.        Impression:       1. Mildly displaced posterior left ninth and tenth rib fractures. Small  amount of adjacent pleural thickening and minimal atelectasis. Trace  intrapleural air and small amount of air along the chest wall. No  evidence of significant pneumothorax or effusion.  2. No other evidence of acute trauma.  3. Probable small hyperdense left renal cyst. If patient has hematuria,  follow-up with renal ultrasound could be considered for further  characterization.     DICTATED:     02/04/2018  EDITED    :     02/04/2018                    Assessment/Plan   Assessment / Plan     Hospital Problem List     * (Principal)Multiple closed fractures of ribs of left side    Essential hypertension, benign    Pure hypercholesterolemia    Diabetes mellitus without complication    Overview Signed 7/20/2016  7:09 PM by Vick Diaz     Type 2 or unspecified, not stated as uncontrolled.         CVA (cerebral vascular accident)    Overview Signed 2/4/2018  3:38 PM by JAY Ellison     With Rt hemiparesis x 20+ years         Atrial fibrillation    Rheumatic heart disease    Overview Signed 9/7/2017  8:29 AM by Maryann Guillaume MD     as a child, heart murmur, no recent echos (pt. declines)         Heart murmur (Chronic)    Overview Signed 2/4/2018  3:35 PM by JAY Ellison     RHF as child.  no recent echos (pt declines)         Subcutaneous emphysema    Overview Signed 2/4/2018  3:36 PM by JAY Ellison     Acute s/p fall         Contusion of left lower extremity        84 yo female with hx of rt hemiparesis s/p CVA 1994 presents with  "lt rib and LLE pain s/p fall at home this am.  Found to have Lt 9th and 10th rib fxs and LLE contusion.  Also sq emphysema.  Hx of coumadin.  Monitor.      Assessment & Plan:  Lt rib fractures s/p Fall (ribs 9/10)  --pain meds  --PT/OT  LLE contusion  --monitor  Trace intrapleural air and sm amt of air along chest wall  --per CT chest  --no evidence of pneumo or effusion  --monitor  DM type II (A1c 5.9--9/2017)  --accuchecks, LDSSI achs for now  --hold orals  --ck A1c with am labs   Decub on coccyx (\"for a long time\" per pt/da)  --WOC  Hx CAD s/p CABG 1994- CVA and afib in OR...  --on coumadin since.  --hold coumadin for now due to fall this am with contusions  --resume asap   HTN/HLD  --home meds  --reports NO cardiologist   Hypothyroidism (per PCP office notes..the patient/fam denied)  --alopecia noted.  No thyroid meds on home list  --ck TSH      DVT prophylaxis:  No teds/seqs due injury/edema  No A/C for now due to hx of coumadin s/p fall this am.  Resume coumadin asap vs heparin sq    CODE STATUS:  Full Code  Viviana Atkinson Brenden, APRN  02/04/18   3:45 PM    Brief Attending Note   I have seen and examined the patient, performing an independent face-to-face diagnostic evaluation with plan of care reviewed and developed with the advanced practice clinician (APC).  Brief Summary Statement/HPI:   82 yo with HO stroke and long term dense right hemiparesis who slipped off the toilet today and broke some ribs.  She usually helps with transfers. Her only complaint is localized pain.  She reports weight loss, poor appetite and chronic edema of her feet.  Attending Physical Exam:  /78 (BP Location: Right arm, Patient Position: Lying)  Pulse 107  Temp 98.3 °F (36.8 °C) (Oral)   Resp 20  Ht 177.8 cm (70\")  Wt 55.1 kg (121 lb 8 oz)  SpO2 93%  BMI 17.43 kg/m2    Patient is alert and talkative in no distress at rest  Neck is without mass or JVD  Heart is Reg wo murmur, hyperdynamic  Lungs are clear wo wheeze " "or crackle  Abd is soft without HSM or mass  Dense right hemiparesis  Skin is without rash, doughy skin in her ankles  Neurologic exam- clear speech, intellect intact  Mood is appropriate  Data:  Reviewed labs and rads  Brief Assessment/Plan :  Left rib fractures after a mechanical fall  HYpertension  Weight loss  Chronic leg edema  -control pain, inspirometer  - wound care and PT to see patient tomorrow.  - may need more BP meds-  Try Breeze dietary supplements.    See above for further detailed assessment and plan developed with APC which I have reviewed and/or edited.  I have discussed the patient with daughter at the bedside    I believe this patient meets obs status for now    Elina Jean MD 18 4:20 PM             Electronically signed by Elina Jean MD at 2018  4:23 PM           Physician Progress Notes (last 24 hours) (Notes from 2018 10:20 AM through 2018 10:20 AM)      Suni Barrios PA-C at 2018  3:46 PM  Version 2 of 2             [facility] Medicine Services  PROGRESS NOTE    Patient Name: Kristin Magdaleno  : 1935  MRN: [id number]    Date of Admission: 2018  Length of Stay: 0  Primary Care Physician: Maryann Guillaume MD    Subjective     CC: follow up L rib fractures    HPI:  Up in chair, daughter at bedside. Her pain is controlled with scheduled Norco. \"Twinges\" of pain, worse with movement and deep breathing. Bowels haven't moved since prior to admission. Awaiting chart review from [facility] for possible rehab bed.     Review of Systems  Gen- No fevers, chills  CV- No chest pain, palpitations  Resp- No cough, dyspnea  GI- No N/V/D, abd pain  Musk- L rib pain, worse with inspiration/movement    Otherwise ROS is negative except as mentioned in the HPI.    Objective     Vital Signs:   Temp:  [98.3 °F (36.8 °C)-98.9 °F (37.2 °C)] 98.5 °F (36.9 °C)  Heart Rate:  [] 90  Resp:  [14-18] 16  BP: (113-163)/(51-67) 113/51        Physical " Exam:  Constitutional: Thin, elderly female. NAD. Awake, alert, pleasant and conversant.   HENT: NCAT, mucous membranes moist  Respiratory: Clear to auscultation bilaterally, respiratory effort normal   Cardiovascular: RRR, no murmurs, rubs, or gallops, palpable pedal pulses bilaterally  Gastrointestinal: Positive bowel sounds, soft, nontender, nondistended  Musculoskeletal: No bilateral ankle edema  Psychiatric: Appropriate affect, cooperative  Neurologic: Oriented x 3, strength symmetric in all extremities, Cranial Nerves grossly intact to confrontation, speech clear  Skin: No rashes      Results Reviewed:  I have personally reviewed current lab, radiology, and data and agree.      Results from last 7 days  Lab Units 02/06/18  0737 02/05/18  0601 02/04/18  1138   WBC 10*3/mm3 9.89 9.71 13.90*   HEMOGLOBIN g/dL 10.5* 10.0* 11.3*   HEMATOCRIT % 32.9* 31.8* 37.2   PLATELETS 10*3/mm3 335 339 377   INR  1.33 1.86 2.49       Results from last 7 days  Lab Units 02/05/18  0601 02/04/18  1138   SODIUM mmol/L 138 143   POTASSIUM mmol/L 4.0 4.2   CHLORIDE mmol/L 104 107   CO2 mmol/L 31.0 31.0   BUN mg/dL 16 24*   CREATININE mg/dL 0.60 0.80   GLUCOSE mg/dL 91 114*   CALCIUM mg/dL 8.4* 9.0   ALT (SGPT) U/L  --  15   AST (SGOT) U/L  --  18     Estimated Creatinine Clearance: 48 mL/min (by C-G formula based on Cr of 0.6).  No results found for: BNP  No results found for: PHART    Microbiology Results Abnormal     None        Imaging Results (last 24 hours)     ** No results found for the last 24 hours. **        I have reviewed the medications.    Assessment / Plan     Hospital Problem List     * (Principal)Multiple closed fractures of ribs of left side    Essential hypertension, benign    Pure hypercholesterolemia    Diabetes mellitus without complication    Overview Signed 7/20/2016  7:09 PM by Vick Diaz     Type 2 or unspecified, not stated as uncontrolled.         CVA (cerebral vascular accident)    Overview Signed  2/4/2018  3:38 PM by JAY Ellison     With Rt hemiparesis x 20+ years         Atrial fibrillation    Rheumatic heart disease    Overview Signed 9/7/2017  8:29 AM by Maryann Guillaume MD     as a child, heart murmur, no recent echos (pt. declines)         Heart murmur (Chronic)    Overview Signed 2/4/2018  3:35 PM by JAY Ellison     RHF as child.  no recent echos (pt declines)         Subcutaneous emphysema    Overview Signed 2/4/2018  3:36 PM by JAY Ellison     Acute s/p fall         Contusion of left lower extremity    Renal cyst        Brief Hospital Course to date:  Kristin Magdaleno is a 83 y.o. 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 She presented to [facility] ED on 2/4/18 after a fall at home, sustaining multiple closed L rib fractures with development of subcutaneous edema.     9th and 10th L rib fractures  LLE contusion  Fall at home  - Continue PT/OT, case management following for possible rehab options at discharge  - Pain control PRN  - Incentive spirometry - teaching and encouragement. High risk of developing subsequent pneumonia   - No LLE fracture on radiology, mild medial ankle effusion appears stable     Trace intrapleural air and subcutaneous emphysema  - No evidence of pneumothorax or pleural effusion  - Continue to monitor  - 2/6 CXR appears stable, some increased markings along L lung base    Non-insulin dependent DMII  - Continue accuchecks and SSI  - Using little to no insulin  - Hgb A1c 5.9%     Chronic coccygeal decubitus ulcer  - WOC to evaluate     History of CAD s/p CABG   Intraoperative atrial fibrillation/CVA with residual R hemiparesis  - Maintained on Coumadin therapy  - Coumadin held upon admission due to fall with contusions  - Hemoglobin appears stable   - CHADS VASC = 7 (11% annual risk of stroke) - HAS BLED = 2 (4.1% annual  "risk of bleeding)   - Discussed with patient and , they would like to resume Coumadin.   - Pharmacy dosing. AM INR and CBC, no signs of blood loss    Hypothyroidism   - TSH elevated, however T4 normal  - Patient/family previously opted to stop Synthroid with guidance from PCP     Hypertension, continue home meds  Hyperlipidemia, statin     DVT Prophylaxis:  Mechanical  CODE STATUS: Full Code    Disposition: I expect the patient to be discharged home vs SNF tomorrow    Suni Barrios PA-C  18  3:47 PM           Electronically signed by Suni Barrios PA-C at 2018  3:54 PM      Suni Barrios PA-C at 2018  3:46 PM  Version 1 of 2             [facility] Medicine Services  PROGRESS NOTE    Patient Name: Kristin Magdaleno  : 1935  MRN: [id number]    Date of Admission: 2018  Length of Stay: 0  Primary Care Physician: Maryann Guillaume MD    Subjective     CC: follow up L rib fractures    HPI:  Up in chair, daughter at bedside. Her pain is controlled with scheduled Norco. \"Twinges\" of pain, worse with movement and deep breathing. Bowels haven't moved since prior to admission. Awaiting chart review from [facility] for possible rehab bed.     Review of Systems  Gen- No fevers, chills  CV- No chest pain, palpitations  Resp- No cough, dyspnea  GI- No N/V/D, abd pain  Musk- L rib pain, worse with inspiration/movement    Otherwise ROS is negative except as mentioned in the HPI.    Objective     Vital Signs:   Temp:  [98.3 °F (36.8 °C)-98.9 °F (37.2 °C)] 98.5 °F (36.9 °C)  Heart Rate:  [] 90  Resp:  [14-18] 16  BP: (113-163)/(51-67) 113/51        Physical Exam:  Constitutional: Thin, elderly female. NAD. Awake, alert, pleasant and conversant.   HENT: NCAT, mucous membranes moist  Respiratory: Clear to auscultation bilaterally, respiratory effort normal   Cardiovascular: RRR, no murmurs, rubs, or gallops, palpable pedal pulses bilaterally  Gastrointestinal: " Positive bowel sounds, soft, nontender, nondistended  Musculoskeletal: No bilateral ankle edema  Psychiatric: Appropriate affect, cooperative  Neurologic: Oriented x 3, strength symmetric in all extremities, Cranial Nerves grossly intact to confrontation, speech clear  Skin: No rashes      Results Reviewed:  I have personally reviewed current lab, radiology, and data and agree.      Results from last 7 days  Lab Units 02/06/18  0737 02/05/18  0601 02/04/18  1138   WBC 10*3/mm3 9.89 9.71 13.90*   HEMOGLOBIN g/dL 10.5* 10.0* 11.3*   HEMATOCRIT % 32.9* 31.8* 37.2   PLATELETS 10*3/mm3 335 339 377   INR  1.33 1.86 2.49       Results from last 7 days  Lab Units 02/05/18  0601 02/04/18  1138   SODIUM mmol/L 138 143   POTASSIUM mmol/L 4.0 4.2   CHLORIDE mmol/L 104 107   CO2 mmol/L 31.0 31.0   BUN mg/dL 16 24*   CREATININE mg/dL 0.60 0.80   GLUCOSE mg/dL 91 114*   CALCIUM mg/dL 8.4* 9.0   ALT (SGPT) U/L  --  15   AST (SGOT) U/L  --  18     Estimated Creatinine Clearance: 48 mL/min (by C-G formula based on Cr of 0.6).  No results found for: BNP  No results found for: PHART    Microbiology Results Abnormal     None        Imaging Results (last 24 hours)     ** No results found for the last 24 hours. **        I have reviewed the medications.    Assessment / Plan     Hospital Problem List     * (Principal)Multiple closed fractures of ribs of left side    Essential hypertension, benign    Pure hypercholesterolemia    Diabetes mellitus without complication    Overview Signed 7/20/2016  7:09 PM by Vick Diaz     Type 2 or unspecified, not stated as uncontrolled.         CVA (cerebral vascular accident)    Overview Signed 2/4/2018  3:38 PM by JAY Ellison     With Rt hemiparesis x 20+ years         Atrial fibrillation    Rheumatic heart disease    Overview Signed 9/7/2017  8:29 AM by Maryann Guillaume MD     as a child, heart murmur, no recent echos (pt. declines)         Heart murmur (Chronic)    Overview Signed  2/4/2018  3:35 PM by JAY Ellison     RHF as child.  no recent echos (pt declines)         Subcutaneous emphysema    Overview Signed 2/4/2018  3:36 PM by JAY Ellison     Acute s/p fall         Contusion of left lower extremity    Renal cyst        Brief Hospital Course to date:  Kristin Magdaleno is a 83 y.o. 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 She presented to [facility] ED on 2/4/18 after a fall at home, sustaining multiple closed L rib fractures with development of subcutaneous edema.     9th and 10th L rib fractures  LLE contusion  Fall at home  - Continue PT/OT, case management following for possible rehab options at discharge  - Pain control PRN  - Incentive spirometry - teaching and encouragement. High risk of developing subsequent pneumonia   - No LLE fracture on radiology, mild medial ankle effusion appears stable     Trace intrapleural air and subcutaneous emphysema  - No evidence of pneumothorax or pleural effusion  - Continue to monitor  - 2/6 CXR appears stable, some increased markings along L lung base    Non-insulin dependent DMII  - Continue accuchecks and SSI  - Using little to no insulin  - Hgb A1c 5.9%     Chronic coccygeal decubitus ulcer  - WOC to evaluate     History of CAD s/p CABG   Intraoperative atrial fibrillation/CVA with residual R hemiparesis  - Maintained on Coumadin therapy  - Coumadin held upon admission due to fall with contusions  - Hemoglobin appears stable   - CHADS VASC = 7 (11% annual risk of stroke) - HAS BLED = 2 (4.1% annual risk of bleeding)   - Discussed with patient and , they would like to resume Coumadin.   - Pharmacy dosing. AM INR and CBC, no signs of blood loss    Hypothyroidism   - TSH elevated, however T4 normal  - Patient/family previously opted to stop Synthroid with guidance from PCP     Hypertension, continue  home meds  Hyperlipidemia, statin     DVT Prophylaxis:  Mechanical  CODE STATUS: Full Code    Disposition: I expect the patient to be discharged home vs SNF tomorrow    Suni Barrios PA-C  18  3:47 PM           Electronically signed by Suni Barrios PA-C at 2018  3:54 PM           Physical Therapy Notes (most recent note)      Bri Potter PTA at 2018 10:20 AM  Version 1 of 1         Acute Care - Physical Therapy Treatment Note   [place]     Patient Name: Kristin Magdaleno  : 1935  MRN: [id number]  Today's Date: 2018  Onset of Illness/Injury or Date of Surgery Date: 18  Date of Referral to PT: 18  Referring Physician: JAY Wilcox    Admit Date: 2018    Visit Dx:    ICD-10-CM ICD-9-CM   1. Closed fracture of multiple ribs of left side, initial encounter S22.42XA 807.09   2. Contusion of left lower extremity, initial encounter S80.12XA 924.5   3. Fall, initial encounter W19.XXXA E888.9   4. Subcutaneous emphysema, initial encounter T79.7XXA 958.7   5. Impaired functional mobility, balance, gait, and endurance Z74.09 V49.89     Patient Active Problem List   Diagnosis   • Essential hypertension, benign   • Pure hypercholesterolemia   • Diabetes mellitus without complication   • Coronary atherosclerosis   • CVA (cerebral vascular accident)   • Atrial fibrillation   • Rheumatic heart disease   • Multiple closed fractures of ribs of left side   • Heart murmur   • Subcutaneous emphysema   • Contusion of left lower extremity   • Renal cyst               Adult Rehabilitation Note       18 0930          Rehab Assessment/Intervention    Discipline physical therapy assistant  -UD      Document Type therapy note (daily note)  -UD      Subjective Information agree to therapy;complains of;weakness;pain  -UD      Patient Effort, Rehab Treatment adequate  -UD      Symptoms Noted During/After Treatment increased pain  -UD      Precautions/Limitations fall  precautions  -UD      Specific Treatment Considerations --   old cva rt,brace rt leg  -UD      Recorded by [UD] Bri Potter PTA      Vital Signs    O2 Delivery Post Treatment room air  -UD      Pre Patient Position Supine  -UD      Intra Patient Position Standing  -UD      Post Patient Position Sitting  -UD      Recorded by [UD] Bri Potter PTA      Pain Assessment    Pain Assessment Sanchez-Bustillo FACES  -UD      Sanchez-Bustillo FACES Pain Rating 6  -UD      Pain Type Acute pain  -UD      Pain Location Chest  -UD      Pain Orientation Left  -UD      Recorded by [UD] Bri Potter PTA      Cognitive Assessment/Intervention    Orientation Status oriented to;person;situation  -UD      Personal Safety Interventions fall prevention program maintained  -UD      Recorded by [UD] Bri Potter PTA      Bed Mobility, Assessment/Treatment    Bed Mob, Supine to Sit, [place] maximum assist (25% patient effort);2 person assist required  -UD      Recorded by [UD] Bri Potter PTA      Transfer Assessment/Treatment    Transfers, Bed-Chair [place] maximum assist (25% patient effort);2 person assist required  -UD      Transfers, Sit-Stand [place] maximum assist (25% patient effort);2 person assist required  -UD      Transfers, Stand-Sit [place] maximum assist (25% patient effort);2 person assist required  -UD      Recorded by [UD] Bri Potter PTA      Gait Assessment/Treatment    Gait, [place] Level other (see comments)   pivot to chair  -UD      Recorded by [UD] Bri Potter PTA      Positioning and Restraints    Pre-Treatment Position in bed  -UD      Post Treatment Position chair  -UD      In Chair notified nsg;reclined;sitting;call light within reach;exit alarm on;with family/caregiver;legs elevated  -UD      Recorded by [UD] Bri Potter PTA        User Key  (r) = Recorded By, (t) = Taken By, (c) = Cosigned By    Initials Name Effective Dates    UD Bri Potter PTA 06/22/15 -                 IP PT  Goals       02/06/18 1017 02/05/18 0845       Bed Mobility PT LTG    Bed Mobility PT LTG, Date Established  02/05/18  -LR     Bed Mobility PT LTG, Time to Achieve  5 days  -LR     Bed Mobility PT LTG, Activity Type  supine to sit/sit to supine  -LR     Bed Mobility PT LTG, [place] Level  minimum assist (75% patient effort)  -LR     Bed Mobility PT LTG, Date Goal Reviewed  02/05/18  -LR     Bed Mobility PT LTG, Outcome goal ongoing  -UD goal ongoing  -LR     Transfer Training PT LTG    Transfer Training PT LTG, Date Established  02/05/18  -LR     Transfer Training PT LTG, Time to Achieve  5 days  -LR     Transfer Training PT LTG, Activity Type  bed to chair /chair to bed  -LR     Transfer Training PT LTG, [place] Level  minimum assist (75% patient effort)  -LR     Transfer Training PT LTG, Assist Device  other (see comments)   appropriate AD  -LR     Transfer Training PT  LTG, Date Goal Reviewed  02/05/18  -LR     Transfer Training PT LTG, Outcome goal ongoing  -UD goal ongoing  -LR     Transfer Training 2 PT LTG    Transfer Training PT 2 LTG, Date Established  02/05/18  -LR     Transfer Training PT 2 LTG, Time to Achieve  5 days  -LR     Transfer Training PT 2 LTG, Activity Type  sit to stand/stand to sit  -LR     Transfer Training PT 2 LTG, [place] Level  minimum assist (75% patient effort)  -LR     Transfer Training PT 2 LTG, Assist Device  other (see comments)   appropriate AD  -LR     Transfer Training PT 2 LTG, Date Goal Reviewed  02/05/18  -LR     Transfer Training PT 2 LTG, Outcome goal ongoing  -UD goal ongoing  -LR     Wheelchair Propulsion PT LTG    Wheelchair Propulsion Goal PT LTG, Date Established  02/05/18  -LR     Wheelchair Propulsion Goal PT LTG, Time to Achieve  5 days  -LR     Wheelchair Propulsion Goal PT LTG, [place] Level  supervision required  -LR     Wheelchair Propulsion Goal PT LTG, Distance to Achieve  50 feet  -LR     Wheelchair Propulsion Goal PT LTG, Date Goal  Reviewed  02/05/18  -LR     Wheelchair Propulsion Goal PT LTG, Outcome goal ongoing  -UD goal ongoing  -LR       User Key  (r) = Recorded By, (t) = Taken By, (c) = Cosigned By    Initials Name Provider Type    UD Bri Potter, PTA Physical Therapy Assistant    LR Kari Diaz, PT Physical Therapist          Physical Therapy Education     Title: PT OT SLP Therapies (Done)     Topic: Physical Therapy (Done)     Point: Mobility training (Done)    Learning Progress Summary    Learner Readiness Method Response Comment Documented by Status   Patient Acceptance E,D DU,NR  UD 02/06/18 1017 Done    Acceptance E VU  EM 02/06/18 0413 Done    Acceptance E,D VU,NR Educated on progression of POC, goals. Benefits of mobility. LR 02/05/18 0941 Done   Family Acceptance E,D DU,NR  UD 02/06/18 1017 Done    Acceptance E,D VU,NR Educated on progression of POC, goals. Benefits of mobility.  02/05/18 0941 Done               Point: Home exercise program (Done)    Learning Progress Summary    Learner Readiness Method Response Comment Documented by Status   Patient Acceptance E,D DU,NR  UD 02/06/18 1017 Done    Acceptance E VU  EM 02/06/18 0413 Done    Acceptance E,D VU,NR Educated on progression of POC, goals. Benefits of mobility. LR 02/05/18 0941 Done   Family Acceptance E,D DU,NR  UD 02/06/18 1017 Done    Acceptance E,D VU,NR Educated on progression of POC, goals. Benefits of mobility.  02/05/18 0941 Done               Point: Body mechanics (Done)    Learning Progress Summary    Learner Readiness Method Response Comment Documented by Status   Patient Acceptance E,D DU,NR  UD 02/06/18 1017 Done    Acceptance E VU  EM 02/06/18 0413 Done    Acceptance E,D VU,NR Educated on progression of POC, goals. Benefits of mobility.  02/05/18 0941 Done   Family Acceptance E,D DU,NR  UD 02/06/18 1017 Done    Acceptance E,D VU,NR Educated on progression of POC, goals. Benefits of mobility.  02/05/18 0941 Done               Point:  Precautions (Done)    Learning Progress Summary    Learner Readiness Method Response Comment Documented by Status   Patient Acceptance E,D DU,NR  UD 02/06/18 1017 Done    Acceptance E VU  EM 02/06/18 0413 Done    Acceptance E,D VU,NR Educated on progression of POC, goals. Benefits of mobility.  02/05/18 0941 Done   Family Acceptance E,D DU,NR  UD 02/06/18 1017 Done    Acceptance E,D VU,NR Educated on progression of POC, goals. Benefits of mobility.  02/05/18 0941 Done                      User Key     Initials Effective Dates Name Provider Type Discipline     06/22/15 -  Bri Potter, PTA Physical Therapy Assistant PT    LR 06/19/15 -  Kari Diaz, PT Physical Therapist PT    EM 06/12/17 -  Cammie Ruiz, RN Registered Nurse Nurse                    PT Recommendation and Plan  Anticipated Equipment Needs At Discharge:  (none)  Anticipated Discharge Disposition: home with assist, home with home health, other (see comments) (depending on progress, may need rehab)  Planned Therapy Interventions: balance training, bed mobility training, home exercise program, patient/family education, ROM (Range of Motion), strengthening, transfer training, wheelchair management/propulsion training  PT Frequency: daily  Plan of Care Review  Plan Of Care Reviewed With: patient, family  Progress: no change  Outcome Summary/Follow up Plan: pt limited by pain lt chest and old cva rt.needs max assist for transfers and pivot transfer,did some exercises in chair          Outcome Measures       02/06/18 0930 02/05/18 0845       How much help from another person do you currently need...    Turning from your back to your side while in flat bed without using bedrails? 1  -UD 2  -LR     Moving from lying on back to sitting on the side of a flat bed without bedrails? 2  -UD 2  -LR     Moving to and from a bed to a chair (including a wheelchair)? 2  -UD 2  -LR     Standing up from a chair using your arms (e.g., wheelchair, bedside  chair)? 2  -UD 2  -LR     Climbing 3-5 steps with a railing? 1  -UD 1  -LR     To walk in hospital room? 1  -UD 1  -LR     AM-PAC 6 Clicks Score 9  -UD 10  -LR     Functional Assessment    Outcome Measure Options  AM-PAC 6 Clicks Basic Mobility (PT)  -LR       User Key  (r) = Recorded By, (t) = Taken By, (c) = Cosigned By    Initials Name Provider Type    UD Bri Potter PTA Physical Therapy Assistant    LR Kari Diaz, PT Physical Therapist           Time Calculation:         PT Charges       02/06/18 1019          Time Calculation    PT Received On 02/06/18  -UD      PT Goal Re-Cert Due Date 02/15/18  -UD      Time Calculation- PT    Total Timed Code Minutes- PT 20 minute(s)  -UD        User Key  (r) = Recorded By, (t) = Taken By, (c) = Cosigned By    Initials Name Provider Type    PANTERA Potter PTA Physical Therapy Assistant          Therapy Charges for Today     Code Description Service Date Service Provider Modifiers Qty    68319539095 HC PT THER PROC EA 15 MIN 2/6/2018 Bri Potter PTA GP 1    68431787971 HC PT THER SUPP EA 15 MIN 2/6/2018 Bri Potter PTA GP 1          PT G-Codes  PT Professional Judgement Used?: Yes  Outcome Measure Options: AM-PAC 6 Clicks Daily Activity (OT)  Score: 10  Functional Limitation: Mobility: Walking and moving around  Mobility: Walking and Moving Around Current Status (): At least 60 percent but less than 80 percent impaired, limited or restricted  Mobility: Walking and Moving Around Goal Status (): At least 40 percent but less than 60 percent impaired, limited or restricted    Bri Potter PTA  2/6/2018          Electronically signed by Bri Potter PTA at 2/6/2018 10:20 AM

## 2018-02-07 NOTE — PROGRESS NOTES
"Pharmacy Consult  -  Warfarin    Kristin Magdaleno is an 84 yo woman admitted 2/4/18 after a fall at home, sustaining multiple closed L rib fractures with development of subcutaneous edema. PMH significant for HTN, hyperlipidemia, non-insulin dependent DMII and CAD s/p CABG with intraoperative atrial fibrillation/CVA with residual R hemiparesis.  She is maintained on warfarin therapy, which was held upon admission due to fall with contusions.  CHADS VASC = 7 (11% annual risk of stroke) - HAS BLED = 2 (4.1% annual risk of bleeding).  Provider notes that risks of anticoagulation discussed with patient and , they would like to resume warfarin. Pharmacy to dose while in house.     Height - 177.8 cm (70\")  Weight - 55.1 kg (121 lb 8 oz)     Consulting Provider: - ISAEL Barrios  Indication: - Afib  Goal INR:  2-3  Home Regimen:                        - Warfarin 4 mg po on Mon, Tues, Wed, Thurs, Fri and Sat                        - Warfarin 3 mg po on Sundays only     Bridge Therapy: No     Drug-Drug Interactions with current regimen: None at present     Warfarin Dosing During Admission:     Date  2/5 2/6 2/7               INR  1.86  1.33  1.48               Dose  4 mg  6 mg  6mg                  Education Provided:  Pending     Labs:  Results from last 7 days   Lab Units 02/07/18  0554 02/06/18  0737 02/05/18  0601 02/04/18  1138   INR  1.48 1.33 1.86 2.49   HEMOGLOBIN g/dL --  10.5* 10.0* 11.3*   HEMATOCRIT % --  32.9* 31.8* 37.2   PLATELETS 10*3/mm3 --  335 339 377   Current dietary intake: Regular diet with Boost HP. Consumed 50% of meals yesterday,     Assessment/Plan:   1. Give an extra 2mg today for a total of 6mg of warfarin.  2. Monitor s/s of bleeding  [facility]  2/7/2018  8:12 AM    2.          "

## 2018-02-07 NOTE — PROGRESS NOTES
Psychiatric Medicine Services  PROGRESS NOTE    Patient Name: Kristin Magdaleno  : 1935  MRN: [id number]    Date of Admission: 2018  Length of Stay: 0  Primary Care Physician: Maryann Guillaume MD    Subjective     CC: follow up L rib fractures    HPI:  Up in Bed.  Her pain is controlled with scheduled Norco.  No fever.  No cough.  No nausea or vomiting.  She has constipation without abdominal pain or obstipation.  She has pain with deep inspiration on the left side at the site of the rib fractures     Review of Systems  Gen- No fevers, chills  CV- No chest pain, palpitations  Resp- No cough, dyspnea  GI- No N/V/D, abd pain  Musk- L rib pain, worse with inspiration/movement    Otherwise ROS is negative except as mentioned in the HPI.    Objective     Vital Signs:   Temp:  [97.4 °F (36.3 °C)-98.5 °F (36.9 °C)] 97.5 °F (36.4 °C)  Heart Rate:  [] 105  Resp:  [16] 16  BP: (113-151)/() 151/60        Physical Exam:  Constitutional: Thin, elderly female. NAD. Awake, alert, pleasant and conversant.   HENT: NCAT, mucous membranes moist  Respiratory: Appropriate respiratory effort.  No wheezing, rales or rhonchi.  Decreased breath sounds left base  Cardiovascular: RRR, no murmurs, rubs, or gallops, palpable pedal pulses bilaterally  Gastrointestinal: Positive bowel sounds, soft, nontender, nondistended  Musculoskeletal: No bilateral ankle edema  Psychiatric: Appropriate affect, cooperative  Neurologic: Oriented x 3, speech is appropriate.  Cranial nerves II 12 are intact.  Left-sided hemiparesis    Results Reviewed:  I have personally reviewed current lab, radiology, and data and agree.      Results from last 7 days  Lab Units 18  0554 18  0737 18  0601 18  1138   WBC 10*3/mm3  --  9.89 9.71 13.90*   HEMOGLOBIN g/dL  --  10.5* 10.0* 11.3*   HEMATOCRIT %  --  32.9* 31.8* 37.2   PLATELETS 10*3/mm3  --  335 339 377   INR  1.48 1.33 1.86 2.49       Results  from last 7 days  Lab Units 02/05/18  0601 02/04/18  1138   SODIUM mmol/L 138 143   POTASSIUM mmol/L 4.0 4.2   CHLORIDE mmol/L 104 107   CO2 mmol/L 31.0 31.0   BUN mg/dL 16 24*   CREATININE mg/dL 0.60 0.80   GLUCOSE mg/dL 91 114*   CALCIUM mg/dL 8.4* 9.0   ALT (SGPT) U/L  --  15   AST (SGOT) U/L  --  18     Estimated Creatinine Clearance: 48 mL/min (by C-G formula based on Cr of 0.6).  No results found for: BNP  No results found for: PHART    Microbiology Results Abnormal     None        Imaging Results (last 24 hours)     ** No results found for the last 24 hours. **        I have reviewed the medications.    Assessment / Plan     Hospital Problem List     * (Principal)Multiple closed fractures of ribs of left side    Essential hypertension, benign    Pure hypercholesterolemia    Diabetes mellitus without complication    Overview Signed 7/20/2016  7:09 PM by Vick Diaz     Type 2 or unspecified, not stated as uncontrolled.         CVA (cerebral vascular accident)    Overview Signed 2/4/2018  3:38 PM by JAY Ellison     With Rt hemiparesis x 20+ years         Atrial fibrillation    Rheumatic heart disease    Overview Signed 9/7/2017  8:29 AM by Maryann Guillaume MD     as a child, heart murmur, no recent echos (pt. declines)         Heart murmur (Chronic)    Overview Signed 2/4/2018  3:35 PM by JAY Ellison     RHF as child.  no recent echos (pt declines)         Subcutaneous emphysema    Overview Signed 2/4/2018  3:36 PM by JAY Ellison     Acute s/p fall         Contusion of left lower extremity    Renal cyst        Brief Hospital Course to date:  Kristin Magdaleno is a 83 y.o. female with PMH significant for HTN, hyperlipidemia, non-insulin dependent DMII, Coronary artery disease with prior history of coronary artery bypass grafting with intraoperative atrial fibrillation complicated by ischemic CVA with residual and prior CVA with left-sided hemiparesis (20+  years ago). She presented to [facility] ED on 2/4/18 after a fall at home, sustaining multiple closed L rib fractures with development of subcutaneous edema.     9th and 10th L rib fractures  LLE contusion  Fall at home  - Continue PT/OT, case management following for possible rehab options at discharge  - Pain control PRN  - Incentive spirometry - teaching and encouragement. High risk of developing subsequent pneumonia   - No LLE fracture on radiology, mild medial ankle effusion appears stable     Trace intrapleural air and subcutaneous emphysema  - No evidence of pneumothorax or pleural effusion  - Continue to monitor  - 2/6 CXR appears stable, some increased markings along L lung base representing atelectasis from poor inspiratory effort.  Patient encouraged to use incentive spirometry    Non-insulin dependent DMII  - Continue accuchecks and SSI  - Using little to no insulin  - Hgb A1c 5.9%     Chronic coccygeal decubitus ulcer  - WOC to evaluate     History of CAD s/p CABG stable no anginal symptoms  Intraoperative atrial fibrillation/CVA with residual left sided hemiparesis  - Maintained on Coumadin therapy  - Coumadin held upon admission due to fall with contusions  - Hemoglobin appears stable   - CHADS VASC = 7 (11% annual risk of stroke) - HAS BLED = 2 (4.1% annual risk of bleeding)   - Discussed with patient and , they would like to resume Coumadin.   - Pharmacy dosing. AM INR and CBC, no signs of blood loss    Hypothyroidism   - TSH elevated, however T4 normal  - Patient/family previously opted to stop Synthroid with guidance from PCP     Hypertension, suboptimally controlled.  Increase metoprolol succinate to 50 mg daily  Hyperlipidemia, statin     Narcotic induced constipation.  Continue MiraLAX.  Add Dulcolax    DVT Prophylaxis:  Mechanical  CODE STATUS: Full Code    Disposition: I expect the patient to be discharged to rehabilitation facility once bed is available.  She is  medically clear    Justin Doyle MD  02/07/18  11:24 AM

## 2018-02-07 NOTE — PLAN OF CARE
Problem: Patient Care Overview (Adult)  Goal: Plan of Care Review  Outcome: Ongoing (interventions implemented as appropriate)   02/07/18 0248   Coping/Psychosocial Response Interventions   Plan Of Care Reviewed With patient   Patient Care Overview   Progress no change       Problem: Fall Risk (Adult)  Goal: Identify Related Risk Factors and Signs and Symptoms  Outcome: Ongoing (interventions implemented as appropriate)    Goal: Absence of Falls  Outcome: Ongoing (interventions implemented as appropriate)      Problem: Pressure Ulcer Risk (Grey Scale) (Adult,Obstetrics,Pediatric)  Goal: Identify Related Risk Factors and Signs and Symptoms  Outcome: Ongoing (interventions implemented as appropriate)    Goal: Skin Integrity  Outcome: Ongoing (interventions implemented as appropriate)

## 2018-02-08 LAB
GLUCOSE BLDC GLUCOMTR-MCNC: 133 MG/DL (ref 70–130)
GLUCOSE BLDC GLUCOMTR-MCNC: 134 MG/DL (ref 70–130)
GLUCOSE BLDC GLUCOMTR-MCNC: 155 MG/DL (ref 70–130)
GLUCOSE BLDC GLUCOMTR-MCNC: 245 MG/DL (ref 70–130)
INR PPP: 2.23
PROTHROMBIN TIME: 24.9 SECONDS (ref 9.6–11.5)

## 2018-02-08 PROCEDURE — 85610 PROTHROMBIN TIME: CPT

## 2018-02-08 PROCEDURE — 99231 SBSQ HOSP IP/OBS SF/LOW 25: CPT | Performed by: NURSE PRACTITIONER

## 2018-02-08 PROCEDURE — 94799 UNLISTED PULMONARY SVC/PX: CPT

## 2018-02-08 PROCEDURE — 97110 THERAPEUTIC EXERCISES: CPT

## 2018-02-08 PROCEDURE — 82962 GLUCOSE BLOOD TEST: CPT

## 2018-02-08 RX ORDER — MAGNESIUM CARB/ALUMINUM HYDROX 105-160MG
296 TABLET,CHEWABLE ORAL ONCE
Status: COMPLETED | OUTPATIENT
Start: 2018-02-08 | End: 2018-02-08

## 2018-02-08 RX ORDER — SENNOSIDES 8.6 MG
2 TABLET ORAL 2 TIMES DAILY
Status: DISCONTINUED | OUTPATIENT
Start: 2018-02-08 | End: 2018-02-10 | Stop reason: HOSPADM

## 2018-02-08 RX ADMIN — Medication 296 ML: at 18:14

## 2018-02-08 RX ADMIN — POLYETHYLENE GLYCOL 3350 17 G: 17 POWDER, FOR SOLUTION ORAL at 08:23

## 2018-02-08 RX ADMIN — HYDROCODONE BITARTATE AND ACETAMINOPHEN 1 TABLET: 5; 325 TABLET ORAL at 10:18

## 2018-02-08 RX ADMIN — DIGOXIN 250 MCG: 250 TABLET ORAL at 08:26

## 2018-02-08 RX ADMIN — MULTIPLE VITAMINS W/ MINERALS TAB 1 TABLET: TAB ORAL at 08:24

## 2018-02-08 RX ADMIN — LOSARTAN POTASSIUM AND HYDROCHLOROTHIAZIDE 2 TABLET: 12.5; 5 TABLET ORAL at 08:23

## 2018-02-08 RX ADMIN — INSULIN LISPRO 3 UNITS: 100 INJECTION, SOLUTION INTRAVENOUS; SUBCUTANEOUS at 21:00

## 2018-02-08 RX ADMIN — HYDROCODONE BITARTATE AND ACETAMINOPHEN 1 TABLET: 5; 325 TABLET ORAL at 02:39

## 2018-02-08 RX ADMIN — WARFARIN SODIUM 4 MG: 4 TABLET ORAL at 17:18

## 2018-02-08 RX ADMIN — TRAZODONE HYDROCHLORIDE 50 MG: 50 TABLET ORAL at 20:59

## 2018-02-08 RX ADMIN — METOPROLOL SUCCINATE 50 MG: 50 TABLET, EXTENDED RELEASE ORAL at 08:23

## 2018-02-08 RX ADMIN — HYDROCODONE BITARTATE AND ACETAMINOPHEN 1 TABLET: 5; 325 TABLET ORAL at 21:00

## 2018-02-08 RX ADMIN — ATORVASTATIN CALCIUM 40 MG: 40 TABLET, FILM COATED ORAL at 08:23

## 2018-02-08 RX ADMIN — SENNOSIDES 17.2 MG: 8.6 TABLET, FILM COATED ORAL at 20:59

## 2018-02-08 RX ADMIN — POLYETHYLENE GLYCOL 3350 17 G: 17 POWDER, FOR SOLUTION ORAL at 20:59

## 2018-02-08 RX ADMIN — CASTOR OIL AND BALSAM, PERU: 788; 87 OINTMENT TOPICAL at 20:59

## 2018-02-08 RX ADMIN — HYDROCODONE BITARTATE AND ACETAMINOPHEN 1 TABLET: 5; 325 TABLET ORAL at 17:18

## 2018-02-08 RX ADMIN — HYDROCODONE BITARTATE AND ACETAMINOPHEN 1 TABLET: 5; 325 TABLET ORAL at 06:00

## 2018-02-08 RX ADMIN — INSULIN LISPRO 2 UNITS: 100 INJECTION, SOLUTION INTRAVENOUS; SUBCUTANEOUS at 11:41

## 2018-02-08 RX ADMIN — HYDROCODONE BITARTATE AND ACETAMINOPHEN 1 TABLET: 5; 325 TABLET ORAL at 14:13

## 2018-02-08 RX ADMIN — CASTOR OIL AND BALSAM, PERU: 788; 87 OINTMENT TOPICAL at 08:24

## 2018-02-08 NOTE — PLAN OF CARE
Problem: Patient Care Overview (Adult)  Goal: Plan of Care Review  Outcome: Ongoing (interventions implemented as appropriate)   02/08/18 0253   Patient Care Overview   Progress progress toward functional goals as expected   Outcome Evaluation   Outcome Summary/Follow up Plan Pt VSS, still complains of pain from ribs. 1 L NC during sleep, no new complaints.        Problem: Fall Risk (Adult)  Goal: Identify Related Risk Factors and Signs and Symptoms  Outcome: Ongoing (interventions implemented as appropriate)    Goal: Absence of Falls  Outcome: Ongoing (interventions implemented as appropriate)      Problem: Pressure Ulcer Risk (Grey Scale) (Adult,Obstetrics,Pediatric)  Goal: Identify Related Risk Factors and Signs and Symptoms  Outcome: Ongoing (interventions implemented as appropriate)    Goal: Skin Integrity  Outcome: Ongoing (interventions implemented as appropriate)

## 2018-02-08 NOTE — PROGRESS NOTES
Continued Stay Note  [facility]     Patient Name: Kristin Magdaleno  MRN: [id number]  Today's Date: 2/8/2018    Admit Date: 2/4/2018          Discharge Plan       02/08/18 1632    Case Management/Social Work Plan    Plan [facility]    Patient/Family In Agreement With Plan yes    Additional Comments Per patient's daughter, Sara, they would like to accept the bed at [facility] on the Skilled Rehab Unit. Notified Dacia, and patient will have a bed on Saturday, 2/10/18. Ambulance scheduled with Chirag at [facility] [place] for Saturday at 1100 (conf. # [id number]). CM will fax the DC summary when available. RN, please call report to [phone], and please send a copy of the DC summary/AVS in the discharge packet. Thank you.       02/08/18 1508    Case Management/Social Work Plan    Plan SNF    Patient/Family In Agreement With Plan yes    Additional Comments Per Chasidy with [facility] and Dacia with [facility], each facility is able to offer patient a bed in a semi-private room for Saturday, 2/10/18. Spoke with patient's daughter, Sara, and she would like to confer with her sister about which facility to choose. They would prefer a private room somewhere, but none of the facilties they have selected has a private room available this week. CM will continue to follow.               Discharge Codes     None        Expected Discharge Date and Time     Expected Discharge Date Expected Discharge Time    Feb 10, 2018             Kristin Gant

## 2018-02-08 NOTE — PROGRESS NOTES
"Adult Nutrition  Assessment/PES    Patient Name:  Kristin Magdaleno  YOB: 1935  MRN: [id number]  Admit Date:  2/4/2018    Assessment Date:  2/8/2018    Comments:  Pt intake 38%/6 meals. Pt states she is taking \"some\" supplement.  Offered to change supplement for better intake, pt declined change. Will continue to follow.          Reason for Assessment       02/08/18 1133    Reason for Assessment    Reason For Assessment/Visit follow up protocol    Time Spent (min) 20    Diagnosis --   Per notes this admission.              Nutrition/Diet History       02/08/18 1133    Nutrition/Diet History    Reported/Observed By Patient    Appetite Fair    Food Habit/Preferences Uses Supplements   Drinking some of supplement              Labs/Tests/Procedures/Meds       02/08/18 1134    Labs/Tests/Procedures/Meds    Labs/Tests Review Reviewed                Nutrition Prescription Ordered       02/08/18 1134    Nutrition Prescription PO    Current PO Diet Regular    Supplement Boost HP    Supplement Frequency 2 times a day            Evaluation of Received Nutrient/Fluid Intake       02/08/18 1153    PO Evaluation    % PO Intake 38      02/08/18 1134    PO Evaluation    Number of Meals 6            Problem/Interventions:          Problem 2       02/08/18 1201    Nutrition Diagnoses Problem 2    Problem 2 Inadequate Intake/Infusion    Inadequate Intake Type Oral    Etiology (related to) Other (comment)   clinical condition    Signs/Symptoms (evidenced by) PO Intake    Percent (%) intake recorded 38 %    Over number of meals 6                  Intervention Goal       02/08/18 1201    Intervention Goal    General Nutrition support treatment    PO Increase intake            Nutrition Intervention       02/08/18 1201    Nutrition Intervention    RD/Tech Action Encourage intake;Follow Tx progress;Care plan reviewd;Advise alternate selection              Education/Evaluation       02/08/18 1202    Monitor/Evaluation    " Monitor Per protocol;PO intake;Supplement intake;Skin status        Electronically signed by:  Ester Arguelles  02/08/18 12:02 PM

## 2018-02-08 NOTE — PLAN OF CARE
Problem: Patient Care Overview (Adult)  Goal: Plan of Care Review  Outcome: Ongoing (interventions implemented as appropriate)   02/08/18 1450   Coping/Psychosocial Response Interventions   Plan Of Care Reviewed With patient   Patient Care Overview   Progress progress toward functional goals as expected   Outcome Evaluation   Outcome Summary/Follow up Plan Pt STS x2 from chair with modA x2. Pt has increased difficulty maintaining upright posture during stand. Pt attempted to take step with LLE, but unable to weight shift onto RLE. Continue to progress as appropriate.        Problem: Inpatient Physical Therapy  Goal: Bed Mobility Goal LTG- PT  Outcome: Ongoing (interventions implemented as appropriate)   02/05/18 0845 02/08/18 1450   Bed Mobility PT LTG   Bed Mobility PT LTG, Date Established 02/05/18 --    Bed Mobility PT LTG, Time to Achieve 5 days --    Bed Mobility PT LTG, Activity Type supine to sit/sit to supine --    Bed Mobility PT LTG, [place] Level minimum assist (75% patient effort) --    Bed Mobility PT LTG, Date Goal Reviewed 02/05/18 --    Bed Mobility PT LTG, Outcome --  goal ongoing     Goal: Transfer Training Goal 1 LTG- PT  Outcome: Ongoing (interventions implemented as appropriate)   02/05/18 0845 02/08/18 1450   Transfer Training PT LTG   Transfer Training PT LTG, Date Established 02/05/18 --    Transfer Training PT LTG, Time to Achieve 5 days --    Transfer Training PT LTG, Activity Type bed to chair /chair to bed --    Transfer Training PT LTG, [place] Level minimum assist (75% patient effort) --    Transfer Training PT LTG, Assist Device other (see comments)  (appropriate AD) --    Transfer Training PT LTG, Date Goal Reviewed 02/05/18 --    Transfer Training PT LTG, Outcome --  goal ongoing     Goal: Transfer Training Goal 2 LTG- PT  Outcome: Ongoing (interventions implemented as appropriate)   02/05/18 0845 02/08/18 1450   Transfer Training 2 PT LTG   Transfer Training PT 2 LTG, Date  Established 02/05/18 --    Transfer Training PT 2 LTG, Time to Achieve 5 days --    Transfer Training PT 2 LTG, Activity Type sit to stand/stand to sit --    Transfer Training PT 2 LTG, [place] Level minimum assist (75% patient effort) --    Transfer Training PT 2 LTG, Assist Device other (see comments)  (appropriate AD) --    Transfer Training PT 2 LTG, Date Goal Reviewed 02/05/18 --    Transfer Training PT 2 LTG, Outcome --  goal ongoing     Goal: Wheelchair Propulsion Goal LTG- PT  Outcome: Ongoing (interventions implemented as appropriate)   02/05/18 0845 02/08/18 1450   Wheelchair Propulsion PT LTG   Wheelchair Propulsion Goal PT LTG, Date Established 02/05/18 --    Wheelchair Propulsion Goal PT LTG, Time to Achieve 5 days --    Wheelchair Propulsion Goal PT LTG, [place] Level supervision required --    Wheelchair Propulsion Goal PT LTG, Distance to Achieve 50 feet --    Wheelchair Propulsion Goal PT LTG, Date Goal Reviewed 02/05/18 --    Wheelchair Propulsion Goal PT LTG, Outcome --  goal ongoing

## 2018-02-08 NOTE — PROGRESS NOTES
Continued Stay Note  [facility]     Patient Name: Kristin Magdaleno  MRN: [id number]  Today's Date: 2/8/2018    Admit Date: 2/4/2018          Discharge Plan       02/08/18 1508    Case Management/Social Work Plan    Plan SNF    Patient/Family In Agreement With Plan yes    Additional Comments Per Chasidy with [facility] and Dacia with [facility], each facility is able to offer patient a bed in a semi-private room for Saturday, 2/10/18. Spoke with patient's daughter, Sara, and she would like to confer with her sister about which facility to choose. They would prefer a private room somewhere, but none of the facilties they have selected has a private room available this week. CM will continue to follow.               Discharge Codes     None        Expected Discharge Date and Time     Expected Discharge Date Expected Discharge Time    Feb 10, 2018             Kristin Gant

## 2018-02-08 NOTE — THERAPY TREATMENT NOTE
Acute Care - Physical Therapy Treatment Note  [facility]     Patient Name: Kristin Magdaleno  : 1935  MRN: [id number]  Today's Date: 2018  Onset of Illness/Injury or Date of Surgery Date: 18  Date of Referral to PT: 18  Referring Physician: JAY Wilcox    Admit Date: 2018    Visit Dx:    ICD-10-CM ICD-9-CM   1. Closed fracture of multiple ribs of left side, initial encounter S22.42XA 807.09   2. Contusion of left lower extremity, initial encounter S80.12XA 924.5   3. Fall, initial encounter W19.XXXA E888.9   4. Subcutaneous emphysema, initial encounter T79.7XXA 958.7   5. Impaired functional mobility, balance, gait, and endurance Z74.09 V49.89     Patient Active Problem List   Diagnosis   • Essential hypertension, benign   • Pure hypercholesterolemia   • Diabetes mellitus without complication   • Coronary atherosclerosis   • CVA (cerebral vascular accident)   • Atrial fibrillation   • Rheumatic heart disease   • Multiple closed fractures of ribs of left side   • Heart murmur   • Subcutaneous emphysema   • Contusion of left lower extremity   • Renal cyst               Adult Rehabilitation Note       18 1303 18 1534 18 0930    Rehab Assessment/Intervention    Discipline physical therapist  -KR physical therapist  -KR physical therapy assistant  -UD    Document Type therapy note (daily note)  -KR therapy note (daily note)  -KR therapy note (daily note)  -UD    Subjective Information agree to therapy;no complaints  -KR agree to therapy;no complaints  -KR agree to therapy;complains of;weakness;pain  -UD    Patient Effort, Rehab Treatment good  -KR good  -KR adequate  -UD    Symptoms Noted During/After Treatment fatigue  -KR increased pain;fatigue  -KR increased pain  -UD    Precautions/Limitations fall precautions;brace on when up   R LE brace; rib fxs on L  -KR fall precautions;brace on when up   R LE brace; rib fxs on L  -KR fall precautions  -UD    Specific  Treatment Considerations   --   old cva rt,brace rt leg  -UD    Recorded by [KR] Elvira Gil, PT [KR] Elvira Gil, PT [UD] Bri Potter, COSMO    Vital Signs    Pre Systolic BP Rehab 147  -  -KR     Pre Treatment Diastolic BP 65  -KR 61  -KR     Pretreatment Heart Rate (beats/min) 86  -KR 76  -KR     Posttreatment Heart Rate (beats/min) 81  -KR 75  -KR     Pre SpO2 (%)  89  -KR     O2 Delivery Pre Treatment room air  -KR room air  -KR     Post SpO2 (%)  91  -KR     O2 Delivery Post Treatment room air  -KR room air  -KR room air  -UD    Pre Patient Position Sitting  -KR Sitting  -KR Supine  -UD    Intra Patient Position Standing  -KR Standing  -KR Standing  -UD    Post Patient Position Sitting  -KR Sitting  -KR Sitting  -UD    Recorded by [KR] Elvira Gil, PT [KR] Elvira Gil, PT [UD] Bri Potter, COSMO    Pain Assessment    Pain Assessment 0-10  -KR 0-10  -KR Sanchez-Baker FACES  -UD    Sanchez-Bustillo FACES Pain Rating   6  -UD    Pain Score 0  -KR 0  -KR     Post Pain Score 0  -KR 4  -KR     Pain Type  Acute pain  -KR Acute pain  -UD    Pain Location  Rib cage  -KR Chest  -UD    Pain Orientation  Left  -KR Left  -UD    Pain Intervention(s)  Repositioned  -KR     Response to Interventions  tolerated  -KR     Recorded by [KR] Elvira Gil, PT [KR] Elvira Gil, PT [UD] Bri Potter, COSMO    Cognitive Assessment/Intervention    Current Cognitive/Communication Assessment impaired  -KR impaired  -KR     Orientation Status oriented to;person;time  -KR oriented to;person;time  -KR oriented to;person;situation  -UD    Follows Commands/Answers Questions able to follow single-step instructions;100% of the time;needs cueing;needs increased time  -KR able to follow single-step instructions;100% of the time;needs cueing;needs increased time  -KR     Personal Safety WNL/WFL  -KR WNL/WFL  -KR     Personal Safety Interventions fall prevention program maintained;gait belt;nonskid shoes/slippers when out of bed  -KR fall  prevention program maintained;gait belt;nonskid shoes/slippers when out of bed  -KR fall prevention program maintained  -UD    Recorded by [KR] Elvira Gil, PT [KR] Elvira Gil, PT [UD] Bri Potter PTA    Bed Mobility, Assessment/Treatment    Bed Mob, Supine to Sit, [place] not tested  -KR not tested  -KR maximum assist (25% patient effort);2 person assist required  -UD    Bed Mob, Sit to Supine, [place] not tested  -KR not tested  -KR     Bed Mobility, Comment UIC  -KR UIC  -KR     Recorded by [KR] Elvira Gil, PT [KR] Elvira Gil, PT [UD] Bri Potter PTA    Transfer Assessment/Treatment    Transfers, Bed-Chair [place]   maximum assist (25% patient effort);2 person assist required  -UD    Transfers, Sit-Stand [place] moderate assist (50% patient effort);2 person assist required;verbal cues required  -KR moderate assist (50% patient effort);2 person assist required;verbal cues required  -KR maximum assist (25% patient effort);2 person assist required  -UD    Transfers, Stand-Sit [place] moderate assist (50% patient effort);2 person assist required;verbal cues required  -KR moderate assist (50% patient effort);2 person assist required;verbal cues required  -KR maximum assist (25% patient effort);2 person assist required  -UD    Transfers, Sit-Stand-Sit, Assist Device --   B UE support  -KR      Transfer, Safety Issues step length decreased;weight-shifting ability decreased;sequencing ability decreased  -KR step length decreased;weight-shifting ability decreased;sequencing ability decreased  -KR     Transfer, Impairments ROM decreased;strength decreased;impaired balance;postural control impaired  -KR strength decreased;ROM decreased;impaired balance;pain;postural control impaired  -KR     Transfer, Comment Pt STS x2 from chair. Pt required VC's to push up from chair; increased difficulty maintaining upright posture. Pt attempted to take step with LLE, but unable to complete.    -KR STS x4 from chair. VC's to push up from chair and for upright posture. Pt c/o increased pain in her ribs during transfer. Pt able to take one step forward and backward with LLE, but fatigued quickly; unable to take additional steps.   -KR     Recorded by [KR] Elvira Gil, PT [KR] Elvira Gil, PT [UD] Bri Potter, PTA    Gait Assessment/Treatment    Gait, [place] Level not appropriate to assess  -KR not appropriate to assess  -KR other (see comments)   pivot to chair  -UD    Gait, Comment  Pt non-ambulatory at baseline.  -KR     Recorded by [KR] Elvira Gil, PT [KR] Elvira Gil, PT [UD] Bri Potter, PTA    Motor Skills/Interventions    Additional Documentation Balance Skills Training (Group)  -KR Balance Skills Training (Group)  -KR     Recorded by [GAGE] Elvira Gil, PT [KR] Elvira Gil, PT     Balance Skills Training    Sitting-Level of Assistance Minimum assistance  -KR Minimum assistance  -KR     Sitting-Balance Support Left upper extremity supported;Feet supported  -KR Left upper extremity supported;Feet supported  -KR     Standing-Level of Assistance Moderate assistance;x2  -KR Moderate assistance;x2  -KR     Static Standing Balance Support Right upper extremity supported;Left upper extremity supported  -KR Right upper extremity supported;Left upper extremity supported  -KR     Recorded by [GAGE] Elvira Gil, PT [KR] Elvira Gil, PT     Therapy Exercises    Right Lower Extremity PROM:;10 reps;sitting;ankle pumps/circles;heel slides;SLR  -KR      Left Lower Extremity AROM:;10 reps;sitting;ankle pumps/circles;heel slides;SLR  -KR      Recorded by [KR] Elvira Gil, PT      Positioning and Restraints    Pre-Treatment Position sitting in chair/recliner  -KR sitting in chair/recliner  -KR in bed  -UD    Post Treatment Position chair  -KR chair  -KR chair  -UD    In Chair notified nsg;reclined;call light within reach;encouraged to call for assist;exit alarm on;RUE elevated;LUE  elevated;legs elevated  -KR reclined;call light within reach;encouraged to call for assist;exit alarm on;waffle cushion;on mechanical lift sling;legs elevated  -KR notified nsg;reclined;sitting;call light within reach;exit alarm on;with family/caregiver;legs elevated  -UD    Recorded by [KR] Elvira Gil, PT [KR] Elvira HART Ring, PT [UD] Bri Potter, PTA      User Key  (r) = Recorded By, (t) = Taken By, (c) = Cosigned By    Initials Name Effective Dates    UD Bri Potter, PTA 06/22/15 -     KR Elvira Gil, PT 09/25/17 -                 IP PT Goals       02/08/18 1450 02/07/18 1607 02/06/18 1017    Bed Mobility PT LTG    Bed Mobility PT LTG, Outcome goal ongoing  -KR goal ongoing  -KR goal ongoing  -UD    Transfer Training PT LTG    Transfer Training PT LTG, Outcome goal ongoing  -KR goal ongoing  -KR goal ongoing  -UD    Transfer Training 2 PT LTG    Transfer Training PT 2 LTG, Outcome goal ongoing  -KR goal ongoing  -KR goal ongoing  -UD    Wheelchair Propulsion PT LTG    Wheelchair Propulsion Goal PT LTG, Outcome goal ongoing  -KR goal ongoing  -KR goal ongoing  -UD      02/05/18 0845          Bed Mobility PT LTG    Bed Mobility PT LTG, Date Established 02/05/18  -LR      Bed Mobility PT LTG, Time to Achieve 5 days  -LR      Bed Mobility PT LTG, Activity Type supine to sit/sit to supine  -LR      Bed Mobility PT LTG, [place] Level minimum assist (75% patient effort)  -LR      Bed Mobility PT LTG, Date Goal Reviewed 02/05/18  -LR      Bed Mobility PT LTG, Outcome goal ongoing  -LR      Transfer Training PT LTG    Transfer Training PT LTG, Date Established 02/05/18  -LR      Transfer Training PT LTG, Time to Achieve 5 days  -LR      Transfer Training PT LTG, Activity Type bed to chair /chair to bed  -LR      Transfer Training PT LTG, [place] Level minimum assist (75% patient effort)  -LR      Transfer Training PT LTG, Assist Device other (see comments)   appropriate AD  -LR      Transfer Training PT   LTG, Date Goal Reviewed 02/05/18  -LR      Transfer Training PT LTG, Outcome goal ongoing  -LR      Transfer Training 2 PT LTG    Transfer Training PT 2 LTG, Date Established 02/05/18  -LR      Transfer Training PT 2 LTG, Time to Achieve 5 days  -LR      Transfer Training PT 2 LTG, Activity Type sit to stand/stand to sit  -LR      Transfer Training PT 2 LTG, [place] Level minimum assist (75% patient effort)  -LR      Transfer Training PT 2 LTG, Assist Device other (see comments)   appropriate AD  -LR      Transfer Training PT 2 LTG, Date Goal Reviewed 02/05/18  -LR      Transfer Training PT 2 LTG, Outcome goal ongoing  -LR      Wheelchair Propulsion PT LTG    Wheelchair Propulsion Goal PT LTG, Date Established 02/05/18  -LR      Wheelchair Propulsion Goal PT LTG, Time to Achieve 5 days  -LR      Wheelchair Propulsion Goal PT LTG, [place] Level supervision required  -LR      Wheelchair Propulsion Goal PT LTG, Distance to Achieve 50 feet  -LR      Wheelchair Propulsion Goal PT LTG, Date Goal Reviewed 02/05/18  -LR      Wheelchair Propulsion Goal PT LTG, Outcome goal ongoing  -LR        User Key  (r) = Recorded By, (t) = Taken By, (c) = Cosigned By    Initials Name Provider Type    UD Bri Potter, PTA Physical Therapy Assistant    LR Kari Diaz, PT Physical Therapist    GAGE Gil, PT Physical Therapist          Physical Therapy Education     Title: PT OT SLP Therapies (Active)     Topic: Physical Therapy (Active)     Point: Mobility training (Active)    Learning Progress Summary    Learner Readiness Method Response Comment Documented by Status   Patient Acceptance E NR  KR 02/08/18 1450 Active    Acceptance E VU  KS 02/08/18 1023 Done    Acceptance E NR  KR 02/07/18 1607 Active    Acceptance E,D MIRELA,NR  UD 02/06/18 1017 Done    Acceptance E VU  EM 02/06/18 0413 Done    Acceptance E,D VU,NR Educated on progression of POC, goals. Benefits of mobility. LR 02/05/18 0941 Done   Family Acceptance  E VU  KS 02/08/18 1023 Done    Acceptance E,D DU,NR  UD 02/06/18 1017 Done    Acceptance E,D VU,NR Educated on progression of POC, goals. Benefits of mobility.  02/05/18 0941 Done               Point: Home exercise program (Active)    Learning Progress Summary    Learner Readiness Method Response Comment Documented by Status   Patient Acceptance E NR  KR 02/08/18 1450 Active    Acceptance E VU  KS 02/08/18 1023 Done    Acceptance E,D DU,NR  UD 02/06/18 1017 Done    Acceptance E VU  EM 02/06/18 0413 Done    Acceptance E,D VU,NR Educated on progression of POC, goals. Benefits of mobility.  02/05/18 0941 Done   Family Acceptance E VU  KS 02/08/18 1023 Done    Acceptance E,D DU,NR   02/06/18 1017 Done    Acceptance E,D VU,NR Educated on progression of POC, goals. Benefits of mobility.  02/05/18 0941 Done               Point: Body mechanics (Active)    Learning Progress Summary    Learner Readiness Method Response Comment Documented by Status   Patient Acceptance E NR  KR 02/08/18 1450 Active    Acceptance E VU  KS 02/08/18 1023 Done    Acceptance E NR  KR 02/07/18 1607 Active    Acceptance E,D DU,NR  UD 02/06/18 1017 Done    Acceptance E VU  EM 02/06/18 0413 Done    Acceptance E,D VU,NR Educated on progression of POC, goals. Benefits of mobility.  02/05/18 0941 Done   Family Acceptance E VU  KS 02/08/18 1023 Done    Acceptance E,D DU,NR   02/06/18 1017 Done    Acceptance E,D VU,NR Educated on progression of POC, goals. Benefits of mobility.  02/05/18 0941 Done               Point: Precautions (Active)    Learning Progress Summary    Learner Readiness Method Response Comment Documented by Status   Patient Acceptance E NR  KR 02/08/18 1450 Active    Acceptance E VU  KS 02/08/18 1023 Done    Acceptance E NR  KR 02/07/18 1607 Active    Acceptance E,D DU,NR  UD 02/06/18 1017 Done    Acceptance E VU  EM 02/06/18 0413 Done    Acceptance E,D VU,NR Educated on progression of POC, goals. Benefits of mobility. LR  02/05/18 0941 Done   Family Acceptance E VU  KS 02/08/18 1023 Done    Acceptance E,D MIRELA,NR  UD 02/06/18 1017 Done    Acceptance E,D YONAS,NR Educated on progression of POC, goals. Benefits of mobility. LR 02/05/18 0941 Done                      User Key     Initials Effective Dates Name Provider Type Discipline    UD 06/22/15 -  Bri Potter, PTA Physical Therapy Assistant PT    LR 06/19/15 -  Kari Diaz, PT Physical Therapist PT    KS 09/18/16 -  Laura Kelsey, RN Registered Nurse Nurse    KR 09/25/17 -  Elvira Gil, PT Physical Therapist PT    EM 06/12/17 -  Cammie Ruiz, RN Registered Nurse Nurse                    PT Recommendation and Plan  Anticipated Equipment Needs At Discharge:  (none)  Anticipated Discharge Disposition: home with assist, home with home health, other (see comments) (depending on progress, may need rehab)  Planned Therapy Interventions: balance training, bed mobility training, home exercise program, patient/family education, ROM (Range of Motion), strengthening, transfer training, wheelchair management/propulsion training  PT Frequency: daily  Plan of Care Review  Plan Of Care Reviewed With: patient  Progress: progress toward functional goals as expected  Outcome Summary/Follow up Plan: Pt STS x2 from chair with modA x2. Pt has increased difficulty maintaining upright posture during stand. Pt attempted to take step with LLE, but unable to weight shift onto RLE. Continue to progress as appropriate.           Outcome Measures       02/08/18 1303 02/07/18 1534 02/06/18 0930    How much help from another person do you currently need...    Turning from your back to your side while in flat bed without using bedrails? 2  -KR 2  -KR 1  -UD    Moving from lying on back to sitting on the side of a flat bed without bedrails? 2  -KR 2  -KR 2  -UD    Moving to and from a bed to a chair (including a wheelchair)? 2  -KR 2  -KR 2  -UD    Standing up from a chair using your arms (e.g., wheelchair,  bedside chair)? 2  -KR 2  -KR 2  -UD    Climbing 3-5 steps with a railing? 1  -KR 1  -KR 1  -UD    To walk in hospital room? 1  -KR 1  -KR 1  -UD    AM-PAC 6 Clicks Score 10  -KR 10  -KR 9  -UD    Functional Assessment    Outcome Measure Options AM-PAC 6 Clicks Basic Mobility (PT)  -KR AM-PAC 6 Clicks Basic Mobility (PT)  -KR       User Key  (r) = Recorded By, (t) = Taken By, (c) = Cosigned By    Initials Name Provider Type    UD Bri Potter, PTA Physical Therapy Assistant    KR Elvira Gil, PT Physical Therapist           Time Calculation:         PT Charges       02/08/18 1453          Time Calculation    Start Time 1303  -KR      PT Received On 02/08/18  -KR      PT Goal Re-Cert Due Date 02/15/18  -KR      Time Calculation- PT    Total Timed Code Minutes- PT 25 minute(s)  -KR        User Key  (r) = Recorded By, (t) = Taken By, (c) = Cosigned By    Initials Name Provider Type    KR Elvira Gil, PT Physical Therapist          Therapy Charges for Today     Code Description Service Date Service Provider Modifiers Qty    09991193596 HC PT THER PROC EA 15 MIN 2/7/2018 Elvira N Ring, PT GP 2    20561573855 HC PT THER SUPP EA 15 MIN 2/7/2018 Elvira N Ring, PT GP 2    82329724980 HC PT THER PROC EA 15 MIN 2/8/2018 Elvira N Ring, PT GP 2    69072977252 HC PT THER SUPP EA 15 MIN 2/8/2018 Elvira N Jeffery, PT GP 2          PT G-Codes  PT Professional Judgement Used?: Yes  Outcome Measure Options: AM-PAC 6 Clicks Basic Mobility (PT)  Score: 10  Functional Limitation: Mobility: Walking and moving around  Mobility: Walking and Moving Around Current Status (): At least 60 percent but less than 80 percent impaired, limited or restricted  Mobility: Walking and Moving Around Goal Status (): At least 40 percent but less than 60 percent impaired, limited or restricted    Olivia Gil PT  2/8/2018

## 2018-02-08 NOTE — PROGRESS NOTES
[facility] Medicine Services  PROGRESS NOTE    Patient Name: Kristin Magdaleno  : 1935  MRN: [id number]    Date of Admission: 2018  Length of Stay: 1  Primary Care Physician: Maryann Guillaume MD    Subjective   CC: follow up L rib fractures    HPI:  Patient lying in bed in NAD.  Patient alert, oriented and answering questions appropriately.  Understands that she is going to rehabilitation and is waiting for a bed.  No adverse events overnight.  No family in the room.  Patient states she saw after work with PT.      Review of Systems  Gen- No fevers, chills  CV- No chest pain, palpitations  Resp- No cough, dyspnea  GI- No N/V/D, abd pain  Musk- L rib pain, worse with inspiration/movement  Otherwise ROS is negative except as mentioned in the HPI.    Objective   Vital Signs:   Temp:  [97.9 °F (36.6 °C)-98.5 °F (36.9 °C)] 98.4 °F (36.9 °C)  Heart Rate:  [76-93] 83  Resp:  [16-18] 18  BP: (115-160)/(58-92) 134/63  Physical Exam:  General: Alert, well-developed well-nourished female in no acute distress    Head: Normocephalic atraumatic    Eyes: PERRLA, EOMI, nonicteric, conjunctiva normal    ENT: Pink, moist mucous membranes    Neck: Supple, nontender, trachea midline without lymphadenopathy, JVD, nuchal rigidity.      Cardiovascular: RRR  no M/R/G  +1 DP pulses    Respiratory: Nonlabored, symmetrical chest expansion, clear to auscultation bilaterally    Abdomen: Soft, nontender, nondistended,  positive bowel sounds in all 4 quadrants     Extremities:  negative for edema/cyanosis.  Negative calf pain    Skin: Pink/warm/dry.  No rash or lesions noted    Neuro: Alert and oriented to person place time and situation, speech is clear, follows all commands, left hemiparesis    Psych: Patient is pleasant and cooperative.  Normal affect.  Negative suicidal ideation or homicidal ideation.    Results Reviewed:  I have personally reviewed current lab, radiology, and data and agree.    Results  from last 7 days  Lab Units 02/08/18  0600 02/07/18  0554 02/06/18  0737 02/05/18  0601 02/04/18  1138   WBC 10*3/mm3  --   --  9.89 9.71 13.90*   HEMOGLOBIN g/dL  --   --  10.5* 10.0* 11.3*   HEMATOCRIT %  --   --  32.9* 31.8* 37.2   PLATELETS 10*3/mm3  --   --  335 339 377   INR  2.23 1.48 1.33 1.86 2.49       Results from last 7 days  Lab Units 02/05/18  0601 02/04/18  1138   SODIUM mmol/L 138 143   POTASSIUM mmol/L 4.0 4.2   CHLORIDE mmol/L 104 107   CO2 mmol/L 31.0 31.0   BUN mg/dL 16 24*   CREATININE mg/dL 0.60 0.80   GLUCOSE mg/dL 91 114*   CALCIUM mg/dL 8.4* 9.0   ALT (SGPT) U/L  --  15   AST (SGOT) U/L  --  18     Estimated Creatinine Clearance: 48 mL/min (by C-G formula based on Cr of 0.6).  No results found for: BNP  No results found for: PHART    Microbiology Results Abnormal     None        Imaging Results (last 24 hours)     ** No results found for the last 24 hours. **        I have reviewed the medications.    Assessment / Plan     Hospital Problem List     * (Principal)Multiple closed fractures of ribs of left side    Essential hypertension, benign    Pure hypercholesterolemia    Diabetes mellitus without complication    Overview Signed 7/20/2016  7:09 PM by Vick Diaz     Type 2 or unspecified, not stated as uncontrolled.         CVA (cerebral vascular accident)    Overview Signed 2/4/2018  3:38 PM by JAY Ellison     With Rt hemiparesis x 20+ years         Atrial fibrillation    Rheumatic heart disease    Overview Signed 9/7/2017  8:29 AM by Maryann Guillaume MD     as a child, heart murmur, no recent echos (pt. declines)         Heart murmur (Chronic)    Overview Signed 2/4/2018  3:35 PM by JAY Ellison     RHF as child.  no recent echos (pt declines)         Subcutaneous emphysema    Overview Signed 2/4/2018  3:36 PM by Viviana China John-Smiley, APRN     Acute s/p fall         Contusion of left lower extremity    Renal cyst        Brief Hospital Course to  date:  Kristin Magdaleno is a 83 y.o. female with PMH significant for HTN, hyperlipidemia, non-insulin dependent DMII, Coronary artery disease with prior history of coronary artery bypass grafting with intraoperative atrial fibrillation complicated by ischemic CVA with residual and prior CVA with left-sided hemiparesis (20+ years ago). She presented to [facility] ED on 2/4/18 after a fall at home, sustaining multiple closed L rib fractures with development of subcutaneous edema.     9th and 10th L rib fractures  LLE contusion  Fall at home  - Continue PT/OT, case management following for possible rehab options at discharge  - Pain control PRN  - Incentive spirometry - teaching and encouragement. High risk of developing subsequent pneumonia   - No LLE fracture on radiology, mild medial ankle effusion appears stable     Trace intrapleural air and subcutaneous emphysema  - No evidence of pneumothorax or pleural effusion  - Continue to monitor  - 2/6 CXR appears stable, some increased markings along L lung base representing atelectasis from poor inspiratory effort.  Patient encouraged to use incentive spirometry    Non-insulin dependent DMII  - Continue accuchecks and SSI  - Using little to no insulin  - Hgb A1c 5.9%     Chronic coccygeal decubitus ulcer  - WOC to evaluate     History of CAD s/p CABG stable no anginal symptoms  Intraoperative atrial fibrillation/CVA with residual left sided hemiparesis  - Maintained on Coumadin therapy  - Coumadin held upon admission due to fall with contusions  - Hemoglobin appears stable   - CHADS VASC = 7 (11% annual risk of stroke) - HAS BLED = 2 (4.1% annual risk of bleeding)   - Discussed with patient and , they would like to resume Coumadin.   - Pharmacy dosing. AM INR and CBC, no signs of blood loss    Hypothyroidism   - TSH elevated, however T4 normal  - Patient/family previously opted to stop Synthroid with guidance from PCP     Hypertension  --suboptimally  controlled.  Increase metoprolol succinate to 50 mg daily    Hyperlipidemia  --statin     Narcotic induced constipation.    --Continue MiraLAX.  --Add Senokot and magnesium citrate    DVT Prophylaxis:  Mechanical    CODE STATUS: Full Code    Disposition: I expect the patient to be discharged to rehabilitation facility once bed is available.  She is medically clear    Kari Walter, APRN  02/08/18  11:29 AM

## 2018-02-09 ENCOUNTER — EPISODE CHANGES (OUTPATIENT)
Dept: CASE MANAGEMENT | Facility: OTHER | Age: 83
End: 2018-02-09

## 2018-02-09 LAB
GLUCOSE BLDC GLUCOMTR-MCNC: 123 MG/DL (ref 70–130)
GLUCOSE BLDC GLUCOMTR-MCNC: 141 MG/DL (ref 70–130)
GLUCOSE BLDC GLUCOMTR-MCNC: 153 MG/DL (ref 70–130)
GLUCOSE BLDC GLUCOMTR-MCNC: 227 MG/DL (ref 70–130)
INR PPP: 2.41
PROTHROMBIN TIME: 27 SECONDS (ref 9.6–11.5)

## 2018-02-09 PROCEDURE — 97110 THERAPEUTIC EXERCISES: CPT

## 2018-02-09 PROCEDURE — 94799 UNLISTED PULMONARY SVC/PX: CPT

## 2018-02-09 PROCEDURE — 99232 SBSQ HOSP IP/OBS MODERATE 35: CPT | Performed by: NURSE PRACTITIONER

## 2018-02-09 PROCEDURE — 85610 PROTHROMBIN TIME: CPT

## 2018-02-09 PROCEDURE — 82962 GLUCOSE BLOOD TEST: CPT

## 2018-02-09 RX ADMIN — ATORVASTATIN CALCIUM 40 MG: 40 TABLET, FILM COATED ORAL at 08:10

## 2018-02-09 RX ADMIN — CASTOR OIL AND BALSAM, PERU: 788; 87 OINTMENT TOPICAL at 08:11

## 2018-02-09 RX ADMIN — TRAZODONE HYDROCHLORIDE 50 MG: 50 TABLET ORAL at 21:53

## 2018-02-09 RX ADMIN — SENNOSIDES 17.2 MG: 8.6 TABLET, FILM COATED ORAL at 08:10

## 2018-02-09 RX ADMIN — LOSARTAN POTASSIUM AND HYDROCHLOROTHIAZIDE 2 TABLET: 12.5; 5 TABLET ORAL at 08:10

## 2018-02-09 RX ADMIN — POLYETHYLENE GLYCOL 3350 17 G: 17 POWDER, FOR SOLUTION ORAL at 08:10

## 2018-02-09 RX ADMIN — WARFARIN SODIUM 4 MG: 4 TABLET ORAL at 17:38

## 2018-02-09 RX ADMIN — HYDROCODONE BITARTATE AND ACETAMINOPHEN 1 TABLET: 5; 325 TABLET ORAL at 06:19

## 2018-02-09 RX ADMIN — MULTIPLE VITAMINS W/ MINERALS TAB: TAB ORAL at 08:10

## 2018-02-09 RX ADMIN — INSULIN LISPRO 3 UNITS: 100 INJECTION, SOLUTION INTRAVENOUS; SUBCUTANEOUS at 21:53

## 2018-02-09 RX ADMIN — HYDROCODONE BITARTATE AND ACETAMINOPHEN 1 TABLET: 5; 325 TABLET ORAL at 17:38

## 2018-02-09 RX ADMIN — POLYETHYLENE GLYCOL 3350 17 G: 17 POWDER, FOR SOLUTION ORAL at 21:53

## 2018-02-09 RX ADMIN — HYDROCODONE BITARTATE AND ACETAMINOPHEN 1 TABLET: 5; 325 TABLET ORAL at 10:16

## 2018-02-09 RX ADMIN — METOPROLOL SUCCINATE 50 MG: 50 TABLET, EXTENDED RELEASE ORAL at 08:10

## 2018-02-09 RX ADMIN — CASTOR OIL AND BALSAM, PERU: 788; 87 OINTMENT TOPICAL at 21:54

## 2018-02-09 RX ADMIN — INSULIN LISPRO 2 UNITS: 100 INJECTION, SOLUTION INTRAVENOUS; SUBCUTANEOUS at 11:42

## 2018-02-09 RX ADMIN — HYDROCODONE BITARTATE AND ACETAMINOPHEN 1 TABLET: 5; 325 TABLET ORAL at 21:53

## 2018-02-09 RX ADMIN — DIGOXIN 250 MCG: 250 TABLET ORAL at 08:10

## 2018-02-09 RX ADMIN — HYDROCODONE BITARTATE AND ACETAMINOPHEN 1 TABLET: 5; 325 TABLET ORAL at 01:57

## 2018-02-09 RX ADMIN — HYDROCODONE BITARTATE AND ACETAMINOPHEN 1 TABLET: 5; 325 TABLET ORAL at 13:55

## 2018-02-09 RX ADMIN — SENNOSIDES 17.2 MG: 8.6 TABLET, FILM COATED ORAL at 21:53

## 2018-02-09 NOTE — PROGRESS NOTES
"Pharmacy Consult  -  Warfarin    Kristin Magdaleno is an 82 yo woman admitted 2/4/18 after a fall at home, sustaining multiple closed L rib fractures with development of subcutaneous edema. PMH significant for CAD s/p CABG with intraoperative atrial fibrillation/CVA with residual R hemiparesis. Pharmacy consulted to manage resumption of warfarin therapy which was held upon admission due to fall with contusions.       Height - 177.8 cm (70\")  Weight - 55.1 kg (121 lb 8 oz)     Consulting Provider: - ISAEL Barrios  Indication: - Afib  Goal INR:  2-3  Home Regimen:                        - Warfarin 4 mg po on Mon, Tues, Wed, Thurs, Fri and Sat                        - Warfarin 3 mg po on Sundays only     Bridge Therapy: No     Drug-Drug Interactions with current regimen: None at present     Warfarin Dosing During Admission:     Date  2/5 2/6 2/7 2/8 2/9           INR  1.86  1.33  1.48  2.23  2.41           Dose  4 mg  6 mg  6mg  4 mg  4 mg              Labs:    Results from last 7 days   Lab Units 02/09/18  0703 02/08/18  0600 02/07/18  0554 02/06/18  0737 02/05/18  0601 02/04/18  1138   INR  2.41 2.23 1.48 1.33 1.86 2.49   HEMOGLOBIN g/dL --  --  --  10.5* 10.0* 11.3*   HEMATOCRIT % --  --  --  32.9* 31.8* 37.2   PLATELETS 10*3/mm3 --  --  --  335 339 377     Diet Order   Procedures   • Diet Regular     Assessment/Plan:     INR remain therapeutic. Continue home regimen.    Thank you for this consult.  Brice Fofana IV, PharmD, BCPS  2/9/2018  10:54 AM           "

## 2018-02-09 NOTE — PLAN OF CARE
Problem: Patient Care Overview (Adult)  Goal: Plan of Care Review  Outcome: Ongoing (interventions implemented as appropriate)   02/09/18 1731   Coping/Psychosocial Response Interventions   Plan Of Care Reviewed With patient;daughter   Patient Care Overview   Progress progress towards functional goals is fair   Outcome Evaluation   Outcome Summary/Follow up Plan Able to stand x 2 for 3 min.total,w/ max asst, wearing R AFO, & performed bal activ sit & stand, but noted signif incr. BP, L ribcage pain (dandre. w/ deep inspirations) & signif fatigue; will cont to prog mobil & SPT to chair       Problem: Inpatient Physical Therapy  Goal: Bed Mobility Goal LTG- PT  Outcome: Ongoing (interventions implemented as appropriate)   02/05/18 0845 02/09/18 1731   Bed Mobility PT LTG   Bed Mobility PT LTG, Date Established 02/05/18 --    Bed Mobility PT LTG, Time to Achieve 5 days --    Bed Mobility PT LTG, Activity Type supine to sit/sit to supine --    Bed Mobility PT LTG, [place] Level minimum assist (75% patient effort) --    Bed Mobility PT LTG, Date Goal Reviewed 02/05/18 --    Bed Mobility PT LTG, Outcome --  goal ongoing     Goal: Transfer Training Goal 1 LTG- PT  Outcome: Ongoing (interventions implemented as appropriate)   02/05/18 0845 02/09/18 1731   Transfer Training PT LTG   Transfer Training PT LTG, Date Established 02/05/18 --    Transfer Training PT LTG, Time to Achieve 5 days --    Transfer Training PT LTG, Activity Type bed to chair /chair to bed --    Transfer Training PT LTG, [place] Level minimum assist (75% patient effort) --    Transfer Training PT LTG, Assist Device other (see comments)  (appropriate AD) --    Transfer Training PT LTG, Date Goal Reviewed 02/05/18 --    Transfer Training PT LTG, Outcome --  goal ongoing     Goal: Transfer Training Goal 2 LTG- PT  Outcome: Ongoing (interventions implemented as appropriate)   02/05/18 0845 02/09/18 1731   Transfer Training 2 PT LTG   Transfer Training  PT 2 LTG, Date Established 02/05/18 --    Transfer Training PT 2 LTG, Time to Achieve 5 days --    Transfer Training PT 2 LTG, Activity Type sit to stand/stand to sit --    Transfer Training PT 2 LTG, [place] Level minimum assist (75% patient effort) --    Transfer Training PT 2 LTG, Assist Device other (see comments)  (appropriate AD) --    Transfer Training PT 2 LTG, Date Goal Reviewed 02/05/18 --    Transfer Training PT 2 LTG, Outcome --  goal ongoing     Goal: Wheelchair Propulsion Goal LTG- PT  Outcome: Ongoing (interventions implemented as appropriate)   02/05/18 0845 02/09/18 1731   Wheelchair Propulsion PT LTG   Wheelchair Propulsion Goal PT LTG, Date Established 02/05/18 --    Wheelchair Propulsion Goal PT LTG, Time to Achieve 5 days --    Wheelchair Propulsion Goal PT LTG, [place] Level supervision required --    Wheelchair Propulsion Goal PT LTG, Distance to Achieve 50 feet --    Wheelchair Propulsion Goal PT LTG, Date Goal Reviewed 02/05/18 --    Wheelchair Propulsion Goal PT LTG, Outcome --  goal ongoing

## 2018-02-09 NOTE — PROGRESS NOTES
[facility] Medicine Services  PROGRESS NOTE    Patient Name: Kristin Magdaleno  : 1935  MRN: [id number]    Date of Admission: 2018  Length of Stay: 2  Primary Care Physician: Maryann Guillaume MD    Subjective   CC: follow up L rib fractures    HPI:  Patient lying in bed in NAD.  Patient states she is aware that she is going to [facility] tomorrow at 11 AM.  Patient states she still had no BM.  Explained to patient that she needs to have a bowel movement or she would not be able to be discharge.  No adverse events overnight.  No family in the room.     Review of Systems  Gen- No fevers, chills  CV- No chest pain, palpitations  Resp- No cough, dyspnea  GI- No N/V/D, abd pain  Musk- L rib pain, worse with inspiration/movement  Otherwise ROS is negative except as mentioned in the HPI.  Objective   Vital Signs:   Temp:  [98 °F (36.7 °C)-98.9 °F (37.2 °C)] 98.9 °F (37.2 °C)  Heart Rate:  [73-89] 73  Resp:  [18-22] 18  BP: (120-157)/(50-71) 128/50  Physical Exam:  General: Alert, well-developed well-nourished female in no acute distress    Head: Normocephalic atraumatic    Eyes: PERRLA, EOMI, nonicteric, conjunctiva normal    ENT: Pink, moist mucous membranes    Neck: Supple, nontender, trachea midline without lymphadenopathy, JVD, nuchal rigidity.      Cardiovascular: RRR  no M/R/G  +1 DP pulses    Respiratory: Nonlabored, symmetrical chest expansion, clear to auscultation bilaterally    Abdomen: Soft, nontender, nondistended,  positive bowel sounds in all 4 quadrants     Extremities:  negative for edema/cyanosis.  Negative calf pain    Skin: Pink/warm/dry.  No rash or lesions noted    Neuro: Alert and oriented to person place time and situation, speech is clear, follows all commands, left hemiparesis    Psych: Patient is pleasant and cooperative.  Normal affect.  Negative suicidal ideation or homicidal ideation.    Results Reviewed:  I have personally reviewed current lab, radiology, and  data and agree.    Results from last 7 days  Lab Units 02/09/18  0703 02/08/18  0600 02/07/18  0554 02/06/18  0737 02/05/18  0601 02/04/18  1138   WBC 10*3/mm3  --   --   --  9.89 9.71 13.90*   HEMOGLOBIN g/dL  --   --   --  10.5* 10.0* 11.3*   HEMATOCRIT %  --   --   --  32.9* 31.8* 37.2   PLATELETS 10*3/mm3  --   --   --  335 339 377   INR  2.41 2.23 1.48 1.33 1.86 2.49       Results from last 7 days  Lab Units 02/05/18  0601 02/04/18  1138   SODIUM mmol/L 138 143   POTASSIUM mmol/L 4.0 4.2   CHLORIDE mmol/L 104 107   CO2 mmol/L 31.0 31.0   BUN mg/dL 16 24*   CREATININE mg/dL 0.60 0.80   GLUCOSE mg/dL 91 114*   CALCIUM mg/dL 8.4* 9.0   ALT (SGPT) U/L  --  15   AST (SGOT) U/L  --  18     Estimated Creatinine Clearance: 48 mL/min (by C-G formula based on Cr of 0.6).  No results found for: BNP  No results found for: PHART    Microbiology Results Abnormal     None        Imaging Results (last 24 hours)     ** No results found for the last 24 hours. **        I have reviewed the medications.    Assessment / Plan     Hospital Problem List     * (Principal)Multiple closed fractures of ribs of left side    Essential hypertension, benign    Pure hypercholesterolemia    Diabetes mellitus without complication    Overview Signed 7/20/2016  7:09 PM by Vick Diaz     Type 2 or unspecified, not stated as uncontrolled.         CVA (cerebral vascular accident)    Overview Signed 2/4/2018  3:38 PM by JAY Ellison     With Rt hemiparesis x 20+ years         Atrial fibrillation    Rheumatic heart disease    Overview Signed 9/7/2017  8:29 AM by Maryann Guillaume MD     as a child, heart murmur, no recent echos (pt. declines)         Heart murmur (Chronic)    Overview Signed 2/4/2018  3:35 PM by JAY Ellison     RHF as child.  no recent echos (pt declines)         Subcutaneous emphysema    Overview Signed 2/4/2018  3:36 PM by JAY Ellison     Acute s/p fall         Contusion of left  lower extremity    Renal cyst        Brief Hospital Course to date:  Kristin Magdaleno is a 83 y.o. female with PMH significant for HTN, hyperlipidemia, non-insulin dependent DMII, Coronary artery disease with prior history of coronary artery bypass grafting with intraoperative atrial fibrillation complicated by ischemic CVA with residual and prior CVA with left-sided hemiparesis (20+ years ago). She presented to [facility] ED on 2/4/18 after a fall at home, sustaining multiple closed L rib fractures with development of subcutaneous edema.     9th and 10th L rib fractures  LLE contusion  Fall at home  - Continue PT/OT, case management following for possible rehab options at discharge  - Pain control PRN  - Incentive spirometry - teaching and encouragement. High risk of developing subsequent pneumonia   - No LLE fracture on radiology, mild medial ankle effusion appears stable     Trace intrapleural air and subcutaneous emphysema  - No evidence of pneumothorax or pleural effusion  - Continue to monitor  - 2/6 CXR appears stable, some increased markings along L lung base representing atelectasis from poor inspiratory effort.  Patient encouraged to use incentive spirometry    Non-insulin dependent DMII  - Continue accuchecks and SSI  - Using little to no insulin  - Hgb A1c 5.9%     Chronic coccygeal decubitus ulcer  - WOC to evaluate     History of CAD s/p CABG stable no anginal symptoms  Intraoperative atrial fibrillation/CVA with residual left sided hemiparesis  - Maintained on Coumadin therapy; INR therapuetic 2.41  - Coumadin held upon admission due to fall with contusions  - Hemoglobin appears stable   - CHADS VASC = 7 (11% annual risk of stroke) - HAS BLED = 2 (4.1% annual risk of bleeding)   - Discussed with patient and , they would like to resume Coumadin.   - Pharmacy dosing. AM INR and CBC, no signs of blood loss    Hypothyroidism   - TSH elevated, however T4 normal  - Patient/family  previously opted to stop Synthroid with guidance from PCP     Hypertension  --suboptimally controlled.  Increase metoprolol succinate to 50 mg daily  --Stable since adding metoprolol    Hyperlipidemia  --statin     Narcotic induced constipation.    --Continue MiraLAX.  --Add Senokot and magnesium citrate    DVT Prophylaxis:  Mechanical    CODE STATUS: Full Code    Disposition: I expect the patient to be discharged to [facility] on 2/10 at 11A. She is medically clear    Kari Walter, APRN  02/09/18  7:52 AM

## 2018-02-09 NOTE — PLAN OF CARE
Problem: Patient Care Overview (Adult)  Goal: Plan of Care Review  Outcome: Ongoing (interventions implemented as appropriate)   02/09/18 0402   Coping/Psychosocial Response Interventions   Plan Of Care Reviewed With patient   Patient Care Overview   Progress no change   Outcome Evaluation   Outcome Summary/Follow up Plan Pt resting well overnight, no c/o of pain with scheduled norco,plan to send pt to [facility] saturday via ambulance.     Goal: Adult Individualization and Mutuality  Outcome: Ongoing (interventions implemented as appropriate)    Goal: Discharge Needs Assessment  Outcome: Ongoing (interventions implemented as appropriate)      Problem: Fall Risk (Adult)  Goal: Identify Related Risk Factors and Signs and Symptoms  Outcome: Ongoing (interventions implemented as appropriate)    Goal: Absence of Falls  Outcome: Ongoing (interventions implemented as appropriate)      Problem: Pressure Ulcer Risk (Grey Scale) (Adult,Obstetrics,Pediatric)  Goal: Identify Related Risk Factors and Signs and Symptoms  Outcome: Ongoing (interventions implemented as appropriate)    Goal: Skin Integrity  Outcome: Ongoing (interventions implemented as appropriate)

## 2018-02-09 NOTE — PROGRESS NOTES
Continued Stay Note  [facility]     Patient Name: Kristin Magdaleno  MRN: [id number]  Today's Date: 2/9/2018    Admit Date: 2/4/2018          Discharge Plan       02/09/18 [facility]    Case Management/Social Work Plan    Plan [facility]    Additional Comments Spoke with Dacia at [facility] she was in agreement with pt transfering on Saturday. [facility] will transport at 1100. CMN is in chart. RN will call report to [phone].              Discharge Codes     None        Expected Discharge Date and Time     Expected Discharge Date Expected Discharge Time    Feb 10, 2018             Milly Wallace RN

## 2018-02-09 NOTE — THERAPY TREATMENT NOTE
"Acute Care - Physical Therapy Treatment Note  [facility]     Patient Name: Kristin Magdaleno  : 1935  MRN: [id number]  Today's Date: 2018  Onset of Illness/Injury or Date of Surgery Date: 18  Date of Referral to PT: 18  Referring Physician: JAY Wilcox    Admit Date: 2018    Visit Dx:    ICD-10-CM ICD-9-CM   1. Closed fracture of multiple ribs of left side, initial encounter S22.42XA 807.09   2. Contusion of left lower extremity, initial encounter S80.12XA 924.5   3. Fall, initial encounter W19.XXXA E888.9   4. Subcutaneous emphysema, initial encounter T79.7XXA 958.7   5. Impaired functional mobility, balance, gait, and endurance Z74.09 V49.89     Patient Active Problem List   Diagnosis   • Essential hypertension, benign   • Pure hypercholesterolemia   • Diabetes mellitus without complication   • Coronary atherosclerosis   • CVA (cerebral vascular accident)   • Atrial fibrillation   • Rheumatic heart disease   • Multiple closed fractures of ribs of left side   • Heart murmur   • Subcutaneous emphysema   • Contusion of left lower extremity   • Renal cyst               Adult Rehabilitation Note       18 1633 18 1303 18 1534    Rehab Assessment/Intervention    Discipline physical therapist  -DM physical therapist  -KR physical therapist  -KR    Document Type therapy note (daily note)  -DM therapy note (daily note)  -KR therapy note (daily note)  -KR    Subjective Information agree to therapy;complains of;weakness;pain   painLribs w/ mvmt;hadPainPill;nsgReq.noUIC(\"wasUICtill4:30am  -DM agree to therapy;no complaints  -KR agree to therapy;no complaints  -KR    Patient Effort, Rehab Treatment good  -DM good  -KR good  -KR    Symptoms Noted During/After Treatment fatigue;increased pain;significant change in vital signs  -DM fatigue  -KR increased pain;fatigue  -KR    Symptoms Noted Comment pt indignant,stating\"no I did not\" when dtr told she req.staying UIC  till 4:30 " am today;nsg will consult w/ pt/dtr when free   -DM      Precautions/Limitations fall precautions;oxygen therapy device and L/min;other (see comments);brace on when up   R AFOwhen up;wicking cath;multLrib fxs(putPad under gt belt)  -DM fall precautions;brace on when up   R LE brace; rib fxs on L  -KR fall precautions;brace on when up   R LE brace; rib fxs on L  -KR    Specific Treatment Considerations prev. CVA/RESID. R HP  -DM      Recorded by [DM] Sara Cortés, PT [KR] Elvira Gil, PT [KR] Elvira Gil, PT    Vital Signs    Pre Systolic BP Rehab 139  -  -  -KR    Pre Treatment Diastolic BP 57  -DM 65  -KR 61  -KR    Post Systolic BP Rehab 159  -DM      Post Treatment Diastolic BP 80  -DM      Pretreatment Heart Rate (beats/min) 70  -DM 86  -KR 76  -KR    Intratreatment Heart Rate (beats/min) 79  -DM      Posttreatment Heart Rate (beats/min) 77  -DM 81  -KR 75  -KR    Pre SpO2 (%) 95  -DM  89  -KR    O2 Delivery Pre Treatment supplemental O2   2 L  -DM room air  -KR room air  -KR    Post SpO2 (%) 95  -DM  91  -KR    O2 Delivery Post Treatment supplemental O2  -DM room air  -KR room air  -KR    Pre Patient Position Supine  -DM Sitting  -KR Sitting  -KR    Intra Patient Position Standing  -DM Standing  -KR Standing  -KR    Post Patient Position Supine   RESPIREX 400 CC  -DM Sitting  -KR Sitting  -KR    Recorded by [DM] Sara Cortés, PT [KR] Elvira Gil, PT [KR] Elvira Gil, PT    Pain Assessment    Pain Assessment 0-10  -DM 0-10  -KR 0-10  -KR    Pain Score 5  -DM 0  -KR 0  -KR    Post Pain Score 7  -DM 0  -KR 4  -KR    Pain Type Acute pain  -DM  Acute pain  -KR    Pain Location Rib cage  -DM  Rib cage  -KR    Pain Orientation Left  -DM  Left  -KR    Pain Descriptors Aching  -DM      Pain Frequency Intermittent  -DM      Patient's Stated Pain Goal No pain  -DM      Pain Intervention(s) Repositioned;Rest  -DM  Repositioned  -KR    Response to Interventions tolerated  -DM  tolerated  -KR     Recorded by [DM] Sara Cortés, PT [KR] Elvira Gil PT [KR] Elvira Gil PT    Vision Assessment/Intervention    Visual Impairment WFL  -DM      Recorded by [DM] Sara Cortés PT      Cognitive Assessment/Intervention    Current Cognitive/Communication Assessment impaired  -DM impaired  -KR impaired  -KR    Orientation Status oriented to;person  -DM oriented to;person;time  -KR oriented to;person;time  -KR    Follows Commands/Answers Questions 75% of the time;100% of the time;able to follow single-step instructions;needs cueing;needs increased time;needs repetition  -DM able to follow single-step instructions;100% of the time;needs cueing;needs increased time  -KR able to follow single-step instructions;100% of the time;needs cueing;needs increased time  -KR    Personal Safety mild impairment;decreased awareness, need for assist;decreased awareness, need for safety;decreased insight to deficits;impulsive  -DM WNL/WFL  -KR WNL/WFL  -KR    Personal Safety Interventions fall prevention program maintained;gait belt;nonskid shoes/slippers when out of bed   nsg req. bed alarm on  -DM fall prevention program maintained;gait belt;nonskid shoes/slippers when out of bed  -KR fall prevention program maintained;gait belt;nonskid shoes/slippers when out of bed  -KR    Recorded by [DM] Sara Cortés, PT [KR] Elvira Gil PT [KR] Elvira Gil PT    Bed Mobility, Assessment/Treatment    Bed Mobility, Assistive Device bed rails;head of bed elevated;draw sheet  -DM      Bed Mobility, Roll Left, [place] maximum assist (25% patient effort);verbal cues required  -DM      Bed Mobility, Roll Right, [place] maximum assist (25% patient effort);verbal cues required  -DM      Bed Mobility, Scoot/Bridge, [place] maximum assist (25% patient effort);2 person assist required;verbal cues required  -DM      Bed Mob, Supine to Sit, [place] maximum assist (25% patient effort);verbal cues required   wicking cath  removed & placed R AFO & B shoes on pt p/t EOB;  -DM not tested  -KR not tested  -KR    Bed Mob, Sit to Supine, [place] dependent (less than 25% patient effort);2 person assist required;verbal cues required  -DM not tested  -KR not tested  -KR    Bed Mobility, Safety Issues decreased use of arms for pushing/pulling;decreased use of legs for bridging/pushing;impaired trunk control for bed mobility  -DM      Bed Mobility, Impairments strength decreased;impaired balance;postural control impaired;pain  -DM      Bed Mobility, Comment cues for HP, Seq.  -DM UIC  -KR UIC  -KR    Recorded by [DM] Sara Cortés, PT [KR] Elvira Gil, PT [KR] Elvira Gil, PT    Transfer Assessment/Treatment    Transfers, Sit-Stand [place] maximum assist (25% patient effort);verbal cues required   STSx2;L KNEE blocked;padding placed under gt belt at L ribs  -DM moderate assist (50% patient effort);2 person assist required;verbal cues required  -KR moderate assist (50% patient effort);2 person assist required;verbal cues required  -KR    Transfers, Stand-Sit [place] maximum assist (25% patient effort);verbal cues required  -DM moderate assist (50% patient effort);2 person assist required;verbal cues required  -KR moderate assist (50% patient effort);2 person assist required;verbal cues required  -KR    Transfers, Sit-Stand-Sit, Assist Device other (see comments);bed rails;elevated surface   gt belt;RUE SUPP;pt held bedrail w/L HAND  -DM --   B UE support  -KR     Transfer, Safety Issues sequencing ability decreased;weight-shifting ability decreased;loses balance backward;knees buckling   RLE int. rot & add.duringSTS transf;PT stabiliz.in neutral  -DM step length decreased;weight-shifting ability decreased;sequencing ability decreased  -KR step length decreased;weight-shifting ability decreased;sequencing ability decreased  -KR    Transfer, Impairments strength decreased;impaired balance;motor control impaired;postural  control impaired;pain  -DM ROM decreased;strength decreased;impaired balance;postural control impaired  -KR strength decreased;ROM decreased;impaired balance;pain;postural control impaired  -KR    Transfer, Comment cues for trunk/neck ext,Knee stabil, incr.WB LUE, &midline WS   NSG REQ.no UIC,as pt had insisted on remaining up till4:30am  -DM Pt STS x2 from chair. Pt required VC's to push up from chair; increased difficulty maintaining upright posture. Pt attempted to take step with LLE, but unable to complete.   -KR STS x4 from chair. VC's to push up from chair and for upright posture. Pt c/o increased pain in her ribs during transfer. Pt able to take one step forward and backward with LLE, but fatigued quickly; unable to take additional steps.   -KR    Recorded by [DM] Sara Cortés, PT [KR] Elvira Gil, PT [KR] Elvira Gil, PT    Gait Assessment/Treatment    Gait, [place] Level not appropriate to assess  -DM not appropriate to assess  -KR not appropriate to assess  -KR    Gait, Comment IS NON-AMB.  -DM  Pt non-ambulatory at baseline.  -KR    Recorded by [DM] Sara Cortés, PT [KR] Elvira Gil, PT [KR] Elvira Gil, PT    Motor Skills/Interventions    Additional Documentation Balance Skills Training (Group)  -DM Balance Skills Training (Group)  -KR Balance Skills Training (Group)  -KR    Recorded by [DM] Sara Cortés, PT [KR] Elvira Gil, PT [KR] Elvira Gil, PT    Balance Skills Training    Sitting-Level of Assistance Moderate assistance   counterpress.pillow toLlat trunk to facil.stabiliz.inMidline  -DM Minimum assistance  -KR Minimum assistance  -KR    Sitting-Balance Support Right upper extremity supported;Left upper extremity supported;Feet supported  -DM Left upper extremity supported;Feet supported  -KR Left upper extremity supported;Feet supported  -KR    Sitting-Balance Activities Head control activities (Comment);Trunk control activities;Left UE Weight Bearing;Forward lean    scooting;WS all direct,trunk & neck ext, LLE AROM,RLE PROM  -DM      Sitting # of Minutes 10  -DM      Standing-Level of Assistance Maximum assistance  -DM Moderate assistance;x2  -KR Moderate assistance;x2  -KR    Static Standing Balance Support Right upper extremity supported;Left upper extremity supported   gt belt;L knee blocked;RLE stabil.inNeutral(R AFO in place)  -DM Right upper extremity supported;Left upper extremity supported  -KR Right upper extremity supported;Left upper extremity supported  -KR    Standing-Balance Activities Weight Shift A-P;Weight Shift R-L  -DM      Standing Balance # of Minutes 3   1 + 2  -DM      Recorded by [DM] Sara Cortés, PT [KR] Elvira Gil PT [KR] Elvira Gil PT    Therapy Exercises    Right Lower Extremity PROM:;10 reps;supine;sitting;ankle pumps/circles;calf stretch;hamstring stretch;hip abduction/adduction;hip ER;hip flexion;hip IR;knee flexion  -DM PROM:;10 reps;sitting;ankle pumps/circles;heel slides;SLR  -KR     Left Lower Extremity AROM:;AAROM:;10 reps;sitting;supine;ankle pumps/circles;heel slides;hip abduction/adduction;hip ER;hip flexion;hip IR;LAQ;quad sets;SAQ   1/2 Bridging x 2 in sup.;min asst for h.slides,hip abd.  -DM AROM:;10 reps;sitting;ankle pumps/circles;heel slides;SLR  -KR     Left Upper Extremity AROM:;10 reps;sitting;elbow flexion/extension;shoulder abduction/adduction;shoulder extension/flexion;AAROM:  -DM      Recorded by [DM] Sara Cortés, PT [KR] Elvira Gil, PT     Positioning and Restraints    Pre-Treatment Position in bed  -DM sitting in chair/recliner  -KR sitting in chair/recliner  -KR    Post Treatment Position bed   rolled/pads posn'd;scooted to HOB [email or web address];wick.cathReplaced  -DM chair  -KR chair  -KR    In Bed notified nsg;fowlers;call light within reach;encouraged to call for assist;exit alarm on;with other staff;RUE elevated;with family/caregiver   R AFO & shoes doffed;pillow propped to Bfeet (ankles neutral  -DM       In Chair  notified nsg;reclined;call light within reach;encouraged to call for assist;exit alarm on;RUE elevated;LUE elevated;legs elevated  -KR reclined;call light within reach;encouraged to call for assist;exit alarm on;waffle cushion;on mechanical lift sling;legs elevated  -KR    Recorded by [DM] Sara Cortés, PT [KR] Elvira Gil, PT [KR] Elvira Gil, PT      User Key  (r) = Recorded By, (t) = Taken By, (c) = Cosigned By    Initials Name Effective Dates    DM Sara Cortés, PT 06/19/15 -     KR Elvira Gil, PT 09/25/17 -                 IP PT Goals       02/09/18 1731 02/08/18 1450 02/07/18 1607    Bed Mobility PT LTG    Bed Mobility PT LTG, Outcome goal ongoing  -DM goal ongoing  -KR goal ongoing  -KR    Transfer Training PT LTG    Transfer Training PT LTG, Outcome goal ongoing  -DM goal ongoing  -KR goal ongoing  -KR    Transfer Training 2 PT LTG    Transfer Training PT 2 LTG, Outcome goal ongoing  -DM goal ongoing  -KR goal ongoing  -KR    Wheelchair Propulsion PT LTG    Wheelchair Propulsion Goal PT LTG, Outcome goal ongoing  -DM goal ongoing  -KR goal ongoing  -KR      02/06/18 1017 02/05/18 0845       Bed Mobility PT LTG    Bed Mobility PT LTG, Date Established  02/05/18  -LR     Bed Mobility PT LTG, Time to Achieve  5 days  -LR     Bed Mobility PT LTG, Activity Type  supine to sit/sit to supine  -LR     Bed Mobility PT LTG, [place] Level  minimum assist (75% patient effort)  -LR     Bed Mobility PT LTG, Date Goal Reviewed  02/05/18  -LR     Bed Mobility PT LTG, Outcome goal ongoing  -UD goal ongoing  -LR     Transfer Training PT LTG    Transfer Training PT LTG, Date Established  02/05/18  -LR     Transfer Training PT LTG, Time to Achieve  5 days  -LR     Transfer Training PT LTG, Activity Type  bed to chair /chair to bed  -LR     Transfer Training PT LTG, [place] Level  minimum assist (75% patient effort)  -LR     Transfer Training PT LTG, Assist Device  other (see comments)    appropriate AD  -LR     Transfer Training PT  LTG, Date Goal Reviewed  02/05/18  -LR     Transfer Training PT LTG, Outcome goal ongoing  -UD goal ongoing  -LR     Transfer Training 2 PT LTG    Transfer Training PT 2 LTG, Date Established  02/05/18  -LR     Transfer Training PT 2 LTG, Time to Achieve  5 days  -LR     Transfer Training PT 2 LTG, Activity Type  sit to stand/stand to sit  -LR     Transfer Training PT 2 LTG, [place] Level  minimum assist (75% patient effort)  -LR     Transfer Training PT 2 LTG, Assist Device  other (see comments)   appropriate AD  -LR     Transfer Training PT 2 LTG, Date Goal Reviewed  02/05/18  -LR     Transfer Training PT 2 LTG, Outcome goal ongoing  -UD goal ongoing  -LR     Wheelchair Propulsion PT LTG    Wheelchair Propulsion Goal PT LTG, Date Established  02/05/18  -LR     Wheelchair Propulsion Goal PT LTG, Time to Achieve  5 days  -LR     Wheelchair Propulsion Goal PT LTG, [place] Level  supervision required  -LR     Wheelchair Propulsion Goal PT LTG, Distance to Achieve  50 feet  -LR     Wheelchair Propulsion Goal PT LTG, Date Goal Reviewed  02/05/18  -LR     Wheelchair Propulsion Goal PT LTG, Outcome goal ongoing  -UD goal ongoing  -LR       User Key  (r) = Recorded By, (t) = Taken By, (c) = Cosigned By    Initials Name Provider Type    PANTERA Potter, PTA Physical Therapy Assistant    VICTORIANO Cortés, PT Physical Therapist    LR Kari Diaz, PT Physical Therapist    KR Elvira Gil, PT Physical Therapist          Physical Therapy Education     Title: PT OT SLP Therapies (Active)     Topic: Physical Therapy (Active)     Point: Mobility training (Active)    Learning Progress Summary    Learner Readiness Method Response Comment Documented by Status   Patient Eager E,D NR  DM 02/09/18 1731 Active    Acceptance E VU  KS 02/09/18 1051 Done    Acceptance E NR  KR 02/08/18 1450 Active    Acceptance E VU  KS 02/08/18 1023 Done    Acceptance E NR  KR 02/07/18  1607 Active    Acceptance E,D DU,NR  UD 02/06/18 1017 Done    Acceptance E VU  EM 02/06/18 0413 Done    Acceptance E,D VU,NR Educated on progression of POC, goals. Benefits of mobility. LR 02/05/18 0941 Done   Family Eager E,D NR   02/09/18 1731 Active    Acceptance E VU  KS 02/09/18 1051 Done    Acceptance E VU  KS 02/08/18 1023 Done    Acceptance E,D DU,NR  UD 02/06/18 1017 Done    Acceptance E,D VU,NR Educated on progression of POC, goals. Benefits of mobility. LR 02/05/18 0941 Done               Point: Home exercise program (Active)    Learning Progress Summary    Learner Readiness Method Response Comment Documented by Status   Patient Eager E,D NR  DM 02/09/18 1731 Active    Acceptance E VU  KS 02/09/18 1051 Done    Acceptance E NR  KR 02/08/18 1450 Active    Acceptance E VU  KS 02/08/18 1023 Done    Acceptance E,D DU,NR  UD 02/06/18 1017 Done    Acceptance E VU  EM 02/06/18 0413 Done    Acceptance E,D VU,NR Educated on progression of POC, goals. Benefits of mobility. LR 02/05/18 0941 Done   Family Eager E,D NR  DM 02/09/18 1731 Active    Acceptance E VU  KS 02/09/18 1051 Done    Acceptance E VU  KS 02/08/18 1023 Done    Acceptance E,D DU,NR  UD 02/06/18 1017 Done    Acceptance E,D VU,NR Educated on progression of POC, goals. Benefits of mobility. LR 02/05/18 0941 Done               Point: Body mechanics (Active)    Learning Progress Summary    Learner Readiness Method Response Comment Documented by Status   Patient Eager E,D NR  DM 02/09/18 1731 Active    Acceptance E VU  KS 02/09/18 1051 Done    Acceptance E NR  KR 02/08/18 1450 Active    Acceptance E VU  KS 02/08/18 1023 Done    Acceptance E NR  KR 02/07/18 1607 Active    Acceptance E,D DU,NR  UD 02/06/18 1017 Done    Acceptance E VU  EM 02/06/18 0413 Done    Acceptance E,D VU,NR Educated on progression of POC, goals. Benefits of mobility. LR 02/05/18 0941 Done   Family Eager E,D NR  DM 02/09/18 1731 Active    Acceptance E YONAS PALMA 02/09/18 1051 Done     Acceptance E VU  KS 02/08/18 1023 Done    Acceptance E,D DU,NR  UD 02/06/18 1017 Done    Acceptance E,D VU,NR Educated on progression of POC, goals. Benefits of mobility.  02/05/18 0941 Done               Point: Precautions (Active)    Learning Progress Summary    Learner Readiness Method Response Comment Documented by Status   Patient Eager E,D NR   02/09/18 1731 Active    Acceptance E VU  KS 02/09/18 1051 Done    Acceptance E NR  KR 02/08/18 1450 Active    Acceptance E VU  KS 02/08/18 1023 Done    Acceptance E NR  KR 02/07/18 1607 Active    Acceptance E,D DU,NR  UD 02/06/18 1017 Done    Acceptance E VU  EM 02/06/18 0413 Done    Acceptance E,D VU,NR Educated on progression of POC, goals. Benefits of mobility.  02/05/18 0941 Done   Family Eager E,D NR   02/09/18 1731 Active    Acceptance E VU  KS 02/09/18 1051 Done    Acceptance E VU  KS 02/08/18 1023 Done    Acceptance E,D DU,NR  UD 02/06/18 1017 Done    Acceptance E,D VU,NR Educated on progression of POC, goals. Benefits of mobility.  02/05/18 0941 Done                      User Key     Initials Effective Dates Name Provider Type Discipline     06/22/15 -  Bri Potter, PTA Physical Therapy Assistant PT     06/19/15 -  Sara Cortés, PT Physical Therapist PT    LR 06/19/15 -  Kari Diaz, PT Physical Therapist PT    KS 09/18/16 -  Laura Kelsey, RN Registered Nurse Nurse     09/25/17 -  Elvira Gil, PT Physical Therapist PT    EM 06/12/17 -  Cammie Ruiz, RN Registered Nurse Nurse                    PT Recommendation and Plan  Anticipated Equipment Needs At Discharge:  (none)  Anticipated Discharge Disposition: home with assist, home with home health, other (see comments) (depending on progress, may need rehab)  Planned Therapy Interventions: balance training, bed mobility training, home exercise program, patient/family education, ROM (Range of Motion), strengthening, transfer training, wheelchair management/propulsion training  PT  Frequency: daily  Plan of Care Review  Plan Of Care Reviewed With: patient, daughter  Progress: progress towards functional goals is fair  Outcome Summary/Follow up Plan: Able to stand x 2 for 3 min.total,w/ max asst, wearing R AFO, & performed bal activ sit & stand, but noted signif incr. BP, L ribcage pain (dandre. w/ deep inspirations) & signif fatigue; will cont to prog mobil & SPT to chair          Outcome Measures       02/09/18 1633 02/08/18 1303 02/07/18 1534    How much help from another person do you currently need...    Turning from your back to your side while in flat bed without using bedrails? 2  -DM 2  -KR 2  -KR    Moving from lying on back to sitting on the side of a flat bed without bedrails? 2  -DM 2  -KR 2  -KR    Moving to and from a bed to a chair (including a wheelchair)? 2  -DM 2  -KR 2  -KR    Standing up from a chair using your arms (e.g., wheelchair, bedside chair)? 2  -DM 2  -KR 2  -KR    Climbing 3-5 steps with a railing? 1  -DM 1  -KR 1  -KR    To walk in hospital room? 1  -DM 1  -KR 1  -KR    AM-PAC 6 Clicks Score 10  -DM 10  -KR 10  -KR    Functional Assessment    Outcome Measure Options AM-PAC 6 Clicks Basic Mobility (PT)  -DM AM-PAC 6 Clicks Basic Mobility (PT)  -KR AM-PAC 6 Clicks Basic Mobility (PT)  -KR      User Key  (r) = Recorded By, (t) = Taken By, (c) = Cosigned By    Initials Name Provider Type    VICTORIANO Cortés, PT Physical Therapist    KR Elvira Gil, PT Physical Therapist           Time Calculation:         PT Charges       02/09/18 1734          Time Calculation    Start Time 1633  -DM      PT Received On 02/09/18  -DM      PT Goal Re-Cert Due Date 02/15/18  -DM      Time Calculation- PT    Total Timed Code Minutes- PT 36 minute(s)  -DM        User Key  (r) = Recorded By, (t) = Taken By, (c) = Cosigned By    Initials Name Provider Type    VICTORIANO Cortés, PT Physical Therapist          Therapy Charges for Today     Code Description Service Date Service Provider  Modifiers Qty    [id number] HC PT THER PROC EA 15 MIN 2/9/2018 Sara Cortés, PT GP 2          PT G-Codes  PT Professional Judgement Used?: Yes  Outcome Measure Options: AM-PAC 6 Clicks Basic Mobility (PT)  Score: 10  Functional Limitation: Mobility: Walking and moving around  Mobility: Walking and Moving Around Current Status (): At least 60 percent but less than 80 percent impaired, limited or restricted  Mobility: Walking and Moving Around Goal Status (): At least 40 percent but less than 60 percent impaired, limited or restricted    Sara Cortés, PT  2/9/2018

## 2018-02-10 VITALS
SYSTOLIC BLOOD PRESSURE: 142 MMHG | DIASTOLIC BLOOD PRESSURE: 58 MMHG | HEIGHT: 70 IN | TEMPERATURE: 98.9 F | BODY MASS INDEX: 17.39 KG/M2 | HEART RATE: 69 BPM | RESPIRATION RATE: 18 BRPM | OXYGEN SATURATION: 94 % | WEIGHT: 121.5 LBS

## 2018-02-10 LAB
GLUCOSE BLDC GLUCOMTR-MCNC: 117 MG/DL (ref 70–130)
INR PPP: 2.11 (ref 0.91–1.09)
PROTHROMBIN TIME: 22.2 SECONDS (ref 9.6–11.5)

## 2018-02-10 PROCEDURE — 99239 HOSP IP/OBS DSCHRG MGMT >30: CPT | Performed by: NURSE PRACTITIONER

## 2018-02-10 PROCEDURE — 85610 PROTHROMBIN TIME: CPT

## 2018-02-10 PROCEDURE — 82962 GLUCOSE BLOOD TEST: CPT

## 2018-02-10 RX ORDER — HYDROCODONE BITARTRATE AND ACETAMINOPHEN 5; 325 MG/1; MG/1
1 TABLET ORAL EVERY 4 HOURS
Qty: 18 TABLET | Refills: 0 | Status: SHIPPED | OUTPATIENT
Start: 2018-02-10 | End: 2018-03-05 | Stop reason: SDUPTHER

## 2018-02-10 RX ORDER — WARFARIN SODIUM 4 MG/1
TABLET ORAL
Qty: 6 TABLET | Refills: 0
Start: 2018-02-10 | End: 2019-01-01 | Stop reason: SDUPTHER

## 2018-02-10 RX ORDER — WARFARIN SODIUM 1 MG/1
1 TABLET ORAL
Status: DISCONTINUED | OUTPATIENT
Start: 2018-02-10 | End: 2018-02-10 | Stop reason: HOSPADM

## 2018-02-10 RX ORDER — METOPROLOL SUCCINATE 50 MG/1
50 TABLET, EXTENDED RELEASE ORAL DAILY
Qty: 30 TABLET | Refills: 0 | Status: SHIPPED | OUTPATIENT
Start: 2018-02-10 | End: 2018-04-02 | Stop reason: SDUPTHER

## 2018-02-10 RX ORDER — WARFARIN SODIUM 3 MG/1
TABLET ORAL
Qty: 6 TABLET | Refills: 0
Start: 2018-02-11 | End: 2018-03-05 | Stop reason: ALTCHOICE

## 2018-02-10 RX ORDER — SENNOSIDES 8.6 MG
2 TABLET ORAL 2 TIMES DAILY
Qty: 60 TABLET | Refills: 0 | Status: SHIPPED | OUTPATIENT
Start: 2018-02-10 | End: 2019-01-01 | Stop reason: SDUPTHER

## 2018-02-10 RX ADMIN — DIGOXIN 250 MCG: 250 TABLET ORAL at 09:21

## 2018-02-10 RX ADMIN — MULTIPLE VITAMINS W/ MINERALS TAB 1 TABLET: TAB ORAL at 09:21

## 2018-02-10 RX ADMIN — POLYETHYLENE GLYCOL 3350 17 G: 17 POWDER, FOR SOLUTION ORAL at 09:23

## 2018-02-10 RX ADMIN — CASTOR OIL AND BALSAM, PERU: 788; 87 OINTMENT TOPICAL at 09:22

## 2018-02-10 RX ADMIN — ATORVASTATIN CALCIUM 40 MG: 40 TABLET, FILM COATED ORAL at 09:21

## 2018-02-10 RX ADMIN — HYDROCODONE BITARTATE AND ACETAMINOPHEN 1 TABLET: 5; 325 TABLET ORAL at 09:21

## 2018-02-10 RX ADMIN — HYDROCODONE BITARTATE AND ACETAMINOPHEN 1 TABLET: 5; 325 TABLET ORAL at 06:09

## 2018-02-10 RX ADMIN — SENNOSIDES 17.2 MG: 8.6 TABLET, FILM COATED ORAL at 09:21

## 2018-02-10 RX ADMIN — LOSARTAN POTASSIUM AND HYDROCHLOROTHIAZIDE 2 TABLET: 12.5; 5 TABLET ORAL at 09:20

## 2018-02-10 RX ADMIN — HYDROCODONE BITARTATE AND ACETAMINOPHEN 1 TABLET: 5; 325 TABLET ORAL at 02:10

## 2018-02-10 RX ADMIN — METOPROLOL SUCCINATE 50 MG: 50 TABLET, EXTENDED RELEASE ORAL at 09:22

## 2018-02-10 NOTE — PLAN OF CARE
Problem: Pressure Ulcer Risk (Grey Scale) (Adult,Obstetrics,Pediatric)  Goal: Skin Integrity  Outcome: Ongoing (interventions implemented as appropriate)

## 2018-02-10 NOTE — DISCHARGE SUMMARY
[facility] Medicine Services  TRANSFER SUMMARY    Patient Name: Kristin Magdaleno  : 1935  MRN: [id number]    Date of Admission: 2018  Date of Discharge:    Length of Stay: 3  Primary Care Physician: Maryann Guillaume MD    Consults     No orders found from 2018 to 2018.          Hospital Course     Presenting Problem:   Closed fracture of multiple ribs of left side, initial encounter [S22.42XA]  Closed fracture of multiple ribs of left side, initial encounter [S22.42XA]      Active Hospital Problems (** Indicates Principal Problem)    Diagnosis Date Noted   • **Multiple closed fractures of ribs of left side [S22.42XA] 2018   • Heart murmur [R01.1] 2018   • Subcutaneous emphysema [T79.7XXA] 2018   • Contusion of left lower extremity [S80.12XA] 2018   • Renal cyst [N28.1] 2018   • Rheumatic heart disease [I09.9]    • Atrial fibrillation [I48.91] 2016   • Pure hypercholesterolemia [E78.00] 2016   • Essential hypertension, benign [I10] 2016   • CVA (cerebral vascular accident) [I63.9] 2016   • Diabetes mellitus without complication [E11.9] 2016      Resolved Hospital Problems    Diagnosis Date Noted Date Resolved   No resolved problems to display.          Hospital Course:  Kristin Magdaleno is a 83 y.o. 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 She presented to [facility] ED on 18 after a fall at home, sustaining multiple closed L rib fractures with development of subcutaneous edema. CT chest revealed mildly displaced posterior L 9th and 10th rib fractures with a small amount of adjacent pleural thickening and minimal atelectasis. Trace intrapleural air and small amount of air along the chest wall without significant pneumothorax or effusion.      Coumadin held overnight on  due to risk of bleeding. Hemoglobin  has remained stable and the patient and her family wished to resume anticoagulation. Pain has been controlled with Nocro 5mg Q4H. PT/OT evaluated/worked with the patient. Case management referred to [facility] per family request and Ms. Magdaleno' case was reviewed. [facility] has accepted the patient and has bed available at this time.    The patient is hemodynamically stable and medically ready to begin rehab services.  Currently, her INR is 2.11.  She will need ongoing monitoring of CBC and PT/INR with next recheck in 1-2 days. She will be discharged today and transferred to [facility] via ambulance.           Day of Discharge     HPI:   Patient lying in bed in no acute distress, spouse at bedside.  Patient reports left side rib pain 5/10 at this time.  Reports pain has been overall well controlled on medication regimen.  Denies any events overnight or this morning.  She had BM yesterday.  She is eager to transfer to [facility] today and states that she has been there 2 times in past and enjoyed her stay there.     Review of Systems  Gen- No fevers, chills  CV- No chest pain, palpitations  Resp- No cough, dyspnea  GI- No N/V/D, abd pain  Musk- L rib pain, worse with inspiration/movement    Otherwise complete ROS is negative except as mentioned in the HPI.    Vital Signs:   Temp:  [97.9 °F (36.6 °C)-98.9 °F (37.2 °C)] 98.9 °F (37.2 °C)  Heart Rate:  [67-72] 68  Resp:  [18] 18  BP: (110-154)/(49-89) 142/58     Physical Exam:  General: Alert, thin, frail female in no acute distress  Head: Normocephalic atraumatic  Eyes: PERRLA, EOMI  ENT: Pink, moist mucous membranes  Neck: Supple, nontender, trachea midline without lymphadenopathy    Cardiovascular: RRR  no M/R/G  +1 DP pulses  Respiratory: Nonlabored, shallow respirations.  Diminished bases bilaterally.   Abdomen: Soft, nontender, nondistended,  positive bowel sounds in all 4 quadrants   Extremities:  negative for edema/cyanosis.    Skin: Pink/warm/dry.    Neuro:  Alert and oriented to person place time and situation, speech is clear, follows all commands, left hemiparesis noted  Psych: Patient is pleasant and cooperative.  Normal affect.    Pertinent Results       Results from last 7 days  Lab Units 02/06/18  0737 02/05/18  0601 02/04/18  1138   WBC 10*3/mm3 9.89 9.71 13.90*   HEMOGLOBIN g/dL 10.5* 10.0* 11.3*   HEMATOCRIT % 32.9* 31.8* 37.2   PLATELETS 10*3/mm3 335 339 377   SODIUM mmol/L  --  138 143   POTASSIUM mmol/L  --  4.0 4.2   CHLORIDE mmol/L  --  104 107   CO2 mmol/L  --  31.0 31.0   BUN mg/dL  --  16 24*   CREATININE mg/dL  --  0.60 0.80   GLUCOSE mg/dL  --  91 114*   CALCIUM mg/dL  --  8.4* 9.0       Results from last 7 days  Lab Units 02/10/18  0426 02/09/18  0703 02/08/18  0600 02/07/18  0554 02/06/18  0737 02/05/18  0601 02/04/18  1138   BILIRUBIN mg/dL  --   --   --   --   --   --  0.4   ALK PHOS U/L  --   --   --   --   --   --  65   ALT (SGPT) U/L  --   --   --   --   --   --  15   AST (SGOT) U/L  --   --   --   --   --   --  18   PROTIME Seconds 22.2* 27.0* 24.9* 16.3* 14.6* 20.7* 27.9*   INR  2.11* 2.41 2.23 1.48 1.33 1.86 2.49           Invalid input(s): TG, LDLREALC    Results from last 7 days  Lab Units 02/05/18  0601 02/04/18  1138   TSH mIU/mL  --  8.216*   HEMOGLOBIN A1C % 5.90*  --      Brief Urine Lab Results     None          Microbiology Results Abnormal     None          Imaging Results (all)     Procedure Component Value Units Date/Time    XR Tibia Fibula 2 View Left [[id number]] Collected:  02/04/18 1637     Updated:  02/05/18 0641    Narrative:       EXAMINATION: XR LEFT TIBIA FIBULA, 2 VIEWS - 02/04/2018     INDICATION: Fall, pain.      COMPARISON: None.     FINDINGS: Left tibia and fibula appear intact along their lengths. Knee  joint and ankle joint appear anatomic. There appears to be some medial  soft tissue thickening about the ankle. There is a suggestion of a small  knee joint effusion.       Impression:       Soft tissue thickening  about the ankle and small knee joint  effusion. No acute bony abnormality is seen.     DICTATED:     02/04/2018  EDITED    :     02/04/2018      This report was finalized on 2/5/2018 6:39 AM by DR. Maximilian Green MD.       XR Pelvis 1 or 2 View [[id number]] Collected:  02/04/18 1636     Updated:  02/05/18 0643    Narrative:       EXAMINATION: XR PELVIS, 1-2 VIEWS - 02/04/2018     INDICATION: Fall, pain.      COMPARISON: None.     FINDINGS: Bones of the pelvis appear osteopenic but intact. Left hip  joint appears essentially normal. Joint spaces are well-maintained.  Right femoral prosthesis appears to be in anatomical alignment. Included  portion of the proximal right femur appears intact.           Impression:       No evidence of acute trauma.     DICTATED:     02/04/2018  EDITED    :     02/04/2018      This report was finalized on 2/5/2018 6:41 AM by DR. Maximilian Green MD.       CT Chest Without Contrast [[id number]] Collected:  02/04/18 1359     Updated:  02/05/18 0752    Narrative:       EXAMINATION: CT CHEST WO CONTRAST - 02/04/2018     INDICATION: Fall, chest pain.     TECHNIQUE: Spiral acquisition 5 mm axial images through the chest and  upper abdomen with coronal 2D reconstructions.     The radiation dose reduction device was turned on for each scan per the  ALARA (As Low as Reasonably Achievable) protocol.     COMPARISON: None.     FINDINGS: The patient's anatomy is distorted due to kyphosis. The most  inferior portions of the ribs are not included on this study. There are,  however, mildly displaced posterior left ninth and tenth rib fractures.  No definite rib fracture is identified elsewhere. There is a small area  of immediately adjacent atelectasis in the left lower lobe, trace  pleural thickening, and a minute amount of air seemingly within the  pleura. A small amount of air extends along the left lateral margin of  the chest wall external to the ribs. There is no evidence of significant  pneumothorax. No  evidence of potential pulmonary contusion is seen.  There are only mild chronic- appearing lung changes elsewhere and a few  granulomatous calcifications.     Mediastinal window images show no evidence of mediastinal mass,  adenopathy or pericardial effusion.     Included images of the upper abdomen show no significant abnormalities  of the visualized portions of the liver, spleen, pancreatic tail, or  adrenal glands. Left kidney contains a bland but intermediate to soft  tissue density dorsal midpole exophytic lesion 17 mm in diameter, much  more typical for proteinaceous cyst than for neoplasm.        Impression:       1. Mildly displaced posterior left ninth and tenth rib fractures. Small  amount of adjacent pleural thickening and minimal atelectasis. Trace  intrapleural air and small amount of air along the chest wall. No  evidence of significant pneumothorax or effusion.  2. No other evidence of acute trauma.  3. Probable small hyperdense left renal cyst. If patient has hematuria,  follow-up with renal ultrasound could be considered for further  characterization.     DICTATED:     02/04/2018  EDITED    :     02/04/2018         This report was finalized on 2/5/2018 7:50 AM by DR. Maximilian Green MD.       XR Chest PA & Lateral [[id number]] Collected:  02/05/18 1219     Updated:  02/05/18 1451    Narrative:       EXAMINATION: XR CHEST, PA AND LATERAL-02/05/2018:      INDICATION: F/U fall/sq air after left rib fractures; S22.42XA-Multiple  fractures of ribs, left side, initial encounter for closed fracture;  S80.12XA-Contusion of left lower leg, initial encounter;  W19.XXXA-Unspecified fall, initial encounter; T79.7XXA-Traumatic  subcutaneous emphysema, initial encounter; Z74.09-Other reduced  mobility.      COMPARISON: NONE.     FINDINGS: PA and lateral views of the chest reveal the cardiac  silhouette to be borderline enlarged. There is patchy ill-defined  opacification seen within the left lung base. Degenerative  changes seen  within the spine. Nondisplaced left lower posterior lateral ribs are  present at least on ribs 7, 8 and 9.           Impression:       Increased markings seen in the left lung base with  nondisplaced fracture seen of at least the left posterior lateral  seventh, eighth and ninth ribs.     D:  02/05/2018  E:  02/05/2018     This report was finalized on 2/5/2018 2:49 PM by Dr. Suma Kasper MD.                      Discharge Details      Kristin Magdaleno   Home Medication Instructions JEANETH:[id number]    Printed on:02/10/18 0916   Medication Information                      atorvastatin (LIPITOR) 40 MG tablet  Take 1 tablet by mouth Daily.             digoxin (LANOXIN) 250 MCG tablet  TAKE 1 TABLET BY MOUTH DAILY.             fluocinonide-emollient (LIDEX-E) 0.05 % cream  Apply  topically 2 (Two) Times a Day. To shins as needed for redness             HYDROcodone-acetaminophen (NORCO) 5-325 MG per tablet  Take 1 tablet by mouth Every 4 (Four) Hours.             losartan-hydrochlorothiazide (HYZAAR) 100-25 MG per tablet  Take 1 tablet by mouth Daily.             metFORMIN (GLUCOPHAGE) 1000 MG tablet  Take 1 tablet by mouth 2 (Two) Times a Day With Meals.             metoprolol succinate XL (TOPROL-XL) 50 MG 24 hr tablet  Take 1 tablet by mouth Daily.             Multiple Vitamins-Minerals (PRESERVISION AREDS 2 PO)  Take 2 capsules by mouth Daily.             pioglitazone (ACTOS) 15 MG tablet  Take 1 tablet by mouth Daily.             polyethylene glycol (MIRALAX) pack packet  Take 17 g by mouth 2 (Two) Times a Day.             senna (SENOKOT) 8.6 MG tablet tablet  Take 2 tablets by mouth 2 (Two) Times a Day.             traZODone (DESYREL) 50 MG tablet  Take 1 tablet by mouth Every Night.             warfarin (COUMADIN) 3 MG tablet  Once per day on Sunday.             warfarin (COUMADIN) 4 MG tablet  Once per day on Mon, Tues, Wed, Thur, Fri and Sat.                   Discharge  Disposition:  Skilled Nursing Facility (DC - External)    Discharge Diet:  Diet Instructions     Diet: Regular; Thin       Discharge Diet:  Regular   Fluid Consistency:  Thin   Dietary Nutritional Supplements:  (boost/ensure)                 Discharge Activity:  Activity Instructions     Activity as Tolerated                     Special Instructions:  Need ongoing monitoring/management of PT/INR and CBC.     No future appointments.    Additional Instructions for the Follow-ups that You Need to Schedule     Discharge Follow-up with PCP    As directed    Follow Up Details:  Follow up with PCP or facility provider in 1-2 days.   Will need ongoing monitoring of INR and CBC.                       Time Spent on Discharge:  40 minutes    Daylin Bennett, JAY  02/10/18  9:16 AM

## 2018-02-10 NOTE — PROGRESS NOTES
"Pharmacy Consult  -  Warfarin    Kristin Magdaleno is an 82 yo woman admitted 2/4/18 after a fall at home, sustaining multiple closed L rib fractures with development of subcutaneous edema. PMH significant for CAD s/p CABG with intraoperative atrial fibrillation/CVA with residual R hemiparesis. Pharmacy consulted to manage resumption of warfarin therapy which was held upon admission due to fall with contusions.       Height - 177.8 cm (70\")  Weight - 55.1 kg (121 lb 8 oz)     Consulting Provider: - ISAEL Barrios  Indication: - Afib  Goal INR:  2-3  Home Regimen:                        - Warfarin 4 mg po on Mon, Tues, Wed, Thurs, Fri and Sat                        - Warfarin 3 mg po on Sundays only     Bridge Therapy: No     Drug-Drug Interactions with current regimen: None at present     Warfarin Dosing During Admission:     Date  2/5  2/6  2/7  2/8  2/9  2/10         INR  1.86  1.33  1.48  2.23  2.41  2.11         Dose  4 mg  6 mg  6mg  4 mg  4 mg  5mg            Labs:    Results from last 7 days   Lab Units 02/10/18  0426 02/09/18  0703 02/08/18  0600 02/07/18  0554 02/06/18  0737 02/05/18  0601 02/04/18  1138   INR  2.11* 2.41 2.23 1.48 1.33 1.86 2.49   HEMOGLOBIN g/dL --  --  --  --  10.5* 10.0* 11.3*   HEMATOCRIT % --  --  --  --  32.9* 31.8* 37.2   PLATELETS 10*3/mm3 --  --  --  --  335 339 377     Diet Order   Procedures   • Diet Regular     Assessment/Plan:     INR remains therapeutic but is starting to decrease to lower end of goal range. Give 5mg dose today.    Pool Rivas [facility]  2/10/2018  9:45 AM               "

## 2018-02-12 ENCOUNTER — PATIENT OUTREACH (OUTPATIENT)
Dept: CASE MANAGEMENT | Facility: OTHER | Age: 83
End: 2018-02-12

## 2018-02-12 ENCOUNTER — EPISODE CHANGES (OUTPATIENT)
Dept: CASE MANAGEMENT | Facility: OTHER | Age: 83
End: 2018-02-12

## 2018-02-12 NOTE — OUTREACH NOTE
Skilled Nursing Facility Discharge Flowsheet:     Skilled Nursing Facility Discharge Assessment 2/12/2018   Acute Facility Discharged From [place]   Acute Discharge Date 2/10/2018   Name of the Skilled Nursing Facility? [facility]   Tier Level of the Skilled Nursing Facility 3   Purpose of SNF Admission PT;OT;SN   Estimated length of stay for the patient? Tentatively 3 weeks   Who is the insurance provider or payor of patient stay? Medicare   Progression of Patient? Received secure e-mail from Zofia Hameed/ [facility] . States patient to continue with SN, PT, OT with estimated 3 weeks estimated length of stay. No discharge date at this time.

## 2018-02-20 DIAGNOSIS — I10 ESSENTIAL HYPERTENSION: ICD-10-CM

## 2018-02-20 RX ORDER — HYDROCHLOROTHIAZIDE 12.5 MG/1
CAPSULE, GELATIN COATED ORAL
Qty: 30 CAPSULE | Refills: 5 | Status: SHIPPED | OUTPATIENT
Start: 2018-02-20 | End: 2018-03-05 | Stop reason: ALTCHOICE

## 2018-02-21 ENCOUNTER — PATIENT OUTREACH (OUTPATIENT)
Dept: CASE MANAGEMENT | Facility: OTHER | Age: 83
End: 2018-02-21

## 2018-02-21 NOTE — OUTREACH NOTE
Skilled Nursing Facility Discharge Flowsheet:     Skilled Nursing Facility Discharge Assessment 2/21/2018   Acute Facility Discharged From [facility]   Acute Discharge Date 2/10/2018   Name of the Skilled Nursing Facility? [facility]   Tier Level of the Skilled Nursing Facility 3   Purpose of SNF Admission PT;OT;SN   Estimated length of stay for the patient? Tentatively 3 weeks from 2/10/18   Who is the insurance provider or payor of patient stay? -   Progression of Patient? Patient remains at [facility]. On 2/12/18 received secure e-mail from Zofia Hameed/ [facility] . Patient to continue with SN, PT, OT with 3 weeks estimated length of stay.

## 2018-02-26 ENCOUNTER — PATIENT OUTREACH (OUTPATIENT)
Dept: CASE MANAGEMENT | Facility: OTHER | Age: 83
End: 2018-02-26

## 2018-02-26 NOTE — OUTREACH NOTE
Skilled Nursing Facility Discharge Flowsheet:     Skilled Nursing Facility Discharge Assessment 2/26/2018   Acute Facility Discharged From [place]   Acute Discharge Date 2/10/2018   Name of the Skilled Nursing Facility? [facility]   Tier Level of the Skilled Nursing Facility 3   Purpose of SNF Admission PT;OT;SN   Estimated length of stay for the patient? Patient discharged to home on 2/25/18   Who is the insurance provider or payor of patient stay? Medicare   Progression of Patient? Received secure e-mail from Zofia Hameed/ [facility] from [facility] that patient discharged 2/25/18 to home with [facility].    Skilled Nursing Discharge Date? 2/25/2018   Where was the patient discharged to? Home   Home Health Agency at discharge? Yes   Name of Home Health Agency? [facility]

## 2018-02-26 NOTE — OUTREACH NOTE
Talked with patient's spouse. States patient discharged home from [facility] on 2/25/18 and contacted by [facility] who will see her today. He states patient has medications; and follow up appointment with Dr Guillaume PCP on Wednesday. Reviewed briefly HR program. He verbalized understanding. No further questions or concerns voiced at this time.

## 2018-02-27 ENCOUNTER — TELEPHONE (OUTPATIENT)
Dept: INTERNAL MEDICINE | Facility: CLINIC | Age: 83
End: 2018-02-27

## 2018-02-27 NOTE — TELEPHONE ENCOUNTER
----- Message from Maryann Guillaume MD sent at 2/27/2018  7:28 AM EST -----  Regarding: [facility] records  She has been at [facility] recently - can we get records?

## 2018-03-01 ENCOUNTER — EPISODE CHANGES (OUTPATIENT)
Dept: CASE MANAGEMENT | Facility: OTHER | Age: 83
End: 2018-03-01

## 2018-03-02 ENCOUNTER — TELEPHONE (OUTPATIENT)
Dept: INTERNAL MEDICINE | Facility: CLINIC | Age: 83
End: 2018-03-02

## 2018-03-02 NOTE — TELEPHONE ENCOUNTER
----- Message from Maryann Guillaume MD sent at 3/2/2018  7:39 AM EST -----  Regarding: [facility] dc summary  It looks like we got the H&P, but not the DC summary yet from [facility]

## 2018-03-05 ENCOUNTER — OFFICE VISIT (OUTPATIENT)
Dept: INTERNAL MEDICINE | Facility: CLINIC | Age: 83
End: 2018-03-05

## 2018-03-05 VITALS
DIASTOLIC BLOOD PRESSURE: 64 MMHG | OXYGEN SATURATION: 96 % | HEART RATE: 70 BPM | TEMPERATURE: 98.7 F | SYSTOLIC BLOOD PRESSURE: 128 MMHG

## 2018-03-05 DIAGNOSIS — I48.91 ATRIAL FIBRILLATION, UNSPECIFIED TYPE (HCC): ICD-10-CM

## 2018-03-05 DIAGNOSIS — S22.42XD CLOSED FRACTURE OF MULTIPLE RIBS OF LEFT SIDE WITH ROUTINE HEALING, SUBSEQUENT ENCOUNTER: Primary | ICD-10-CM

## 2018-03-05 DIAGNOSIS — E11.9 DIABETES MELLITUS WITHOUT COMPLICATION (HCC): ICD-10-CM

## 2018-03-05 DIAGNOSIS — I10 ESSENTIAL HYPERTENSION, BENIGN: ICD-10-CM

## 2018-03-05 DIAGNOSIS — R60.0 EDEMA OF BOTH LOWER LEGS DUE TO PERIPHERAL VENOUS INSUFFICIENCY: ICD-10-CM

## 2018-03-05 DIAGNOSIS — K59.03 DRUG-INDUCED CONSTIPATION: ICD-10-CM

## 2018-03-05 DIAGNOSIS — I87.2 EDEMA OF BOTH LOWER LEGS DUE TO PERIPHERAL VENOUS INSUFFICIENCY: ICD-10-CM

## 2018-03-05 LAB — INR PPP: 3 (ref 2–3)

## 2018-03-05 PROCEDURE — 99214 OFFICE O/P EST MOD 30 MIN: CPT | Performed by: INTERNAL MEDICINE

## 2018-03-05 PROCEDURE — 85610 PROTHROMBIN TIME: CPT | Performed by: INTERNAL MEDICINE

## 2018-03-05 RX ORDER — HYDROCODONE BITARTRATE AND ACETAMINOPHEN 5; 325 MG/1; MG/1
1 TABLET ORAL EVERY 4 HOURS
Qty: 20 TABLET | Refills: 0 | Status: SHIPPED | OUTPATIENT
Start: 2018-03-05 | End: 2018-01-01

## 2018-03-05 NOTE — PROGRESS NOTES
.HPI:    Pt was admitted to [facility] last month after a fall for rib fractures (L 9th and 10th)and pneumothorax. After discharge from [facility], she was at [facility] until 2/24. She has been doing OK at home. Still some left sided chest wall pain, but has improved.  She is taking norco 5mg prn, about 3/day. Not SOB    htn- metoprolol was increased to 50mg while at [facility]    Swelling was better at  and [facility], but since she has been home, has increased gradually. R leg is worse than L (has right-sided weakness from CVA). She does have recliner now but doesn't always keep feet propped up. She had trouble getting compression stockings on. She sits most of the day    DMT2 - a1c was 5.9 last month. sugars have been well-controlled    PMH, SH, FH reviewed and updated  Meds and allergies: reviewed and updated, see chart    ROS:  Gen: wt loss. Has started glycerna to help w/ weight  GI: has been constipated w/ the norco; not on any laxatives; no pain  : she had been treated w/ cipro and took 3 days  CV: chest wall pain from fall/rib fractures; a fib  Pulm: no SOB  GI: more constipation w/ norco. 1 bm in last week; not on any laxatives  Neuro: R sided weakness  Hem: coumadin dose raised to 4mg qd at     PE:  Vitals:    03/05/18 1059   BP: 128/64   BP Location: Left arm   Pulse: 70   Temp: 98.7 °F (37.1 °C)   TempSrc: Temporal Artery    SpO2: 96%     Physical Exam   Constitutional: oriented to person, place, and time.. No distress. Daughter helps w/ history today  HENT:   Head: Normocephalic and atraumatic.   Eyes: Conjunctivae and EOM are normal.   Cardiovascular: Normal rate, regular rhythm today (h/o a fib)and normal heart sounds.  Exam reveals no gallop and no friction rub.    + murmur heard. Chest wall tender L lateral  Pulmonary/Chest: Effort normal and breath sounds normal. No respiratory distress.   no wheezes.   Neurological:  alert and oriented to person, place, and time.   Skin: Skin is warm and dry.  not diaphoretic. No bruising  Psychiatric:  normal mood and affect. behavior is normal. Judgment and thought content normal.   PV: trace edema L leg, 1+ edema R leg (daughter says it hhas been worse than this)    INR 3.0    A/P:  1. Rib fractures from fall - pain improved. norco refilled today, and she will try to decrease use gradually. norco 5 1 q6 hrs prn #20/NR. Controlled substance contract reviewed and signed by pt. Adverse effects and risks of addiction of medication reviewed with pt. Ace done today and appropriate.   2. H/o CVA w/ R leg weakness - order for new brace given  3. Constipation - add bid docusate and miralax daily as needed  4. LE edema B likely from inactivity/sitting all day. Will d/c the actos and see if it improves as DMT2 has been well controlled. If no better, daughter will call and we could add prn lasix. Try to keep legs elevated and try to do some leg exercises at least w/ the left leg. Compression stockings are too difficult for her to get on  5. dmt2 - recheck a1c off the actos in 5/18  6. A fib - continue current coumadin dose; recheck INR in 3 weeks - will see if home health can do this

## 2018-03-06 ENCOUNTER — TELEPHONE (OUTPATIENT)
Dept: INTERNAL MEDICINE | Facility: CLINIC | Age: 83
End: 2018-03-06

## 2018-03-06 NOTE — TELEPHONE ENCOUNTER
----- Message from Maryann Guillaume MD sent at 3/5/2018  9:55 PM EST -----  Regarding: [facility] health  Can we give them order to do INR in 3 weeks for a fib

## 2018-03-14 ENCOUNTER — TELEPHONE (OUTPATIENT)
Dept: INTERNAL MEDICINE | Facility: CLINIC | Age: 83
End: 2018-03-14

## 2018-03-14 NOTE — TELEPHONE ENCOUNTER
BUSTER WAS CALLING REGARDING A WOUND ON HER BOTTOM THAT SHE HAS BEEN HAVING FOR A WHILE, SHE HAS PUT DRESSING ON It, SHE WOULD LIKE TO KNOW IF IT IS OK TO USE BETADINE PREP

## 2018-03-22 ENCOUNTER — TELEPHONE (OUTPATIENT)
Dept: INTERNAL MEDICINE | Facility: CLINIC | Age: 83
End: 2018-03-22

## 2018-03-22 NOTE — TELEPHONE ENCOUNTER
Ms. Magdaleno daughter called needing INR order sent to [facility] before Monday. If you have any questions call [phone] Sara Bah

## 2018-03-23 ENCOUNTER — EPISODE CHANGES (OUTPATIENT)
Dept: CASE MANAGEMENT | Facility: OTHER | Age: 83
End: 2018-03-23

## 2018-03-23 ENCOUNTER — PATIENT OUTREACH (OUTPATIENT)
Dept: CASE MANAGEMENT | Facility: OTHER | Age: 83
End: 2018-03-23

## 2018-03-23 ENCOUNTER — TELEPHONE (OUTPATIENT)
Dept: INTERNAL MEDICINE | Facility: CLINIC | Age: 83
End: 2018-03-23

## 2018-03-23 NOTE — OUTREACH NOTE
Talked with patient's . Patient continues receiving [facility] of  . Patient lives with ; and he and family assist her with ADL's. He reports patient has good appetite; drinks Ensure; sleeping well in reclining chair, Patient's  assists with medications and transfers to wheelchair. Reviewed with  patient's care. No further questions or concerns voiced at this time.

## 2018-03-28 RX ORDER — ATORVASTATIN CALCIUM 40 MG/1
40 TABLET, FILM COATED ORAL DAILY
Qty: 90 TABLET | Refills: 1 | OUTPATIENT
Start: 2018-03-28

## 2018-04-02 ENCOUNTER — TELEPHONE (OUTPATIENT)
Dept: INTERNAL MEDICINE | Facility: CLINIC | Age: 83
End: 2018-04-02

## 2018-04-02 DIAGNOSIS — L89.300 DECUBITUS ULCER OF BUTTOCK, UNSTAGEABLE, UNSPECIFIED LATERALITY: Primary | ICD-10-CM

## 2018-04-02 RX ORDER — METOPROLOL SUCCINATE 50 MG/1
50 TABLET, EXTENDED RELEASE ORAL DAILY
Qty: 30 TABLET | Refills: 5 | Status: SHIPPED | OUTPATIENT
Start: 2018-04-02 | End: 2018-01-01

## 2018-04-02 NOTE — TELEPHONE ENCOUNTER
Yes that would be fine to try the santly. We can also refer to wound care center at  if they don't feel she is making progress

## 2018-04-02 NOTE — TELEPHONE ENCOUNTER
Ronel [phone].  Ms. Magdaleno has a place on her buttocks that is 2 cm x 1.5 cm it is deeper red with some yellow.  They have been treating with with zero form dressing and methoplex.  She wanted to know if you wanted to try something like santyl or what do you think?  She also needs a refill of the metoprolol 50 mg sent to the pharmacy.

## 2018-04-17 NOTE — TELEPHONE ENCOUNTER
----- Message from Maryann Guillaume MD sent at 4/17/2018 12:33 PM EDT -----  Regarding: BP and INR  I sent paulette a message regarding the status of the wound care appt.    For the BP, let's have her go up to 1 1/2 of the 50mg metoprolol and see how BP and pulse do with the higher dose    Ok to send the order for the INR too

## 2018-04-17 NOTE — TELEPHONE ENCOUNTER
Hoa from [facility] called to say her BP has been elevated.  She wants to know if she can double up her metoprolol from 50 mg to 100 mg.  Today it was 179/89.  Also they have not heard anything from  Wound Care.  Can we check in to that.  She also needs an order faxed to  for her PT INR check for tomorrow.

## 2018-04-18 NOTE — TELEPHONE ENCOUNTER
It is a little low. I believe she is on 4mg daily. Does she have some 1mg's at home? If so, we could have her do 4mg daily 5days a week, and 5mg a day M and F.  If she only has the 4mg's, we could do 4 daily, except 6mg once a week

## 2018-04-18 NOTE — TELEPHONE ENCOUNTER
Notified Mr. Magdaleno.  She has 1mg tablets at home.  She is going to take 4 mg x 5 days and on M & F do 5 mg.  When do you want her to recheck?

## 2018-05-03 NOTE — TELEPHONE ENCOUNTER
----- Message from Maryann Guillaume MD sent at 5/3/2018  3:37 PM EDT -----  Regarding: RE: INR  Let's have her take 5mg m,w,f, and 4mg the other days and recheck in 2 weeks  ----- Message -----  From: Hoa Willson  Sent: 5/3/2018   3:19 PM  To: Maryann Guillaume MD  Subject: INR                                              Roseline from [facility] called to give INR results.  Result of 1.7.  If any changes to med call pt home.

## 2018-05-08 PROBLEM — R60.0 LOWER EXTREMITY EDEMA: Status: ACTIVE | Noted: 2018-01-01

## 2018-05-08 NOTE — PROGRESS NOTES
History of Present Illness   Here for f/u on:    Rib fractures - had been on norco but has not filled since March. Pain has resolved, and no SOB    htn - she is on metoprolol 50mg 1/12 qd, but has not helped w/ BP. Also taking hyzaar daily.  is with her today and he says BP stays in 140-160/high 80s range. They check regualrly    LE edema - R leg is worse than L (has right-sided weakness from CVA). She does have recliner now but doesn't always keep feet propped up. She had trouble getting compression stockings on. She sits most of the day  In March we d/c'ed actos to see if swelling would improve, and she did stop it but no help. Diet is not great. She does eat foods with quite a bit of sodium    Allergies this sprin- sneezing mostly, not as much runny nose. No AHs yet, but used to take benadryl and worked well.    DMT2 - a1c was 5.9 in 2/18    The following portions of the patient's history were reviewed and updated as appropriate: allergies, current medications, past family history, past medical history, past social history, past surgical history and problem list.    Review of Systems   Constitutional: + fatigue, neg fever.   Respiratory: Negative for shortness of breath.  neg cough  Cardiovascular: Negative for chest pain.   Gastrointestinal: Negative for abdominal pain. constipation resolved off narcotics  PV: swelling B LE  Neuro: r sided weakness from CVA      Objective    Vitals:    05/09/18 0917   BP: 150/88   Pulse: 94   Temp: 99 °F (37.2 °C)   SpO2: 96%     Physical Exam   Constitutional: oriented to person, place, and time. well-developed and well-nourished. No distress.   HENT:   Head: Normocephalic and atraumatic.   Eyes: Conjunctivae and EOM are normal.   Cardiovascular: Normal rate, regular rhythm and normal heart sounds.  Exam reveals no gallop and no friction rub.  Regular rate about 100 today  +murmur  Pulmonary/Chest: Effort normal and breath sounds normal. No respiratory distress.  no  wheezes. no chest wall tenderness  Abd: soft, NTND  Neurological:  alert and oriented to person, place, and time.   Skin: Skin is warm and dry. not diaphoretic.   Psychiatric: normal mood and affect. Behavior and judgement normal  PV: trace edema L leg, 1+ edema R leg     a1c 5.9    Assessment/Plan   Kristin was seen today for follow-up and allergies.    Diagnoses and all orders for this visit:    Essential hypertension, benign - not controlled. Will raise toprol xl. They will call in next 2 weeks if no improvement. We may need to add norvasc back, since swelling isn't better off of it anyway  -     metoprolol succinate XL (TOPROL XL) 100 MG 24 hr tablet; Take 1 tablet by mouth Daily.      Pure hypercholesterolemia - on lipitor  -     CBC (No Diff)  -     Comprehensive Metabolic Panel  -     Lipid Panel    Cerebrovascular accident (CVA) on coumadin  -  Diabetes mellitus without complication - well-controlled on just metformin. Will check urine micro next visit  -     POC Glycosylated Hemoglobin (Hb A1C)    Lower extremity edema - pt is unwilling to try compression stockings, encouraged her to try to prop legs up and try to decrease Na intake. We will check UA and spot albumin next visit as well. We discussed trial of lasix, but she declines    Chronic atrial fibrillation - on coumadin

## 2018-05-15 NOTE — TELEPHONE ENCOUNTER
Ms. LOWE INR: 2.0    PROTIME: 24.2       CUMADIN 5 G 3X A WEEK, 4G OF CUMADIN 4 DAYS A WEEK. PLEASE CALL

## 2018-05-29 NOTE — TELEPHONE ENCOUNTER
That sounds better than it had been. Does he think the higher dose on the metoprolol has helped some?

## 2018-06-06 NOTE — TELEPHONE ENCOUNTER
Spoke with Ronel.  She had compression wraps on for edema.  Can that be discontinued.  She has no swelling, redness and I let her know if there is no need for them and patient is okay with removing them then that would be okay.  She says  knows how to wrap if she needs to have them wrapped in the future.  She will do an INR on her Monday and then she will be discharged.

## 2018-07-10 NOTE — TELEPHONE ENCOUNTER
----- Message from Maryann Guillaume MD sent at 7/10/2018  2:32 PM EDT -----      ----- Message -----  From: Ning Segura MA  Sent: 7/10/2018   9:10 AM  To: Maryann Guillaume MD    INR today is 2.3.  She is taking 4 mg x 4 days and 5 mg x 3 days.

## 2018-08-09 NOTE — PROGRESS NOTES
Subjective   Kristin Magdaleno is a 83 y.o. female.     History of Present Illness   Here for f/u on:    htn - has not checked recently but has felt better    LE edema - R leg more than L (has right-sided weakness from CVA). family has been using a medicated gauze wrap 2x/week and this has helped.     DMT2 - a1c was 5.9 in 2/18    Wt loss - family feels like weight is still down, and appetite still not great. She is doing a Boost or Ensure 2.day    The following portions of the patient's history were reviewed and updated as appropriate: allergies, current medications, past family history, past medical history, past social history, past surgical history and problem list.    Review of Systems  Constitutional: + fatigue, neg fever. + wt loss, +decreased appetite  Respiratory: Negative for shortness of breath.  neg cough  Cardiovascular: Negative for chest pain. Rib pain has resolved  Gastrointestinal: Negative for abdominal pain. No diarrhea. Every other day BM  PV: swelling B LE is better  Neuro: r sided weakness from CVA; no recent falls  Skin: several SKs, seeing derm    Objective   Physical Exam   Vitals:    08/09/18 0947   BP: 132/82   BP Location: Left arm   Pulse: 68   Temp: 99.4 °F (37.4 °C)   TempSrc: Temporal Artery    SpO2: 97%     Constitutional: oriented to person, place, and time. well-developed and well-nourished. No distress.   HENT:   Head: Normocephalic and atraumatic.   Eyes: Conjunctivae and EOM are normal.   Cardiovascular: Normal rate, regular rhythm and normal heart sounds.  Exam reveals no gallop and no friction rub.  Regular rate +murmur  Pulmonary/Chest: Effort normal and breath sounds normal. No respiratory distress.  no wheezes. no chest wall tenderness  Abd: soft, NTND  Neurological:  alert and oriented to person, place, and time.   Skin: Skin is warm and dry. not diaphoretic. +SKs on face  Psychiatric: normal mood and affect. Behavior and judgement normal  PV: trace edema L leg, 1+ edema R  leg     a1c 6.0  INR 3.0    Assessment/Plan   Kristin was seen today for hypertension, follow-up, atrial fibrillation and diabetes.    Diagnoses and all orders for this visit:    Atrial fibrillation, unspecified type (CMS/HCC) - on coumadin. INR therapeutic today, recheck in 4 weeks  -     POC INR    Pure hypercholesterolemia - recheck in 3m      Diabetes mellitus without complication (CMS/HCC) - well-controlled, continue current regimen  -     POC Glycosylated Hemoglobin (Hb A1C)    Lower extremity edema - better w/ wraps    HTN - good control

## 2018-08-09 NOTE — OUTREACH NOTE
Care Management Plan 8/9/2018   Lifestyle Goals Routine follow-up with doctor(s)   Barriers Disease education   Self Management Medication Adherence   Annual Wellness Visit:  Patient Refuses at This Time   Care Gaps Addressed Other (See Comment)   Care Gaps Addressed [facility]   Specific Disease Process Teaching Hypertension;Diabetes   Does patient have depression diagnosis? No   Advanced Directives: Not Interested At This Time   Ed Visits past 12 months: 2 or 3   Hospitalizations past 12 months 1     The main concerns and/or symptoms the patient would like to address are: Talked with patient and patient's . Patient has finished with home health services. Patient lives with spouse; receiving assistance from spouse and daughters regarding ADL's; meal preparation; housekeeping and transportation. Patient states to use wheelchair due to CVA . Patient states to be compliant with medications; Coumadin; appointments and monitoring of blood pressure and blood sugars. States readings within normal limits. Patient reports to have decreased appetite and drinks 2 cans of Ensure or Boost per day. She reports no difficulty with sleeping or breathing.     Education/instruction provided by Care Coordinator: Reviewed with patient and patient's  24/7 Nurse Line Telephone number; [place] contact information; Advance Directives; Medicare Annual Wellness Visit and Care Advising program. Patient and patient's  verbalized understanding. No further questions or concerns voiced at this time.     Follow Up Outreach Due: Follow up as needed.     Courtney Taylor RN

## 2018-09-06 NOTE — TELEPHONE ENCOUNTER
----- Message from Maryann Guillaume MD sent at 9/6/2018 10:42 AM EDT -----      ----- Message -----  From: Ning Segura MA  Sent: 9/6/2018   8:58 AM  To: Maryann Guillaume MD    INR today is 4.1, taking 5 mg x 3 days and 4 mg x 4 days.

## 2018-09-06 NOTE — PROGRESS NOTES
Let's have her hold tonight, then go down to 5mg 2 days and 4mg 5 days a week, and recheck in 1 week

## 2018-09-06 NOTE — TELEPHONE ENCOUNTER
Mr. Magdaleno was notified for her to hold the coumadin tonight.  Then she can do 5 mg x 2 days and 4 mg x 5 days.  Will recheck in one week.

## 2018-09-13 NOTE — TELEPHONE ENCOUNTER
----- Message from Maryann Guillaume MD sent at 9/13/2018 12:37 PM EDT -----      ----- Message -----  From: Ning Segura MA  Sent: 9/13/2018   8:58 AM  To: Maryann Guillaume MD    2.1 INR today taking 4 mg x 5 days and 5 mg x 2 days

## 2018-09-21 NOTE — TELEPHONE ENCOUNTER
Scheduled annual wellness exam. Pt agreeable to stay for wellness visit w/ ABDOULAYE VELASCO on 11/14/18 @ 10:15a,  after her previously scheduled 9:45a f/up appt w/ Dr. Guillaume.

## 2018-10-11 NOTE — TELEPHONE ENCOUNTER
PN to hold tonight and then do 4 mg x 2 weeks.  Will recheck in 2 weeks.  I spoke to Mrs. Magdaleno and her  both.

## 2018-10-11 NOTE — PROGRESS NOTES
Let's have her hold tonight, then go down to 4mg every day, and not do the 5mg. Let's recheck in 2 weeks

## 2018-10-11 NOTE — TELEPHONE ENCOUNTER
----- Message from Maryann Guillaume MD sent at 10/11/2018  9:22 AM EDT -----      ----- Message -----  From: Ning Segura MA  Sent: 10/11/2018   8:49 AM  To: Maryann Guillauem MD    INR today is 4.6, she is currently taking 4 mg x 5 days and 5 mg x 2 days.

## 2018-10-25 NOTE — TELEPHONE ENCOUNTER
----- Message from Maryann Guillaume MD sent at 10/25/2018 11:22 AM EDT -----      ----- Message -----  From: Ning Segura MA  Sent: 10/25/2018   8:58 AM  To: Maryann Guillaume MD    INR today is 2.4 and she is taking 4 mg daily.

## 2018-11-13 NOTE — PROGRESS NOTES
"Subjective   Kristin Magdaleno is a 83 y.o. female.     History of Present Illness   Here for f/u on:  htn -  has not checked BP recently     LE edema - about the same - R leg more than L (has right-sided weakness from CVA). family has been using a medicated gauze wrap 2x/week and this has helped.      DMT2 - a1c was 6.0 3 months ago     Wt loss - pt feels like weight is still down, and appetite still not great. She is doing a Boost or Ensure 2.day.  She is on D but no MVI. She denies any abdominal pain but just doesn't have much appetite    The following portions of the patient's history were reviewed and updated as appropriate: allergies, current medications, past family history, past medical history, past social history, past surgical history and problem list.    Review of Systems   Constitutional: Positive for appetite change, fatigue and unexpected weight loss. Negative for activity change (sedentary) and unexpected weight gain.   Respiratory: Negative for cough and shortness of breath.    Cardiovascular: Positive for leg swelling. Negative for chest pain and palpitations.   Gastrointestinal: Negative for blood in stool, constipation (every other day) and diarrhea.   Allergic/Immunologic: Negative for immunocompromised state.   Neurological: Positive for weakness.       Objective   Physical Exam   Vitals:    11/14/18 0959   BP: 140/68   BP Location: Left arm   Pulse: 83   Temp: 97.5 °F (36.4 °C)   TempSrc: Temporal   SpO2: 98%   Height: 177.8 cm (70\")     Constitutional: oriented to person, place, and time. Thin, elderly lady.  No distress.   HENT:   Head: Normocephalic and atraumatic.   Eyes: Conjunctivae and EOM are normal.   Cardiovascular: Normal rate, regular rhythm and normal heart sounds.  Exam reveals no gallop and no friction rub.  Regular rate today;  +systolic murmur  Pulmonary/Chest: Effort normal and breath sounds normal. No respiratory distress.  no wheezes. no chest wall tenderness  Abd: soft, " NTND  Neurological:  alert and oriented to person, place, and time. R leg weakness  Skin: Skin is warm and dry. not diaphoretic. +SKs on face and back  Psychiatric: normal mood and affect. Behavior and judgement normal  PV: trace edema L leg, 1+ edema R leg     Assessment/Plan   Kristin was seen today for hypertension, atrial fibrillation and hyperlipidemia.    Diagnoses and all orders for this visit:    Essential hypertension, benign - fair control  -     Pure hypercholesterolemia - check today  -     CBC & Differential  -     Comprehensive Metabolic Panel  -     Lipid Panel  -     CBC Auto Differential    Paroxysmal atrial fibrillation (CMS/HCC) - INR therapeutic - recheck in 4 weeks  -     POC INR    Diabetes mellitus without complication (CMS/HCC) - has been well controlled. She is unable to leave urine for albumin  -     Hemoglobin A1c      Lower extremity edema - stable          Prev: she had flu vaccine. She declines wellness exam. Discussed getting hep A at pharmacy

## 2018-12-12 NOTE — TELEPHONE ENCOUNTER
----- Message from Maryann Guillaume MD sent at 12/12/2018  9:25 AM EST -----      ----- Message -----  From: Ning Segura MA  Sent: 12/12/2018   9:20 AM  To: Maryann Guillaume MD    INR today is 2.4 and she is taking 4 mg qd.

## 2018-12-12 NOTE — TELEPHONE ENCOUNTER
Mr. Magdaleno has been notified to leave her dose the same at 4 mg daily and we will recheck in 4 weeks.

## 2019-01-01 ENCOUNTER — TELEPHONE (OUTPATIENT)
Dept: INTERNAL MEDICINE | Facility: CLINIC | Age: 84
End: 2019-01-01

## 2019-01-01 ENCOUNTER — APPOINTMENT (OUTPATIENT)
Dept: CARDIOLOGY | Facility: HOSPITAL | Age: 84
End: 2019-01-01

## 2019-01-01 ENCOUNTER — APPOINTMENT (OUTPATIENT)
Dept: GENERAL RADIOLOGY | Facility: HOSPITAL | Age: 84
End: 2019-01-01

## 2019-01-01 ENCOUNTER — HOSPITAL ENCOUNTER (INPATIENT)
Facility: HOSPITAL | Age: 84
LOS: 1 days | Discharge: HOSPICE | End: 2019-04-14
Attending: EMERGENCY MEDICINE | Admitting: INTERNAL MEDICINE

## 2019-01-01 ENCOUNTER — HOSPITAL ENCOUNTER (INPATIENT)
Facility: HOSPITAL | Age: 84
LOS: 7 days | Discharge: NURSING, REHAB OR LONG-TERM CARE | End: 2019-04-11
Attending: EMERGENCY MEDICINE | Admitting: INTERNAL MEDICINE

## 2019-01-01 ENCOUNTER — APPOINTMENT (OUTPATIENT)
Dept: CT IMAGING | Facility: HOSPITAL | Age: 84
End: 2019-01-01

## 2019-01-01 ENCOUNTER — HOSPITAL ENCOUNTER (EMERGENCY)
Facility: HOSPITAL | Age: 84
Discharge: HOME | End: 2019-04-13
Attending: EMERGENCY MEDICINE | Admitting: EMERGENCY MEDICINE

## 2019-01-01 ENCOUNTER — HOSPITAL ENCOUNTER (INPATIENT)
Facility: HOSPITAL | Age: 84
LOS: 1 days | End: 2019-04-14
Attending: INTERNAL MEDICINE | Admitting: INTERNAL MEDICINE

## 2019-01-01 ENCOUNTER — EPISODE CHANGES (OUTPATIENT)
Dept: CASE MANAGEMENT | Facility: OTHER | Age: 84
End: 2019-01-01

## 2019-01-01 VITALS
HEIGHT: 70 IN | DIASTOLIC BLOOD PRESSURE: 74 MMHG | BODY MASS INDEX: 14.17 KG/M2 | SYSTOLIC BLOOD PRESSURE: 134 MMHG | TEMPERATURE: 98 F | WEIGHT: 99 LBS | HEART RATE: 88 BPM | OXYGEN SATURATION: 94 % | RESPIRATION RATE: 16 BRPM

## 2019-01-01 VITALS
TEMPERATURE: 98.2 F | RESPIRATION RATE: 18 BRPM | BODY MASS INDEX: 15.92 KG/M2 | OXYGEN SATURATION: 92 % | DIASTOLIC BLOOD PRESSURE: 74 MMHG | WEIGHT: 111.2 LBS | SYSTOLIC BLOOD PRESSURE: 139 MMHG | HEART RATE: 88 BPM | HEIGHT: 70 IN

## 2019-01-01 VITALS
BODY MASS INDEX: 14.32 KG/M2 | HEART RATE: 95 BPM | WEIGHT: 100 LBS | SYSTOLIC BLOOD PRESSURE: 133 MMHG | DIASTOLIC BLOOD PRESSURE: 70 MMHG | HEIGHT: 70 IN | TEMPERATURE: 98.5 F | RESPIRATION RATE: 20 BRPM | OXYGEN SATURATION: 99 %

## 2019-01-01 VITALS — BODY MASS INDEX: 14.17 KG/M2 | WEIGHT: 98.99 LBS | HEIGHT: 70 IN

## 2019-01-01 DIAGNOSIS — R06.03 ACUTE RESPIRATORY DISTRESS: Primary | ICD-10-CM

## 2019-01-01 DIAGNOSIS — T46.0X1A POISONING BY DIGOXIN, ACCIDENTAL OR UNINTENTIONAL, INITIAL ENCOUNTER: ICD-10-CM

## 2019-01-01 DIAGNOSIS — S00.01XA ABRASION OF SCALP, INITIAL ENCOUNTER: ICD-10-CM

## 2019-01-01 DIAGNOSIS — R13.10 DYSPHAGIA, UNSPECIFIED TYPE: ICD-10-CM

## 2019-01-01 DIAGNOSIS — Z86.69 HISTORY OF HEMIPARESIS: ICD-10-CM

## 2019-01-01 DIAGNOSIS — S00.03XA CONTUSION OF SCALP, INITIAL ENCOUNTER: ICD-10-CM

## 2019-01-01 DIAGNOSIS — Z74.09 IMPAIRED MOBILITY AND ADLS: ICD-10-CM
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
## 2019-01-01 DIAGNOSIS — J96.21 ACUTE ON CHRONIC RESPIRATORY FAILURE WITH HYPOXIA AND HYPERCAPNIA (HCC): Primary | ICD-10-CM

## 2019-01-01 DIAGNOSIS — Z66 DNR (DO NOT RESUSCITATE): ICD-10-CM

## 2019-01-01 DIAGNOSIS — Z86.79 HISTORY OF ATRIAL FIBRILLATION: ICD-10-CM

## 2019-01-01 DIAGNOSIS — J90 BILATERAL PLEURAL EFFUSION: ICD-10-CM

## 2019-01-01 DIAGNOSIS — J96.22 ACUTE ON CHRONIC RESPIRATORY FAILURE WITH HYPOXIA AND HYPERCAPNIA (HCC): Primary | ICD-10-CM

## 2019-01-01 DIAGNOSIS — Z74.09 IMPAIRED FUNCTIONAL MOBILITY, BALANCE, GAIT, AND ENDURANCE: ICD-10-CM

## 2019-01-01 DIAGNOSIS — R77.8 ELEVATED TROPONIN: ICD-10-CM

## 2019-01-01 DIAGNOSIS — J93.9 PNEUMOTHORAX ON RIGHT: ICD-10-CM

## 2019-01-01 DIAGNOSIS — R91.8 OPACITY OF LUNG ON IMAGING STUDY: ICD-10-CM

## 2019-01-01 DIAGNOSIS — S09.90XA INJURY OF HEAD, INITIAL ENCOUNTER: ICD-10-CM
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
## 2019-01-01 DIAGNOSIS — S01.01XA SCALP LACERATION, INITIAL ENCOUNTER: ICD-10-CM

## 2019-01-01 DIAGNOSIS — I50.23 ACUTE ON CHRONIC SYSTOLIC CONGESTIVE HEART FAILURE (HCC): ICD-10-CM

## 2019-01-01 DIAGNOSIS — Z78.9 IMPAIRED MOBILITY AND ADLS: ICD-10-CM

## 2019-01-01 DIAGNOSIS — Z79.01 ANTICOAGULATED BY ANTICOAGULATION TREATMENT: ICD-10-CM

## 2019-01-01 LAB
ALBUMIN SERPL-MCNC: 2.89 G/DL (ref 3.2–4.8)
ALBUMIN SERPL-MCNC: 3.02 G/DL (ref 3.2–4.8)
ALBUMIN SERPL-MCNC: 3.1 G/DL (ref 3.2–4.8)
ALBUMIN SERPL-MCNC: 3.4 G/DL (ref 3.5–5.2)
ALBUMIN SERPL-MCNC: 3.8 G/DL (ref 3.2–4.8)
ALBUMIN/GLOB SERPL: 1 G/DL
ALBUMIN/GLOB SERPL: 1.5 G/DL (ref 1.5–2.5)
ALP SERPL-CCNC: 54 U/L (ref 25–100)
ALP SERPL-CCNC: 58 U/L (ref 25–100)
ALP SERPL-CCNC: 75 U/L (ref 25–100)
ALP SERPL-CCNC: 77 U/L (ref 39–117)
ALT SERPL W P-5'-P-CCNC: 50 U/L (ref 1–33)
ALT SERPL W P-5'-P-CCNC: 51 U/L (ref 7–40)
ALT SERPL W P-5'-P-CCNC: 62 U/L (ref 7–40)
ALT SERPL W P-5'-P-CCNC: 96 U/L (ref 7–40)
ANION GAP SERPL CALCULATED.3IONS-SCNC: 10 MMOL/L
ANION GAP SERPL CALCULATED.3IONS-SCNC: 13 MMOL/L (ref 3–11)
ANION GAP SERPL CALCULATED.3IONS-SCNC: 5 MMOL/L (ref 3–11)
ANION GAP SERPL CALCULATED.3IONS-SCNC: 6 MMOL/L (ref 3–11)
ANION GAP SERPL CALCULATED.3IONS-SCNC: 8 MMOL/L (ref 3–11)
ARTERIAL PATENCY WRIST A: ABNORMAL
AST SERPL-CCNC: 127 U/L (ref 0–33)
AST SERPL-CCNC: 152 U/L (ref 0–33)
AST SERPL-CCNC: 61 U/L (ref 1–32)
AST SERPL-CCNC: 95 U/L (ref 0–33)
ATMOSPHERIC PRESS: ABNORMAL MMHG
BACTERIA SPEC AEROBE CULT: NORMAL
BACTERIA SPEC AEROBE CULT: NORMAL
BASE EXCESS BLDA CALC-SCNC: -1.1 MMOL/L (ref 0–2)
BASE EXCESS BLDA CALC-SCNC: -3.7 MMOL/L (ref 0–2)
BASE EXCESS BLDA CALC-SCNC: 5.1 MMOL/L (ref 0–2)
BASOPHILS # BLD AUTO: 0.03 10*3/MM3 (ref 0–0.2)
BASOPHILS # BLD AUTO: 0.04 10*3/MM3 (ref 0–0.2)
BASOPHILS # BLD AUTO: 0.04 10*3/MM3 (ref 0–0.2)
BASOPHILS # BLD AUTO: 0.05 10*3/MM3 (ref 0–0.2)
BASOPHILS # BLD AUTO: 0.07 10*3/MM3 (ref 0–0.2)
BASOPHILS NFR BLD AUTO: 0.2 % (ref 0–1)
BASOPHILS NFR BLD AUTO: 0.3 % (ref 0–1)
BASOPHILS NFR BLD AUTO: 0.3 % (ref 0–1.5)
BASOPHILS NFR BLD AUTO: 0.5 % (ref 0–1)
BASOPHILS NFR BLD AUTO: 0.5 % (ref 0–1)
BDY SITE: ABNORMAL
BH CV ECHO MEAS - AO ROOT AREA (BSA CORRECTED): 2
BH CV ECHO MEAS - AO ROOT AREA: 7.5 CM^2
BH CV ECHO MEAS - AO ROOT DIAM: 3.1 CM
BH CV ECHO MEAS - BSA(HAYCOCK): 1.5 M^2
BH CV ECHO MEAS - BSA: 1.5 M^2
BH CV ECHO MEAS - BZI_BMI: 14.2 KILOGRAMS/M^2
BH CV ECHO MEAS - BZI_METRIC_HEIGHT: 177.8 CM
BH CV ECHO MEAS - BZI_METRIC_WEIGHT: 44.9 KG
BH CV ECHO MEAS - EDV(CUBED): 119.8 ML
BH CV ECHO MEAS - EDV(TEICH): 114.4 ML
BH CV ECHO MEAS - EF(CUBED): 26.5 %
BH CV ECHO MEAS - EF(MOD-BP): 26 %
BH CV ECHO MEAS - EF(TEICH): 21.3 %
BH CV ECHO MEAS - ESV(CUBED): 88.1 ML
BH CV ECHO MEAS - ESV(TEICH): 90.1 ML
BH CV ECHO MEAS - FS: 9.7 %
BH CV ECHO MEAS - IVS/LVPW: 0.72
BH CV ECHO MEAS - IVSD: 0.79 CM
BH CV ECHO MEAS - LV MASS(C)D: 165.3 GRAMS
BH CV ECHO MEAS - LV MASS(C)DI: 106.8 GRAMS/M^2
BH CV ECHO MEAS - LVIDD: 4.9 CM
BH CV ECHO MEAS - LVIDS: 4.5 CM
BH CV ECHO MEAS - LVPWD: 1.1 CM
BH CV ECHO MEAS - MV A MAX VEL: 88.2 CM/SEC
BH CV ECHO MEAS - MV DEC TIME: 0.12 SEC
BH CV ECHO MEAS - MV E MAX VEL: 74.7 CM/SEC
BH CV ECHO MEAS - MV E/A: 0.85
BH CV ECHO MEAS - PA ACC SLOPE: 1290 CM/SEC^2
BH CV ECHO MEAS - PA ACC TIME: 0.08 SEC
BH CV ECHO MEAS - PA PR(ACCEL): 44.4 MMHG
BH CV ECHO MEAS - PI END-D VEL: 175 CM/SEC
BH CV ECHO MEAS - RAP SYSTOLE: 12 MMHG
BH CV ECHO MEAS - RVSP: 26 MMHG
BH CV ECHO MEAS - SI(CUBED): 20.5 ML/M^2
BH CV ECHO MEAS - SI(TEICH): 15.7 ML/M^2
BH CV ECHO MEAS - SV(CUBED): 31.7 ML
BH CV ECHO MEAS - SV(TEICH): 24.4 ML
BH CV ECHO MEAS - TR MAX PG: 14 MMHG
BH CV ECHO MEAS - TR MAX VEL: 190 CM/SEC
BH CV VAS BP LEFT ARM: NORMAL MMHG
BH CV XLRA - RV BASE: 3.8 CM
BH CV XLRA - RV LENGTH: 9 CM
BH CV XLRA - RV MID: 3.1 CM
BILIRUB SERPL-MCNC: 0.2 MG/DL (ref 0.2–1.2)
BILIRUB SERPL-MCNC: 0.3 MG/DL (ref 0.3–1.2)
BILIRUB SERPL-MCNC: 0.5 MG/DL (ref 0.3–1.2)
BILIRUB SERPL-MCNC: 0.6 MG/DL (ref 0.3–1.2)
BILIRUB UR QL STRIP: NEGATIVE
BNP SERPL-MCNC: 1875 PG/ML (ref 0–100)
BNP SERPL-MCNC: 1957 PG/ML (ref 0–100)
BNP SERPL-MCNC: 297 PG/ML (ref 0–100)
BODY TEMPERATURE: 37 C
BUN BLD-MCNC: 25 MG/DL (ref 9–23)
BUN BLD-MCNC: 28 MG/DL (ref 9–23)
BUN BLD-MCNC: 32 MG/DL (ref 9–23)
BUN BLD-MCNC: 35 MG/DL (ref 9–23)
BUN BLD-MCNC: 51 MG/DL (ref 8–23)
BUN/CREAT SERPL: 21.7 (ref 7–25)
BUN/CREAT SERPL: 31.1 (ref 7–25)
BUN/CREAT SERPL: 34.4 (ref 7–25)
BUN/CREAT SERPL: 38 (ref 7–25)
BUN/CREAT SERPL: 45.1 (ref 7–25)
CALCIUM SPEC-SCNC: 8.1 MG/DL (ref 8.7–10.4)
CALCIUM SPEC-SCNC: 8.5 MG/DL (ref 8.7–10.4)
CALCIUM SPEC-SCNC: 8.9 MG/DL (ref 8.6–10.5)
CHLORIDE SERPL-SCNC: 105 MMOL/L (ref 99–109)
CHLORIDE SERPL-SCNC: 106 MMOL/L (ref 98–107)
CHLORIDE SERPL-SCNC: 106 MMOL/L (ref 99–109)
CHLORIDE SERPL-SCNC: 107 MMOL/L (ref 99–109)
CHLORIDE SERPL-SCNC: 107 MMOL/L (ref 99–109)
CLARITY UR: CLEAR
CO2 BLDA-SCNC: 25.7 MMOL/L (ref 23–27)
CO2 BLDA-SCNC: 29 MMOL/L (ref 23–27)
CO2 BLDA-SCNC: 31.1 MMOL/L (ref 23–27)
CO2 SERPL-SCNC: 23 MMOL/L (ref 20–31)
CO2 SERPL-SCNC: 24 MMOL/L (ref 20–31)
CO2 SERPL-SCNC: 26 MMOL/L (ref 22–29)
CO2 SERPL-SCNC: 27 MMOL/L (ref 20–31)
CO2 SERPL-SCNC: 28 MMOL/L (ref 20–31)
COHGB MFR BLD: 1.2 % (ref 0–2)
COHGB MFR BLD: 1.3 % (ref 0–2)
COHGB MFR BLD: 1.6 % (ref 0–2)
COLOR UR: YELLOW
CREAT BLD-MCNC: 0.9 MG/DL (ref 0.6–1.3)
CREAT BLD-MCNC: 0.92 MG/DL (ref 0.6–1.3)
CREAT BLD-MCNC: 0.93 MG/DL (ref 0.6–1.3)
CREAT BLD-MCNC: 1.13 MG/DL (ref 0.57–1)
CREAT BLD-MCNC: 1.15 MG/DL (ref 0.6–1.3)
D-LACTATE SERPL-SCNC: 1 MMOL/L (ref 0.5–2)
DEPRECATED RDW RBC AUTO: 46 FL (ref 37–54)
DEPRECATED RDW RBC AUTO: 46.4 FL (ref 37–54)
DEPRECATED RDW RBC AUTO: 47.3 FL (ref 37–54)
DEPRECATED RDW RBC AUTO: 47.3 FL (ref 37–54)
DEPRECATED RDW RBC AUTO: 48.9 FL (ref 37–54)
DEPRECATED RDW RBC AUTO: 49.4 FL (ref 37–54)
DIGOXIN SERPL-MCNC: 1.5 NG/ML (ref 0.8–2)
DIGOXIN SERPL-MCNC: 3.8 NG/ML (ref 0.6–1.2)
EOSINOPHIL # BLD AUTO: 0.01 10*3/MM3 (ref 0–0.3)
EOSINOPHIL # BLD AUTO: 0.01 10*3/MM3 (ref 0–0.4)
EOSINOPHIL # BLD AUTO: 0.04 10*3/MM3 (ref 0–0.3)
EOSINOPHIL # BLD AUTO: 0.16 10*3/MM3 (ref 0–0.3)
EOSINOPHIL # BLD AUTO: 0.22 10*3/MM3 (ref 0–0.3)
EOSINOPHIL NFR BLD AUTO: 0.1 % (ref 0.3–6.2)
EOSINOPHIL NFR BLD AUTO: 0.1 % (ref 0–3)
EOSINOPHIL NFR BLD AUTO: 0.3 % (ref 0–3)
EOSINOPHIL NFR BLD AUTO: 1.5 % (ref 0–3)
EOSINOPHIL NFR BLD AUTO: 1.8 % (ref 0–3)
EPAP: 8
ERYTHROCYTE [DISTWIDTH] IN BLOOD BY AUTOMATED COUNT: 13 % (ref 11.3–14.5)
ERYTHROCYTE [DISTWIDTH] IN BLOOD BY AUTOMATED COUNT: 13.1 % (ref 11.3–14.5)
ERYTHROCYTE [DISTWIDTH] IN BLOOD BY AUTOMATED COUNT: 13.2 % (ref 11.3–14.5)
ERYTHROCYTE [DISTWIDTH] IN BLOOD BY AUTOMATED COUNT: 13.4 % (ref 11.3–14.5)
ERYTHROCYTE [DISTWIDTH] IN BLOOD BY AUTOMATED COUNT: 13.6 % (ref 12.3–15.4)
ERYTHROCYTE [DISTWIDTH] IN BLOOD BY AUTOMATED COUNT: 13.7 % (ref 11.3–14.5)
GFR SERPL CREATININE-BSD FRML MDRD: 45 ML/MIN/1.73
GFR SERPL CREATININE-BSD FRML MDRD: 46 ML/MIN/1.73
GFR SERPL CREATININE-BSD FRML MDRD: 58 ML/MIN/1.73
GFR SERPL CREATININE-BSD FRML MDRD: 58 ML/MIN/1.73
GFR SERPL CREATININE-BSD FRML MDRD: 60 ML/MIN/1.73
GLOBULIN UR ELPH-MCNC: 2.1 GM/DL
GLOBULIN UR ELPH-MCNC: 2.1 GM/DL
GLOBULIN UR ELPH-MCNC: 2.5 GM/DL
GLOBULIN UR ELPH-MCNC: 3.3 GM/DL
GLUCOSE BLD-MCNC: 114 MG/DL (ref 70–100)
GLUCOSE BLD-MCNC: 134 MG/DL (ref 70–100)
GLUCOSE BLD-MCNC: 150 MG/DL (ref 65–99)
GLUCOSE BLD-MCNC: 179 MG/DL (ref 70–100)
GLUCOSE BLD-MCNC: 317 MG/DL (ref 70–100)
GLUCOSE BLDC GLUCOMTR-MCNC: 118 MG/DL (ref 70–130)
GLUCOSE BLDC GLUCOMTR-MCNC: 120 MG/DL (ref 70–130)
GLUCOSE BLDC GLUCOMTR-MCNC: 126 MG/DL (ref 70–130)
GLUCOSE BLDC GLUCOMTR-MCNC: 128 MG/DL (ref 70–130)
GLUCOSE BLDC GLUCOMTR-MCNC: 129 MG/DL (ref 70–130)
GLUCOSE BLDC GLUCOMTR-MCNC: 134 MG/DL (ref 70–130)
GLUCOSE BLDC GLUCOMTR-MCNC: 142 MG/DL (ref 70–130)
GLUCOSE BLDC GLUCOMTR-MCNC: 142 MG/DL (ref 70–130)
GLUCOSE BLDC GLUCOMTR-MCNC: 143 MG/DL (ref 70–130)
GLUCOSE BLDC GLUCOMTR-MCNC: 144 MG/DL (ref 70–130)
GLUCOSE BLDC GLUCOMTR-MCNC: 151 MG/DL (ref 70–130)
GLUCOSE BLDC GLUCOMTR-MCNC: 154 MG/DL (ref 70–130)
GLUCOSE BLDC GLUCOMTR-MCNC: 160 MG/DL (ref 70–130)
GLUCOSE BLDC GLUCOMTR-MCNC: 163 MG/DL (ref 70–130)
GLUCOSE BLDC GLUCOMTR-MCNC: 164 MG/DL (ref 70–130)
GLUCOSE BLDC GLUCOMTR-MCNC: 167 MG/DL (ref 70–130)
GLUCOSE BLDC GLUCOMTR-MCNC: 168 MG/DL (ref 70–130)
GLUCOSE BLDC GLUCOMTR-MCNC: 174 MG/DL (ref 70–130)
GLUCOSE BLDC GLUCOMTR-MCNC: 176 MG/DL (ref 70–130)
GLUCOSE BLDC GLUCOMTR-MCNC: 180 MG/DL (ref 70–130)
GLUCOSE BLDC GLUCOMTR-MCNC: 180 MG/DL (ref 70–130)
GLUCOSE BLDC GLUCOMTR-MCNC: 183 MG/DL (ref 70–130)
GLUCOSE BLDC GLUCOMTR-MCNC: 185 MG/DL (ref 70–130)
GLUCOSE BLDC GLUCOMTR-MCNC: 186 MG/DL (ref 70–130)
GLUCOSE BLDC GLUCOMTR-MCNC: 187 MG/DL (ref 70–130)
GLUCOSE BLDC GLUCOMTR-MCNC: 189 MG/DL (ref 70–130)
GLUCOSE BLDC GLUCOMTR-MCNC: 195 MG/DL (ref 70–130)
GLUCOSE BLDC GLUCOMTR-MCNC: 197 MG/DL (ref 70–130)
GLUCOSE BLDC GLUCOMTR-MCNC: 206 MG/DL (ref 70–130)
GLUCOSE BLDC GLUCOMTR-MCNC: 253 MG/DL (ref 70–130)
GLUCOSE UR STRIP-MCNC: NEGATIVE MG/DL
HBA1C MFR BLD: 6.4 % (ref 4.8–5.6)
HCO3 BLDA-SCNC: 24 MMOL/L (ref 20–26)
HCO3 BLDA-SCNC: 27.1 MMOL/L (ref 20–26)
HCO3 BLDA-SCNC: 29.7 MMOL/L (ref 20–26)
HCT VFR BLD AUTO: 30.2 % (ref 34.5–44)
HCT VFR BLD AUTO: 30.5 % (ref 34.5–44)
HCT VFR BLD AUTO: 33 % (ref 34.5–44)
HCT VFR BLD AUTO: 36.6 % (ref 34–46.6)
HCT VFR BLD AUTO: 40.8 % (ref 34.5–44)
HCT VFR BLD AUTO: 41.4 % (ref 34.5–44)
HCT VFR BLD CALC: 30.1 %
HCT VFR BLD CALC: 38.6 %
HCT VFR BLD CALC: 39 %
HGB BLD-MCNC: 10.4 G/DL (ref 11.5–15.5)
HGB BLD-MCNC: 10.9 G/DL (ref 12–15.9)
HGB BLD-MCNC: 12.4 G/DL (ref 11.5–15.5)
HGB BLD-MCNC: 12.6 G/DL (ref 11.5–15.5)
HGB BLD-MCNC: 9.4 G/DL (ref 11.5–15.5)
HGB BLD-MCNC: 9.9 G/DL (ref 11.5–15.5)
HGB BLDA-MCNC: 12.6 G/DL (ref 14–18)
HGB BLDA-MCNC: 12.7 G/DL (ref 14–18)
HGB BLDA-MCNC: 9.8 G/DL (ref 14–18)
HGB UR QL STRIP.AUTO: NEGATIVE
HOLD SPECIMEN: NORMAL
HOROWITZ INDEX BLD+IHG-RTO: 35 %
HOROWITZ INDEX BLD+IHG-RTO: 55 %
HOROWITZ INDEX BLD+IHG-RTO: 60 %
IMM GRANULOCYTES # BLD AUTO: 0.02 10*3/MM3 (ref 0–0.05)
IMM GRANULOCYTES # BLD AUTO: 0.04 10*3/MM3 (ref 0–0.05)
IMM GRANULOCYTES # BLD AUTO: 0.04 10*3/MM3 (ref 0–0.05)
IMM GRANULOCYTES # BLD AUTO: 0.08 10*3/MM3 (ref 0–0.05)
IMM GRANULOCYTES # BLD AUTO: 0.08 10*3/MM3 (ref 0–0.05)
IMM GRANULOCYTES NFR BLD AUTO: 0.2 % (ref 0–0.6)
IMM GRANULOCYTES NFR BLD AUTO: 0.3 % (ref 0–0.6)
IMM GRANULOCYTES NFR BLD AUTO: 0.3 % (ref 0–0.6)
IMM GRANULOCYTES NFR BLD AUTO: 0.5 % (ref 0–0.5)
IMM GRANULOCYTES NFR BLD AUTO: 0.5 % (ref 0–0.6)
INR PPP: 1.29 (ref 0.85–1.16)
INR PPP: 1.41 (ref 0.85–1.16)
INR PPP: 2.04 (ref 0.85–1.16)
INR PPP: 2.7 (ref 0.8–1.2)
IPAP: 16
KETONES UR QL STRIP: ABNORMAL
LEUKOCYTE ESTERASE UR QL STRIP.AUTO: NEGATIVE
LYMPHOCYTES # BLD AUTO: 1.12 10*3/MM3 (ref 0.7–3.1)
LYMPHOCYTES # BLD AUTO: 1.49 10*3/MM3 (ref 0.6–4.8)
LYMPHOCYTES # BLD AUTO: 1.63 10*3/MM3 (ref 0.6–4.8)
LYMPHOCYTES # BLD AUTO: 1.94 10*3/MM3 (ref 0.6–4.8)
LYMPHOCYTES # BLD AUTO: 5.62 10*3/MM3 (ref 0.6–4.8)
LYMPHOCYTES NFR BLD AUTO: 11.2 % (ref 24–44)
LYMPHOCYTES NFR BLD AUTO: 15.6 % (ref 24–44)
LYMPHOCYTES NFR BLD AUTO: 18.5 % (ref 24–44)
LYMPHOCYTES NFR BLD AUTO: 38 % (ref 24–44)
LYMPHOCYTES NFR BLD AUTO: 6.7 % (ref 19.6–45.3)
MAGNESIUM SERPL-MCNC: 1.7 MG/DL (ref 1.3–2.7)
MAGNESIUM SERPL-MCNC: 1.9 MG/DL (ref 1.3–2.7)
MAGNESIUM SERPL-MCNC: 2.1 MG/DL (ref 1.3–2.7)
MCH RBC QN AUTO: 29.6 PG (ref 26.6–33)
MCH RBC QN AUTO: 30.1 PG (ref 27–31)
MCH RBC QN AUTO: 30.3 PG (ref 27–31)
MCH RBC QN AUTO: 30.3 PG (ref 27–31)
MCH RBC QN AUTO: 30.4 PG (ref 27–31)
MCH RBC QN AUTO: 30.7 PG (ref 27–31)
MCHC RBC AUTO-ENTMCNC: 29.8 G/DL (ref 31.5–35.7)
MCHC RBC AUTO-ENTMCNC: 30.4 G/DL (ref 32–36)
MCHC RBC AUTO-ENTMCNC: 30.4 G/DL (ref 32–36)
MCHC RBC AUTO-ENTMCNC: 31.1 G/DL (ref 32–36)
MCHC RBC AUTO-ENTMCNC: 31.5 G/DL (ref 32–36)
MCHC RBC AUTO-ENTMCNC: 32.5 G/DL (ref 32–36)
MCV RBC AUTO: 94.7 FL (ref 80–99)
MCV RBC AUTO: 96.5 FL (ref 80–99)
MCV RBC AUTO: 97.4 FL (ref 80–99)
MCV RBC AUTO: 99 FL (ref 80–99)
MCV RBC AUTO: 99.5 FL (ref 79–97)
MCV RBC AUTO: 99.5 FL (ref 80–99)
METHGB BLD QL: 0.7 % (ref 0–1.5)
METHGB BLD QL: 0.8 % (ref 0–1.5)
METHGB BLD QL: 0.9 % (ref 0–1.5)
MODALITY: ABNORMAL
MONOCYTES # BLD AUTO: 0.9 10*3/MM3 (ref 0–1)
MONOCYTES # BLD AUTO: 0.9 10*3/MM3 (ref 0–1)
MONOCYTES # BLD AUTO: 0.91 10*3/MM3 (ref 0.1–0.9)
MONOCYTES # BLD AUTO: 1.05 10*3/MM3 (ref 0–1)
MONOCYTES # BLD AUTO: 1.32 10*3/MM3 (ref 0–1)
MONOCYTES NFR BLD AUTO: 10.2 % (ref 0–12)
MONOCYTES NFR BLD AUTO: 10.6 % (ref 0–12)
MONOCYTES NFR BLD AUTO: 5.4 % (ref 5–12)
MONOCYTES NFR BLD AUTO: 6.1 % (ref 0–12)
MONOCYTES NFR BLD AUTO: 7.9 % (ref 0–12)
MRSA DNA SPEC QL NAA+PROBE: NEGATIVE
NEUTROPHILS # BLD AUTO: 10.68 10*3/MM3 (ref 1.5–8.3)
NEUTROPHILS # BLD AUTO: 14.69 10*3/MM3 (ref 1.4–7)
NEUTROPHILS # BLD AUTO: 6.08 10*3/MM3 (ref 1.5–8.3)
NEUTROPHILS # BLD AUTO: 7.98 10*3/MM3 (ref 1.5–8.3)
NEUTROPHILS # BLD AUTO: 9.08 10*3/MM3 (ref 1.5–8.3)
NEUTROPHILS NFR BLD AUTO: 53.9 % (ref 41–71)
NEUTROPHILS NFR BLD AUTO: 69 % (ref 41–71)
NEUTROPHILS NFR BLD AUTO: 72.9 % (ref 41–71)
NEUTROPHILS NFR BLD AUTO: 80.6 % (ref 41–71)
NEUTROPHILS NFR BLD AUTO: 87.5 % (ref 42.7–76)
NITRITE UR QL STRIP: NEGATIVE
NOTE: ABNORMAL
NT-PROBNP SERPL-MCNC: ABNORMAL PG/ML (ref 5–1800)
OXYHGB MFR BLDV: 87.5 % (ref 94–99)
OXYHGB MFR BLDV: 94.5 % (ref 94–99)
OXYHGB MFR BLDV: 94.6 % (ref 94–99)
PCO2 BLDA: 43.2 MM HG (ref 35–45)
PCO2 BLDA: 54.2 MM HG (ref 35–45)
PCO2 BLDA: 60.6 MM HG (ref 35–45)
PCO2 TEMP ADJ BLD: 43.2 MM HG (ref 35–45)
PCO2 TEMP ADJ BLD: 54.2 MM HG (ref 35–45)
PCO2 TEMP ADJ BLD: 60.6 MM HG (ref 35–45)
PH BLDA: 7.25 PH UNITS (ref 7.35–7.45)
PH BLDA: 7.26 PH UNITS (ref 7.35–7.45)
PH BLDA: 7.45 PH UNITS (ref 7.35–7.45)
PH UR STRIP.AUTO: <=5 [PH] (ref 5–8)
PH, TEMP CORRECTED: 7.25 PH UNITS
PH, TEMP CORRECTED: 7.26 PH UNITS
PH, TEMP CORRECTED: 7.45 PH UNITS
PHOSPHATE SERPL-MCNC: 2.6 MG/DL (ref 2.4–5.1)
PHOSPHATE SERPL-MCNC: 3.5 MG/DL (ref 2.4–5.1)
PHOSPHATE SERPL-MCNC: 3.6 MG/DL (ref 2.4–5.1)
PHOSPHATE SERPL-MCNC: 4.8 MG/DL (ref 2.4–5.1)
PLATELET # BLD AUTO: 238 10*3/MM3 (ref 150–450)
PLATELET # BLD AUTO: 254 10*3/MM3 (ref 150–450)
PLATELET # BLD AUTO: 283 10*3/MM3 (ref 150–450)
PLATELET # BLD AUTO: 295 10*3/MM3 (ref 150–450)
PLATELET # BLD AUTO: 351 10*3/MM3 (ref 150–450)
PLATELET # BLD AUTO: 602 10*3/MM3 (ref 140–450)
PMV BLD AUTO: 10.6 FL (ref 6–12)
PMV BLD AUTO: 10.6 FL (ref 6–12)
PMV BLD AUTO: 9.4 FL (ref 6–12)
PMV BLD AUTO: 9.6 FL (ref 6–12)
PMV BLD AUTO: 9.7 FL (ref 6–12)
PMV BLD AUTO: 9.9 FL (ref 6–12)
PO2 BLDA: 63.6 MM HG (ref 83–108)
PO2 BLDA: 76.8 MM HG (ref 83–108)
PO2 BLDA: 93.6 MM HG (ref 83–108)
PO2 TEMP ADJ BLD: 63.6 MM HG (ref 83–108)
PO2 TEMP ADJ BLD: 76.8 MM HG (ref 83–108)
PO2 TEMP ADJ BLD: 93.6 MM HG (ref 83–108)
POTASSIUM BLD-SCNC: 3.8 MMOL/L (ref 3.5–5.5)
POTASSIUM BLD-SCNC: 3.9 MMOL/L (ref 3.5–5.5)
POTASSIUM BLD-SCNC: 4 MMOL/L (ref 3.5–5.5)
POTASSIUM BLD-SCNC: 4.6 MMOL/L (ref 3.5–5.5)
POTASSIUM BLD-SCNC: 5.6 MMOL/L (ref 3.5–5.2)
PROCALCITONIN SERPL-MCNC: 0.34 NG/ML
PROCALCITONIN SERPL-MCNC: <0.05 NG/ML
PROT SERPL-MCNC: 5.1 G/DL (ref 5.7–8.2)
PROT SERPL-MCNC: 5.2 G/DL (ref 5.7–8.2)
PROT SERPL-MCNC: 6.3 G/DL (ref 5.7–8.2)
PROT SERPL-MCNC: 6.7 G/DL (ref 6–8.5)
PROT UR QL STRIP: NEGATIVE
PROTHROMBIN TIME: 15.5 SECONDS (ref 11.2–14.3)
PROTHROMBIN TIME: 16.6 SECONDS (ref 11.2–14.3)
PROTHROMBIN TIME: 22.2 SECONDS (ref 11.2–14.3)
PROTHROMBIN TIME: 30.7 SECONDS (ref 12.8–15.2)
RBC # BLD AUTO: 3.1 10*6/MM3 (ref 3.89–5.14)
RBC # BLD AUTO: 3.22 10*6/MM3 (ref 3.89–5.14)
RBC # BLD AUTO: 3.42 10*6/MM3 (ref 3.89–5.14)
RBC # BLD AUTO: 3.68 10*6/MM3 (ref 3.77–5.28)
RBC # BLD AUTO: 4.12 10*6/MM3 (ref 3.89–5.14)
RBC # BLD AUTO: 4.16 10*6/MM3 (ref 3.89–5.14)
SODIUM BLD-SCNC: 138 MMOL/L (ref 132–146)
SODIUM BLD-SCNC: 139 MMOL/L (ref 132–146)
SODIUM BLD-SCNC: 141 MMOL/L (ref 132–146)
SODIUM BLD-SCNC: 141 MMOL/L (ref 132–146)
SODIUM BLD-SCNC: 142 MMOL/L (ref 136–145)
SP GR UR STRIP: >=1.03 (ref 1–1.03)
T4 FREE SERPL-MCNC: 0.93 NG/DL (ref 0.89–1.76)
TROPONIN I SERPL-MCNC: 0 NG/ML (ref 0–0.07)
TROPONIN I SERPL-MCNC: 3.57 NG/ML (ref 0–0.07)
TROPONIN I SERPL-MCNC: 4.53 NG/ML (ref 0–0.07)
TSH SERPL DL<=0.05 MIU/L-ACNC: 2.67 MIU/ML (ref 0.35–5.35)
UROBILINOGEN UR QL STRIP: ABNORMAL
VENTILATOR MODE: ABNORMAL
VENTILATOR MODE: ABNORMAL
WBC NRBC COR # BLD: 12.46 10*3/MM3 (ref 3.5–10.8)
WBC NRBC COR # BLD: 13.26 10*3/MM3 (ref 3.5–10.8)
WBC NRBC COR # BLD: 14.79 10*3/MM3 (ref 3.5–10.8)
WBC NRBC COR # BLD: 16.78 10*3/MM3 (ref 3.4–10.8)
WBC NRBC COR # BLD: 17.83 10*3/MM3 (ref 3.5–10.8)
WBC NRBC COR # BLD: 8.81 10*3/MM3 (ref 3.5–10.8)
WHOLE BLOOD HOLD SPECIMEN: NORMAL

## 2019-01-01 PROCEDURE — 97530 THERAPEUTIC ACTIVITIES: CPT

## 2019-01-01 PROCEDURE — 80053 COMPREHEN METABOLIC PANEL: CPT | Performed by: INTERNAL MEDICINE

## 2019-01-01 PROCEDURE — 85025 COMPLETE CBC W/AUTO DIFF WBC: CPT | Performed by: EMERGENCY MEDICINE

## 2019-01-01 PROCEDURE — 25010000002 ENOXAPARIN PER 10 MG: Performed by: INTERNAL MEDICINE

## 2019-01-01 PROCEDURE — 99232 SBSQ HOSP IP/OBS MODERATE 35: CPT | Performed by: INTERNAL MEDICINE

## 2019-01-01 PROCEDURE — 25010000002 HYDROMORPHONE HCL PF 500 MG/50ML SOLUTION: Performed by: NURSE PRACTITIONER

## 2019-01-01 PROCEDURE — 25010000002 HYDROMORPHONE PER 4 MG: Performed by: NURSE PRACTITIONER

## 2019-01-01 PROCEDURE — 87040 BLOOD CULTURE FOR BACTERIA: CPT | Performed by: EMERGENCY MEDICINE

## 2019-01-01 PROCEDURE — 25010000002 FENTANYL CITRATE (PF) 100 MCG/2ML SOLUTION: Performed by: NURSE PRACTITIONER

## 2019-01-01 PROCEDURE — 71250 CT THORAX DX C-: CPT

## 2019-01-01 PROCEDURE — 83605 ASSAY OF LACTIC ACID: CPT | Performed by: EMERGENCY MEDICINE

## 2019-01-01 PROCEDURE — 84484 ASSAY OF TROPONIN QUANT: CPT

## 2019-01-01 PROCEDURE — 82962 GLUCOSE BLOOD TEST: CPT

## 2019-01-01 PROCEDURE — 94799 UNLISTED PULMONARY SVC/PX: CPT

## 2019-01-01 PROCEDURE — 71045 X-RAY EXAM CHEST 1 VIEW: CPT

## 2019-01-01 PROCEDURE — 63710000001 INSULIN LISPRO (HUMAN) PER 5 UNITS: Performed by: INTERNAL MEDICINE

## 2019-01-01 PROCEDURE — 80053 COMPREHEN METABOLIC PANEL: CPT | Performed by: EMERGENCY MEDICINE

## 2019-01-01 PROCEDURE — 99291 CRITICAL CARE FIRST HOUR: CPT | Performed by: INTERNAL MEDICINE

## 2019-01-01 PROCEDURE — 25010000002 FUROSEMIDE PER 20 MG: Performed by: NURSE PRACTITIONER

## 2019-01-01 PROCEDURE — 93005 ELECTROCARDIOGRAM TRACING: CPT | Performed by: EMERGENCY MEDICINE

## 2019-01-01 PROCEDURE — 80069 RENAL FUNCTION PANEL: CPT | Performed by: INTERNAL MEDICINE

## 2019-01-01 PROCEDURE — 85610 PROTHROMBIN TIME: CPT | Performed by: INTERNAL MEDICINE

## 2019-01-01 PROCEDURE — 99285 EMERGENCY DEPT VISIT HI MDM: CPT

## 2019-01-01 PROCEDURE — 25010000002 HALOPERIDOL LACTATE PER 5 MG: Performed by: NURSE PRACTITIONER

## 2019-01-01 PROCEDURE — 0 IOPAMIDOL PER 1 ML: Performed by: EMERGENCY MEDICINE

## 2019-01-01 PROCEDURE — 93005 ELECTROCARDIOGRAM TRACING: CPT

## 2019-01-01 PROCEDURE — 83036 HEMOGLOBIN GLYCOSYLATED A1C: CPT | Performed by: NURSE PRACTITIONER

## 2019-01-01 PROCEDURE — 36600 WITHDRAWAL OF ARTERIAL BLOOD: CPT

## 2019-01-01 PROCEDURE — 25010000002 FUROSEMIDE PER 20 MG: Performed by: EMERGENCY MEDICINE

## 2019-01-01 PROCEDURE — 85025 COMPLETE CBC W/AUTO DIFF WBC: CPT | Performed by: INTERNAL MEDICINE

## 2019-01-01 PROCEDURE — 25010000002 VITAMIN K1 PER 1 MG: Performed by: EMERGENCY MEDICINE

## 2019-01-01 PROCEDURE — 85610 PROTHROMBIN TIME: CPT | Performed by: NURSE PRACTITIONER

## 2019-01-01 PROCEDURE — 25010000002 KETOROLAC TROMETHAMINE PER 15 MG: Performed by: NURSE PRACTITIONER

## 2019-01-01 PROCEDURE — 82805 BLOOD GASES W/O2 SATURATION: CPT

## 2019-01-01 PROCEDURE — 94640 AIRWAY INHALATION TREATMENT: CPT

## 2019-01-01 PROCEDURE — 84145 PROCALCITONIN (PCT): CPT | Performed by: INTERNAL MEDICINE

## 2019-01-01 PROCEDURE — 84443 ASSAY THYROID STIM HORMONE: CPT | Performed by: EMERGENCY MEDICINE

## 2019-01-01 PROCEDURE — 25010000002 MAGNESIUM SULFATE 2 GM/50ML SOLUTION: Performed by: INTERNAL MEDICINE

## 2019-01-01 PROCEDURE — 99223 1ST HOSP IP/OBS HIGH 75: CPT | Performed by: INTERNAL MEDICINE

## 2019-01-01 PROCEDURE — 99284 EMERGENCY DEPT VISIT MOD MDM: CPT

## 2019-01-01 PROCEDURE — 99291 CRITICAL CARE FIRST HOUR: CPT

## 2019-01-01 PROCEDURE — 83880 ASSAY OF NATRIURETIC PEPTIDE: CPT | Performed by: EMERGENCY MEDICINE

## 2019-01-01 PROCEDURE — 71275 CT ANGIOGRAPHY CHEST: CPT

## 2019-01-01 PROCEDURE — 70450 CT HEAD/BRAIN W/O DYE: CPT

## 2019-01-01 PROCEDURE — 83735 ASSAY OF MAGNESIUM: CPT | Performed by: NURSE PRACTITIONER

## 2019-01-01 PROCEDURE — 93306 TTE W/DOPPLER COMPLETE: CPT

## 2019-01-01 PROCEDURE — 85610 PROTHROMBIN TIME: CPT

## 2019-01-01 PROCEDURE — 84100 ASSAY OF PHOSPHORUS: CPT | Performed by: NURSE PRACTITIONER

## 2019-01-01 PROCEDURE — 92526 ORAL FUNCTION THERAPY: CPT

## 2019-01-01 PROCEDURE — 85027 COMPLETE CBC AUTOMATED: CPT | Performed by: NURSE PRACTITIONER

## 2019-01-01 PROCEDURE — 94660 CPAP INITIATION&MGMT: CPT

## 2019-01-01 PROCEDURE — 25010000002 HYDROMORPHONE PER 4 MG: Performed by: EMERGENCY MEDICINE

## 2019-01-01 PROCEDURE — 25010000002 VANCOMYCIN PER 500 MG: Performed by: EMERGENCY MEDICINE

## 2019-01-01 PROCEDURE — 84100 ASSAY OF PHOSPHORUS: CPT | Performed by: INTERNAL MEDICINE

## 2019-01-01 PROCEDURE — 99239 HOSP IP/OBS DSCHRG MGMT >30: CPT | Performed by: INTERNAL MEDICINE

## 2019-01-01 PROCEDURE — 99233 SBSQ HOSP IP/OBS HIGH 50: CPT | Performed by: INTERNAL MEDICINE

## 2019-01-01 PROCEDURE — 83735 ASSAY OF MAGNESIUM: CPT | Performed by: INTERNAL MEDICINE

## 2019-01-01 PROCEDURE — 97535 SELF CARE MNGMENT TRAINING: CPT

## 2019-01-01 PROCEDURE — 80162 ASSAY OF DIGOXIN TOTAL: CPT | Performed by: EMERGENCY MEDICINE

## 2019-01-01 PROCEDURE — 92610 EVALUATE SWALLOWING FUNCTION: CPT

## 2019-01-01 PROCEDURE — 97110 THERAPEUTIC EXERCISES: CPT

## 2019-01-01 PROCEDURE — 83880 ASSAY OF NATRIURETIC PEPTIDE: CPT | Performed by: INTERNAL MEDICINE

## 2019-01-01 PROCEDURE — 84439 ASSAY OF FREE THYROXINE: CPT | Performed by: EMERGENCY MEDICINE

## 2019-01-01 PROCEDURE — 63710000001 INSULIN LISPRO (HUMAN) PER 5 UNITS: Performed by: NURSE PRACTITIONER

## 2019-01-01 PROCEDURE — 97163 PT EVAL HIGH COMPLEX 45 MIN: CPT

## 2019-01-01 PROCEDURE — 99233 SBSQ HOSP IP/OBS HIGH 50: CPT | Performed by: NURSE PRACTITIONER

## 2019-01-01 PROCEDURE — 99238 HOSP IP/OBS DSCHRG MGMT 30/<: CPT | Performed by: INTERNAL MEDICINE

## 2019-01-01 PROCEDURE — 73130 X-RAY EXAM OF HAND: CPT

## 2019-01-01 PROCEDURE — 80162 ASSAY OF DIGOXIN TOTAL: CPT | Performed by: NURSE PRACTITIONER

## 2019-01-01 PROCEDURE — 84145 PROCALCITONIN (PCT): CPT | Performed by: EMERGENCY MEDICINE

## 2019-01-01 PROCEDURE — 72125 CT NECK SPINE W/O DYE: CPT

## 2019-01-01 PROCEDURE — 87641 MR-STAPH DNA AMP PROBE: CPT | Performed by: INTERNAL MEDICINE

## 2019-01-01 PROCEDURE — 85610 PROTHROMBIN TIME: CPT | Performed by: PHYSICIAN ASSISTANT

## 2019-01-01 PROCEDURE — 97165 OT EVAL LOW COMPLEX 30 MIN: CPT

## 2019-01-01 PROCEDURE — 81003 URINALYSIS AUTO W/O SCOPE: CPT | Performed by: EMERGENCY MEDICINE

## 2019-01-01 RX ORDER — KETOROLAC TROMETHAMINE 30 MG/ML
15 INJECTION, SOLUTION INTRAMUSCULAR; INTRAVENOUS EVERY 8 HOURS PRN
Status: CANCELLED | OUTPATIENT
Start: 2019-01-01 | End: 2019-04-18

## 2019-01-01 RX ORDER — IPRATROPIUM BROMIDE AND ALBUTEROL SULFATE 2.5; .5 MG/3ML; MG/3ML
3 SOLUTION RESPIRATORY (INHALATION) EVERY 4 HOURS PRN
Status: CANCELLED | OUTPATIENT
Start: 2019-01-01

## 2019-01-01 RX ORDER — SODIUM CHLORIDE 0.9 % (FLUSH) 0.9 %
3 SYRINGE (ML) INJECTION EVERY 12 HOURS SCHEDULED
Status: DISCONTINUED | OUTPATIENT
Start: 2019-01-01 | End: 2019-01-01 | Stop reason: HOSPADM

## 2019-01-01 RX ORDER — ACETAMINOPHEN 325 MG/1
650 TABLET ORAL 3 TIMES DAILY
Status: DISCONTINUED | OUTPATIENT
Start: 2019-01-01 | End: 2019-01-01 | Stop reason: HOSPADM

## 2019-01-01 RX ORDER — HYDROMORPHONE HYDROCHLORIDE 1 MG/ML
0.25 INJECTION, SOLUTION INTRAMUSCULAR; INTRAVENOUS; SUBCUTANEOUS
Status: DISCONTINUED | OUTPATIENT
Start: 2019-01-01 | End: 2019-01-01 | Stop reason: HOSPADM

## 2019-01-01 RX ORDER — SENNA AND DOCUSATE SODIUM 50; 8.6 MG/1; MG/1
2 TABLET, FILM COATED ORAL NIGHTLY
Status: CANCELLED | OUTPATIENT
Start: 2019-01-01

## 2019-01-01 RX ORDER — CLINDAMYCIN PHOSPHATE 600 MG/50ML
600 INJECTION INTRAVENOUS EVERY 8 HOURS
Status: DISCONTINUED | OUTPATIENT
Start: 2019-01-01 | End: 2019-01-01

## 2019-01-01 RX ORDER — VANCOMYCIN HYDROCHLORIDE 1 G/200ML
20 INJECTION, SOLUTION INTRAVENOUS ONCE
Status: COMPLETED | OUTPATIENT
Start: 2019-01-01 | End: 2019-01-01

## 2019-01-01 RX ORDER — ACETAMINOPHEN 650 MG/1
650 SUPPOSITORY RECTAL EVERY 4 HOURS PRN
Status: DISCONTINUED | OUTPATIENT
Start: 2019-01-01 | End: 2019-01-01 | Stop reason: HOSPADM

## 2019-01-01 RX ORDER — IPRATROPIUM BROMIDE AND ALBUTEROL SULFATE 2.5; .5 MG/3ML; MG/3ML
3 SOLUTION RESPIRATORY (INHALATION) EVERY 4 HOURS PRN
Status: DISCONTINUED | OUTPATIENT
Start: 2019-01-01 | End: 2019-01-01

## 2019-01-01 RX ORDER — CASTOR OIL AND BALSAM, PERU 788; 87 MG/G; MG/G
OINTMENT TOPICAL EVERY 12 HOURS SCHEDULED
Status: DISCONTINUED | OUTPATIENT
Start: 2019-01-01 | End: 2019-01-01 | Stop reason: HOSPADM

## 2019-01-01 RX ORDER — FAMOTIDINE 20 MG/1
20 TABLET, FILM COATED ORAL 2 TIMES DAILY
Status: DISCONTINUED | OUTPATIENT
Start: 2019-01-01 | End: 2019-01-01

## 2019-01-01 RX ORDER — SENNA AND DOCUSATE SODIUM 50; 8.6 MG/1; MG/1
2 TABLET, FILM COATED ORAL NIGHTLY
Status: DISCONTINUED | OUTPATIENT
Start: 2019-01-01 | End: 2019-01-01 | Stop reason: HOSPADM

## 2019-01-01 RX ORDER — KETOROLAC TROMETHAMINE 30 MG/ML
15 INJECTION, SOLUTION INTRAMUSCULAR; INTRAVENOUS EVERY 8 HOURS PRN
Status: DISCONTINUED | OUTPATIENT
Start: 2019-01-01 | End: 2019-01-01 | Stop reason: HOSPADM

## 2019-01-01 RX ORDER — FUROSEMIDE 10 MG/ML
40 INJECTION INTRAMUSCULAR; INTRAVENOUS ONCE
Status: COMPLETED | OUTPATIENT
Start: 2019-01-01 | End: 2019-01-01

## 2019-01-01 RX ORDER — DIGOXIN 250 MCG
250 TABLET ORAL DAILY
Status: DISCONTINUED | OUTPATIENT
Start: 2019-01-01 | End: 2019-01-01

## 2019-01-01 RX ORDER — HYDROCODONE BITARTRATE AND ACETAMINOPHEN 5; 325 MG/1; MG/1
1 TABLET ORAL EVERY 6 HOURS PRN
Start: 2019-01-01 | End: 2019-04-18

## 2019-01-01 RX ORDER — MAGNESIUM SULFATE 1 G/100ML
1 INJECTION INTRAVENOUS AS NEEDED
Status: DISCONTINUED | OUTPATIENT
Start: 2019-01-01 | End: 2019-01-01 | Stop reason: HOSPADM

## 2019-01-01 RX ORDER — HALOPERIDOL 5 MG/ML
1 INJECTION INTRAMUSCULAR EVERY 4 HOURS PRN
Status: DISCONTINUED | OUTPATIENT
Start: 2019-01-01 | End: 2019-01-01 | Stop reason: HOSPADM

## 2019-01-01 RX ORDER — TRAZODONE HYDROCHLORIDE 50 MG/1
50 TABLET ORAL NIGHTLY PRN
Status: DISCONTINUED | OUTPATIENT
Start: 2019-01-01 | End: 2019-01-01 | Stop reason: HOSPADM

## 2019-01-01 RX ORDER — SODIUM CHLORIDE 0.9 % (FLUSH) 0.9 %
10 SYRINGE (ML) INJECTION AS NEEDED
Status: CANCELLED | OUTPATIENT
Start: 2019-01-01

## 2019-01-01 RX ORDER — HYDROCODONE BITARTRATE AND ACETAMINOPHEN 5; 325 MG/1; MG/1
1 TABLET ORAL EVERY 6 HOURS PRN
Status: DISCONTINUED | OUTPATIENT
Start: 2019-01-01 | End: 2019-01-01 | Stop reason: HOSPADM

## 2019-01-01 RX ORDER — ACETAMINOPHEN 325 MG/1
650 TABLET ORAL EVERY 4 HOURS PRN
Start: 2019-01-01

## 2019-01-01 RX ORDER — ACETAMINOPHEN 325 MG/1
650 TABLET ORAL 3 TIMES DAILY
Status: DISCONTINUED | OUTPATIENT
Start: 2019-01-01 | End: 2019-01-01

## 2019-01-01 RX ORDER — ONDANSETRON 2 MG/ML
4 INJECTION INTRAMUSCULAR; INTRAVENOUS EVERY 6 HOURS PRN
Status: DISCONTINUED | OUTPATIENT
Start: 2019-01-01 | End: 2019-01-01 | Stop reason: HOSPADM

## 2019-01-01 RX ORDER — ATORVASTATIN CALCIUM 40 MG/1
40 TABLET, FILM COATED ORAL NIGHTLY
Status: DISCONTINUED | OUTPATIENT
Start: 2019-01-01 | End: 2019-01-01

## 2019-01-01 RX ORDER — DIGOXIN 0.25 MG/ML
250 INJECTION INTRAMUSCULAR; INTRAVENOUS ONCE
Status: DISCONTINUED | OUTPATIENT
Start: 2019-01-01 | End: 2019-01-01

## 2019-01-01 RX ORDER — ACETAMINOPHEN 500 MG
500 TABLET ORAL 3 TIMES DAILY
Start: 2019-01-01

## 2019-01-01 RX ORDER — IPRATROPIUM BROMIDE AND ALBUTEROL SULFATE 2.5; .5 MG/3ML; MG/3ML
3 SOLUTION RESPIRATORY (INHALATION)
Status: DISCONTINUED | OUTPATIENT
Start: 2019-01-01 | End: 2019-01-01 | Stop reason: HOSPADM

## 2019-01-01 RX ORDER — SODIUM CHLORIDE 0.9 % (FLUSH) 0.9 %
10 SYRINGE (ML) INJECTION AS NEEDED
Status: DISCONTINUED | OUTPATIENT
Start: 2019-01-01 | End: 2019-01-01 | Stop reason: HOSPADM

## 2019-01-01 RX ORDER — MAGNESIUM SULFATE HEPTAHYDRATE 40 MG/ML
4 INJECTION, SOLUTION INTRAVENOUS AS NEEDED
Status: DISCONTINUED | OUTPATIENT
Start: 2019-01-01 | End: 2019-01-01 | Stop reason: HOSPADM

## 2019-01-01 RX ORDER — ACETAMINOPHEN 500 MG
500 TABLET ORAL 3 TIMES DAILY
Status: DISCONTINUED | OUTPATIENT
Start: 2019-01-01 | End: 2019-01-01 | Stop reason: HOSPADM

## 2019-01-01 RX ORDER — IPRATROPIUM BROMIDE AND ALBUTEROL SULFATE 2.5; .5 MG/3ML; MG/3ML
3 SOLUTION RESPIRATORY (INHALATION) EVERY 4 HOURS PRN
Status: DISCONTINUED | OUTPATIENT
Start: 2019-01-01 | End: 2019-01-01 | Stop reason: HOSPADM

## 2019-01-01 RX ORDER — SENNOSIDES 8.6 MG
2 TABLET ORAL NIGHTLY PRN
Status: DISCONTINUED | OUTPATIENT
Start: 2019-01-01 | End: 2019-01-01 | Stop reason: HOSPADM

## 2019-01-01 RX ORDER — FUROSEMIDE 10 MG/ML
20 INJECTION INTRAMUSCULAR; INTRAVENOUS EVERY 8 HOURS PRN
Status: DISCONTINUED | OUTPATIENT
Start: 2019-01-01 | End: 2019-01-01 | Stop reason: HOSPADM

## 2019-01-01 RX ORDER — WARFARIN SODIUM 4 MG/1
TABLET ORAL
Qty: 30 TABLET | Refills: 2 | Status: SHIPPED | OUTPATIENT
Start: 2019-01-01 | End: 2019-01-01 | Stop reason: HOSPADM

## 2019-01-01 RX ORDER — DIGOXIN 250 MCG
TABLET ORAL
Qty: 30 TABLET | Refills: 2 | Status: SHIPPED | OUTPATIENT
Start: 2019-01-01

## 2019-01-01 RX ORDER — HYDROCODONE BITARTRATE AND ACETAMINOPHEN 5; 325 MG/1; MG/1
1 TABLET ORAL EVERY 6 HOURS
Status: DISCONTINUED | OUTPATIENT
Start: 2019-01-01 | End: 2019-01-01 | Stop reason: HOSPADM

## 2019-01-01 RX ORDER — WARFARIN SODIUM 5 MG/1
5 TABLET ORAL WEEKLY
COMMUNITY
End: 2019-01-01 | Stop reason: HOSPADM

## 2019-01-01 RX ORDER — ONDANSETRON 2 MG/ML
4 INJECTION INTRAMUSCULAR; INTRAVENOUS EVERY 6 HOURS PRN
Status: CANCELLED | OUTPATIENT
Start: 2019-01-01

## 2019-01-01 RX ORDER — SODIUM CHLORIDE 0.9 % (FLUSH) 0.9 %
3-10 SYRINGE (ML) INJECTION AS NEEDED
Status: DISCONTINUED | OUTPATIENT
Start: 2019-01-01 | End: 2019-01-01 | Stop reason: HOSPADM

## 2019-01-01 RX ORDER — HYDROCODONE BITARTRATE AND ACETAMINOPHEN 5; 325 MG/1; MG/1
1 TABLET ORAL EVERY 6 HOURS PRN
Status: DISCONTINUED | OUTPATIENT
Start: 2019-01-01 | End: 2019-01-01

## 2019-01-01 RX ORDER — LOSARTAN POTASSIUM AND HYDROCHLOROTHIAZIDE 25; 100 MG/1; MG/1
TABLET ORAL
Qty: 30 TABLET | Refills: 2 | Status: SHIPPED | OUTPATIENT
Start: 2019-01-01 | End: 2019-01-01 | Stop reason: HOSPADM

## 2019-01-01 RX ORDER — ACETAMINOPHEN 325 MG/1
650 TABLET ORAL 3 TIMES DAILY
Status: CANCELLED | OUTPATIENT
Start: 2019-01-01

## 2019-01-01 RX ORDER — NICOTINE POLACRILEX 4 MG
15 LOZENGE BUCCAL
Status: DISCONTINUED | OUTPATIENT
Start: 2019-01-01 | End: 2019-01-01 | Stop reason: HOSPADM

## 2019-01-01 RX ORDER — HYDROMORPHONE HYDROCHLORIDE 1 MG/ML
0.25 INJECTION, SOLUTION INTRAMUSCULAR; INTRAVENOUS; SUBCUTANEOUS ONCE
Status: COMPLETED | OUTPATIENT
Start: 2019-01-01 | End: 2019-01-01

## 2019-01-01 RX ORDER — FENTANYL CITRATE 50 UG/ML
25 INJECTION, SOLUTION INTRAMUSCULAR; INTRAVENOUS
Status: DISCONTINUED | OUTPATIENT
Start: 2019-01-01 | End: 2019-01-01

## 2019-01-01 RX ORDER — NICOTINE POLACRILEX 4 MG
15 LOZENGE BUCCAL
Status: DISCONTINUED | OUTPATIENT
Start: 2019-01-01 | End: 2019-01-01

## 2019-01-01 RX ORDER — ATORVASTATIN CALCIUM 40 MG/1
40 TABLET, FILM COATED ORAL DAILY
Status: DISCONTINUED | OUTPATIENT
Start: 2019-01-01 | End: 2019-01-01

## 2019-01-01 RX ORDER — GLYCOPYRROLATE 0.2 MG/ML
0.4 INJECTION INTRAMUSCULAR; INTRAVENOUS EVERY 6 HOURS
Status: DISCONTINUED | OUTPATIENT
Start: 2019-01-01 | End: 2019-01-01 | Stop reason: HOSPADM

## 2019-01-01 RX ORDER — DEXTROSE MONOHYDRATE 25 G/50ML
25 INJECTION, SOLUTION INTRAVENOUS
Status: DISCONTINUED | OUTPATIENT
Start: 2019-01-01 | End: 2019-01-01

## 2019-01-01 RX ORDER — SODIUM CHLORIDE 0.9 % (FLUSH) 0.9 %
3 SYRINGE (ML) INJECTION EVERY 12 HOURS SCHEDULED
Status: CANCELLED | OUTPATIENT
Start: 2019-01-01

## 2019-01-01 RX ORDER — DOXYCYCLINE 100 MG/1
100 CAPSULE ORAL EVERY 12 HOURS SCHEDULED
Status: DISCONTINUED | OUTPATIENT
Start: 2019-01-01 | End: 2019-01-01

## 2019-01-01 RX ORDER — HYDROCODONE BITARTRATE AND ACETAMINOPHEN 5; 325 MG/1; MG/1
1 TABLET ORAL EVERY 8 HOURS
Status: DISCONTINUED | OUTPATIENT
Start: 2019-01-01 | End: 2019-01-01

## 2019-01-01 RX ORDER — FAMOTIDINE 20 MG/1
20 TABLET, FILM COATED ORAL DAILY
Status: DISCONTINUED | OUTPATIENT
Start: 2019-01-01 | End: 2019-01-01 | Stop reason: HOSPADM

## 2019-01-01 RX ORDER — ACETAMINOPHEN 650 MG/1
650 SUPPOSITORY RECTAL EVERY 4 HOURS PRN
Status: CANCELLED | OUTPATIENT
Start: 2019-01-01

## 2019-01-01 RX ORDER — ACETAMINOPHEN 325 MG/1
650 TABLET ORAL EVERY 4 HOURS PRN
Status: DISCONTINUED | OUTPATIENT
Start: 2019-01-01 | End: 2019-01-01 | Stop reason: HOSPADM

## 2019-01-01 RX ORDER — SCOLOPAMINE TRANSDERMAL SYSTEM 1 MG/1
1 PATCH, EXTENDED RELEASE TRANSDERMAL
Status: DISCONTINUED | OUTPATIENT
Start: 2019-01-01 | End: 2019-01-01 | Stop reason: HOSPADM

## 2019-01-01 RX ORDER — ASPIRIN 81 MG/1
81 TABLET, CHEWABLE ORAL DAILY
Status: DISCONTINUED | OUTPATIENT
Start: 2019-01-01 | End: 2019-01-01 | Stop reason: HOSPADM

## 2019-01-01 RX ORDER — MULTIPLE VITAMINS W/ MINERALS TAB 9MG-400MCG
1 TAB ORAL DAILY
Status: DISCONTINUED | OUTPATIENT
Start: 2019-01-01 | End: 2019-01-01 | Stop reason: HOSPADM

## 2019-01-01 RX ORDER — LIDOCAINE 50 MG/G
1 PATCH TOPICAL
Status: DISCONTINUED | OUTPATIENT
Start: 2019-01-01 | End: 2019-01-01 | Stop reason: HOSPADM

## 2019-01-01 RX ORDER — DIGOXIN 250 MCG
250 TABLET ORAL DAILY
Status: DISCONTINUED | OUTPATIENT
Start: 2019-01-01 | End: 2019-01-01 | Stop reason: HOSPADM

## 2019-01-01 RX ORDER — SENNA AND DOCUSATE SODIUM 50; 8.6 MG/1; MG/1
2 TABLET, FILM COATED ORAL NIGHTLY
Status: DISCONTINUED | OUTPATIENT
Start: 2019-01-01 | End: 2019-01-01

## 2019-01-01 RX ORDER — SENNOSIDES 8.6 MG
TABLET ORAL
Qty: 30 TABLET | Refills: 2 | Status: SHIPPED | OUTPATIENT
Start: 2019-01-01

## 2019-01-01 RX ORDER — SODIUM CHLORIDE 0.9 % (FLUSH) 0.9 %
3-10 SYRINGE (ML) INJECTION AS NEEDED
Status: CANCELLED | OUTPATIENT
Start: 2019-01-01

## 2019-01-01 RX ORDER — NALOXONE HCL 0.4 MG/ML
0.1 VIAL (ML) INJECTION
Status: DISCONTINUED | OUTPATIENT
Start: 2019-01-01 | End: 2019-01-01

## 2019-01-01 RX ORDER — TRAZODONE HYDROCHLORIDE 50 MG/1
TABLET ORAL
Qty: 30 TABLET | Refills: 2 | Status: SHIPPED | OUTPATIENT
Start: 2019-01-01

## 2019-01-01 RX ORDER — TRAZODONE HYDROCHLORIDE 50 MG/1
50 TABLET ORAL NIGHTLY
Qty: 90 TABLET | Refills: 3 | Status: SHIPPED | OUTPATIENT
Start: 2019-01-01 | End: 2019-01-01 | Stop reason: SDUPTHER

## 2019-01-01 RX ORDER — ATORVASTATIN CALCIUM 40 MG/1
TABLET, FILM COATED ORAL
Qty: 30 TABLET | Refills: 2 | Status: SHIPPED | OUTPATIENT
Start: 2019-01-01

## 2019-01-01 RX ORDER — LIDOCAINE HYDROCHLORIDE 10 MG/ML
10 INJECTION, SOLUTION EPIDURAL; INFILTRATION; INTRACAUDAL; PERINEURAL ONCE
Status: COMPLETED | OUTPATIENT
Start: 2019-01-01 | End: 2019-01-01

## 2019-01-01 RX ORDER — HYDROMORPHONE HYDROCHLORIDE 1 MG/ML
0.25 INJECTION, SOLUTION INTRAMUSCULAR; INTRAVENOUS; SUBCUTANEOUS
Status: DISCONTINUED | OUTPATIENT
Start: 2019-01-01 | End: 2019-01-01

## 2019-01-01 RX ORDER — METOPROLOL SUCCINATE 50 MG/1
100 TABLET, EXTENDED RELEASE ORAL DAILY
Status: DISCONTINUED | OUTPATIENT
Start: 2019-01-01 | End: 2019-01-01 | Stop reason: HOSPADM

## 2019-01-01 RX ORDER — METOPROLOL SUCCINATE 100 MG/1
TABLET, EXTENDED RELEASE ORAL
Qty: 30 TABLET | Refills: 2 | Status: SHIPPED | OUTPATIENT
Start: 2019-01-01

## 2019-01-01 RX ORDER — MAGNESIUM SULFATE HEPTAHYDRATE 40 MG/ML
2 INJECTION, SOLUTION INTRAVENOUS AS NEEDED
Status: DISCONTINUED | OUTPATIENT
Start: 2019-01-01 | End: 2019-01-01 | Stop reason: HOSPADM

## 2019-01-01 RX ORDER — DEXTROSE MONOHYDRATE 25 G/50ML
25 INJECTION, SOLUTION INTRAVENOUS
Status: DISCONTINUED | OUTPATIENT
Start: 2019-01-01 | End: 2019-01-01 | Stop reason: HOSPADM

## 2019-01-01 RX ORDER — FUROSEMIDE 10 MG/ML
20 INJECTION INTRAMUSCULAR; INTRAVENOUS EVERY 8 HOURS PRN
Status: CANCELLED | OUTPATIENT
Start: 2019-01-01

## 2019-01-01 RX ORDER — HEPARIN SODIUM 5000 [USP'U]/ML
5000 INJECTION, SOLUTION INTRAVENOUS; SUBCUTANEOUS EVERY 8 HOURS SCHEDULED
Status: DISCONTINUED | OUTPATIENT
Start: 2019-01-01 | End: 2019-01-01

## 2019-01-01 RX ORDER — BUMETANIDE 0.25 MG/ML
2.5 INJECTION INTRAMUSCULAR; INTRAVENOUS ONCE
Status: COMPLETED | OUTPATIENT
Start: 2019-01-01 | End: 2019-01-01

## 2019-01-01 RX ORDER — HYDROMORPHONE HYDROCHLORIDE 1 MG/ML
0.25 INJECTION, SOLUTION INTRAMUSCULAR; INTRAVENOUS; SUBCUTANEOUS
Status: CANCELLED | OUTPATIENT
Start: 2019-01-01

## 2019-01-01 RX ORDER — HALOPERIDOL 5 MG/ML
1 INJECTION INTRAMUSCULAR EVERY 4 HOURS PRN
Status: CANCELLED | OUTPATIENT
Start: 2019-01-01

## 2019-01-01 RX ORDER — ATORVASTATIN CALCIUM 40 MG/1
40 TABLET, FILM COATED ORAL NIGHTLY
Status: DISCONTINUED | OUTPATIENT
Start: 2019-01-01 | End: 2019-01-01 | Stop reason: HOSPADM

## 2019-01-01 RX ADMIN — CLINDAMYCIN PHOSPHATE 600 MG: 600 INJECTION, SOLUTION INTRAVENOUS at 08:39

## 2019-01-01 RX ADMIN — FENTANYL CITRATE 25 MCG: 50 INJECTION INTRAMUSCULAR; INTRAVENOUS at 05:07

## 2019-01-01 RX ADMIN — METRONIDAZOLE 500 MG: 500 INJECTION, SOLUTION INTRAVENOUS at 10:49

## 2019-01-01 RX ADMIN — IPRATROPIUM BROMIDE AND ALBUTEROL SULFATE 3 ML: 2.5; .5 SOLUTION RESPIRATORY (INHALATION) at 12:33

## 2019-01-01 RX ADMIN — ASPIRIN 81 MG 81 MG: 81 TABLET ORAL at 08:18

## 2019-01-01 RX ADMIN — IPRATROPIUM BROMIDE AND ALBUTEROL SULFATE 3 ML: 2.5; .5 SOLUTION RESPIRATORY (INHALATION) at 12:19

## 2019-01-01 RX ADMIN — AZTREONAM 2 G: 2 INJECTION, POWDER, LYOPHILIZED, FOR SOLUTION INTRAMUSCULAR; INTRAVENOUS at 06:23

## 2019-01-01 RX ADMIN — AZTREONAM 2 G: 2 INJECTION, POWDER, LYOPHILIZED, FOR SOLUTION INTRAMUSCULAR; INTRAVENOUS at 00:51

## 2019-01-01 RX ADMIN — DIGOXIN 250 MCG: 250 TABLET ORAL at 13:14

## 2019-01-01 RX ADMIN — HYDROMORPHONE HYDROCHLORIDE 0.25 MG: 1 INJECTION, SOLUTION INTRAMUSCULAR; INTRAVENOUS; SUBCUTANEOUS at 18:18

## 2019-01-01 RX ADMIN — ACETAMINOPHEN 500 MG: 500 TABLET, FILM COATED ORAL at 08:35

## 2019-01-01 RX ADMIN — LIDOCAINE 1 PATCH: 50 PATCH CUTANEOUS at 22:33

## 2019-01-01 RX ADMIN — AZTREONAM 2 G: 2 INJECTION, POWDER, LYOPHILIZED, FOR SOLUTION INTRAMUSCULAR; INTRAVENOUS at 05:26

## 2019-01-01 RX ADMIN — AZTREONAM 2 G: 2 INJECTION, POWDER, LYOPHILIZED, FOR SOLUTION INTRAMUSCULAR; INTRAVENOUS at 13:30

## 2019-01-01 RX ADMIN — POLYETHYLENE GLYCOL 3350 17 G: 17 POWDER, FOR SOLUTION ORAL at 20:01

## 2019-01-01 RX ADMIN — CASTOR OIL AND BALSAM, PERU: 788; 87 OINTMENT TOPICAL at 20:12

## 2019-01-01 RX ADMIN — METRONIDAZOLE 500 MG: 500 INJECTION, SOLUTION INTRAVENOUS at 01:54

## 2019-01-01 RX ADMIN — METOPROLOL SUCCINATE 100 MG: 100 TABLET, EXTENDED RELEASE ORAL at 08:35

## 2019-01-01 RX ADMIN — INSULIN LISPRO 2 UNITS: 100 INJECTION, SOLUTION INTRAVENOUS; SUBCUTANEOUS at 12:04

## 2019-01-01 RX ADMIN — SODIUM CHLORIDE 2 G: 9 INJECTION, SOLUTION INTRAVENOUS at 22:39

## 2019-01-01 RX ADMIN — HYDROMORPHONE HYDROCHLORIDE 0.25 MG: 1 INJECTION, SOLUTION INTRAMUSCULAR; INTRAVENOUS; SUBCUTANEOUS at 06:24

## 2019-01-01 RX ADMIN — FENTANYL CITRATE 25 MCG: 50 INJECTION INTRAMUSCULAR; INTRAVENOUS at 16:30

## 2019-01-01 RX ADMIN — DIGOXIN 250 MCG: 250 TABLET ORAL at 09:37

## 2019-01-01 RX ADMIN — HYDROCODONE BITARTRATE AND ACETAMINOPHEN 1 TABLET: 5; 325 TABLET ORAL at 10:48

## 2019-01-01 RX ADMIN — ENOXAPARIN SODIUM 30 MG: 30 INJECTION SUBCUTANEOUS at 13:30

## 2019-01-01 RX ADMIN — IPRATROPIUM BROMIDE AND ALBUTEROL SULFATE 3 ML: 2.5; .5 SOLUTION RESPIRATORY (INHALATION) at 20:20

## 2019-01-01 RX ADMIN — FENTANYL CITRATE 25 MCG: 50 INJECTION INTRAMUSCULAR; INTRAVENOUS at 02:08

## 2019-01-01 RX ADMIN — INSULIN LISPRO 2 UNITS: 100 INJECTION, SOLUTION INTRAVENOUS; SUBCUTANEOUS at 17:27

## 2019-01-01 RX ADMIN — AZTREONAM 2 G: 2 INJECTION, POWDER, LYOPHILIZED, FOR SOLUTION INTRAMUSCULAR; INTRAVENOUS at 21:53

## 2019-01-01 RX ADMIN — MULTIPLE VITAMINS W/ MINERALS TAB 1 TABLET: TAB ORAL at 08:52

## 2019-01-01 RX ADMIN — IPRATROPIUM BROMIDE AND ALBUTEROL SULFATE 3 ML: 2.5; .5 SOLUTION RESPIRATORY (INHALATION) at 20:39

## 2019-01-01 RX ADMIN — CASTOR OIL AND BALSAM, PERU: 788; 87 OINTMENT TOPICAL at 10:49

## 2019-01-01 RX ADMIN — HYDROCODONE BITARTRATE AND ACETAMINOPHEN 1 TABLET: 5; 325 TABLET ORAL at 17:26

## 2019-01-01 RX ADMIN — CASTOR OIL AND BALSAM, PERU: 788; 87 OINTMENT TOPICAL at 20:01

## 2019-01-01 RX ADMIN — LIDOCAINE 1 PATCH: 50 PATCH CUTANEOUS at 20:06

## 2019-01-01 RX ADMIN — ATORVASTATIN CALCIUM 40 MG: 40 TABLET, FILM COATED ORAL at 20:06

## 2019-01-01 RX ADMIN — HYDROCODONE BITARTRATE AND ACETAMINOPHEN 1 TABLET: 5; 325 TABLET ORAL at 05:52

## 2019-01-01 RX ADMIN — IPRATROPIUM BROMIDE AND ALBUTEROL SULFATE 3 ML: 2.5; .5 SOLUTION RESPIRATORY (INHALATION) at 15:55

## 2019-01-01 RX ADMIN — AZTREONAM 2 G: 2 INJECTION, POWDER, LYOPHILIZED, FOR SOLUTION INTRAMUSCULAR; INTRAVENOUS at 06:03

## 2019-01-01 RX ADMIN — HYDROCODONE BITARTRATE AND ACETAMINOPHEN 1 TABLET: 5; 325 TABLET ORAL at 00:10

## 2019-01-01 RX ADMIN — ATORVASTATIN CALCIUM 40 MG: 40 TABLET, FILM COATED ORAL at 20:24

## 2019-01-01 RX ADMIN — HALOPERIDOL LACTATE 1 MG: 5 INJECTION, SOLUTION INTRAMUSCULAR at 03:52

## 2019-01-01 RX ADMIN — DIGOXIN 250 MCG: 250 TABLET ORAL at 08:35

## 2019-01-01 RX ADMIN — HYDROMORPHONE HYDROCHLORIDE 0.25 MG: 1 INJECTION, SOLUTION INTRAMUSCULAR; INTRAVENOUS; SUBCUTANEOUS at 05:05

## 2019-01-01 RX ADMIN — IOPAMIDOL 50 ML: 755 INJECTION, SOLUTION INTRAVENOUS at 05:32

## 2019-01-01 RX ADMIN — SODIUM CHLORIDE, PRESERVATIVE FREE 3 ML: 5 INJECTION INTRAVENOUS at 20:08

## 2019-01-01 RX ADMIN — ACETAMINOPHEN 500 MG: 500 TABLET, FILM COATED ORAL at 20:06

## 2019-01-01 RX ADMIN — AZTREONAM 2 G: 2 INJECTION, POWDER, LYOPHILIZED, FOR SOLUTION INTRAMUSCULAR; INTRAVENOUS at 15:00

## 2019-01-01 RX ADMIN — ACETAMINOPHEN 500 MG: 500 TABLET, FILM COATED ORAL at 22:31

## 2019-01-01 RX ADMIN — ASPIRIN 81 MG 81 MG: 81 TABLET ORAL at 08:41

## 2019-01-01 RX ADMIN — DIGOXIN 250 MCG: 250 TABLET ORAL at 08:18

## 2019-01-01 RX ADMIN — CLINDAMYCIN PHOSPHATE 600 MG: 600 INJECTION, SOLUTION INTRAVENOUS at 08:53

## 2019-01-01 RX ADMIN — INSULIN LISPRO 2 UNITS: 100 INJECTION, SOLUTION INTRAVENOUS; SUBCUTANEOUS at 12:34

## 2019-01-01 RX ADMIN — FENTANYL CITRATE 25 MCG: 50 INJECTION INTRAMUSCULAR; INTRAVENOUS at 20:39

## 2019-01-01 RX ADMIN — POLYETHYLENE GLYCOL 3350 17 G: 17 POWDER, FOR SOLUTION ORAL at 09:34

## 2019-01-01 RX ADMIN — FENTANYL CITRATE 25 MCG: 50 INJECTION INTRAMUSCULAR; INTRAVENOUS at 04:27

## 2019-01-01 RX ADMIN — ASPIRIN 81 MG 81 MG: 81 TABLET ORAL at 08:35

## 2019-01-01 RX ADMIN — CLINDAMYCIN PHOSPHATE 600 MG: 600 INJECTION, SOLUTION INTRAVENOUS at 16:41

## 2019-01-01 RX ADMIN — HYDROMORPHONE HYDROCHLORIDE: 10 INJECTION INTRAMUSCULAR; INTRAVENOUS; SUBCUTANEOUS at 12:39

## 2019-01-01 RX ADMIN — CLINDAMYCIN PHOSPHATE 600 MG: 600 INJECTION, SOLUTION INTRAVENOUS at 08:43

## 2019-01-01 RX ADMIN — SODIUM CHLORIDE, PRESERVATIVE FREE 10 ML: 5 INJECTION INTRAVENOUS at 08:42

## 2019-01-01 RX ADMIN — CASTOR OIL AND BALSAM, PERU: 788; 87 OINTMENT TOPICAL at 22:32

## 2019-01-01 RX ADMIN — IPRATROPIUM BROMIDE AND ALBUTEROL SULFATE 3 ML: 2.5; .5 SOLUTION RESPIRATORY (INHALATION) at 09:12

## 2019-01-01 RX ADMIN — FENTANYL CITRATE 25 MCG: 50 INJECTION INTRAMUSCULAR; INTRAVENOUS at 09:08

## 2019-01-01 RX ADMIN — MULTIPLE VITAMINS W/ MINERALS TAB 1 TABLET: TAB ORAL at 08:18

## 2019-01-01 RX ADMIN — INSULIN LISPRO 2 UNITS: 100 INJECTION, SOLUTION INTRAVENOUS; SUBCUTANEOUS at 20:54

## 2019-01-01 RX ADMIN — IPRATROPIUM BROMIDE AND ALBUTEROL SULFATE 3 ML: 2.5; .5 SOLUTION RESPIRATORY (INHALATION) at 20:03

## 2019-01-01 RX ADMIN — FAMOTIDINE 20 MG: 20 TABLET ORAL at 09:37

## 2019-01-01 RX ADMIN — ENOXAPARIN SODIUM 30 MG: 30 INJECTION SUBCUTANEOUS at 16:21

## 2019-01-01 RX ADMIN — AZTREONAM 2 G: 2 INJECTION, POWDER, LYOPHILIZED, FOR SOLUTION INTRAMUSCULAR; INTRAVENOUS at 06:16

## 2019-01-01 RX ADMIN — IPRATROPIUM BROMIDE AND ALBUTEROL SULFATE 3 ML: 2.5; .5 SOLUTION RESPIRATORY (INHALATION) at 07:14

## 2019-01-01 RX ADMIN — POLYETHYLENE GLYCOL 3350 17 G: 17 POWDER, FOR SOLUTION ORAL at 08:41

## 2019-01-01 RX ADMIN — METOPROLOL SUCCINATE 100 MG: 100 TABLET, EXTENDED RELEASE ORAL at 09:35

## 2019-01-01 RX ADMIN — POLYETHYLENE GLYCOL 3350 17 G: 17 POWDER, FOR SOLUTION ORAL at 22:36

## 2019-01-01 RX ADMIN — ACETAMINOPHEN 500 MG: 500 TABLET, FILM COATED ORAL at 09:34

## 2019-01-01 RX ADMIN — AZTREONAM 2 G: 2 INJECTION, POWDER, LYOPHILIZED, FOR SOLUTION INTRAMUSCULAR; INTRAVENOUS at 22:36

## 2019-01-01 RX ADMIN — IPRATROPIUM BROMIDE AND ALBUTEROL SULFATE 3 ML: 2.5; .5 SOLUTION RESPIRATORY (INHALATION) at 12:50

## 2019-01-01 RX ADMIN — MULTIPLE VITAMINS W/ MINERALS TAB 1 TABLET: TAB ORAL at 08:19

## 2019-01-01 RX ADMIN — HYDROCODONE BITARTRATE AND ACETAMINOPHEN 1 TABLET: 5; 325 TABLET ORAL at 06:16

## 2019-01-01 RX ADMIN — DIGOXIN 250 MCG: 250 TABLET ORAL at 08:41

## 2019-01-01 RX ADMIN — HYDROMORPHONE HYDROCHLORIDE 0.25 MG: 1 INJECTION, SOLUTION INTRAMUSCULAR; INTRAVENOUS; SUBCUTANEOUS at 12:30

## 2019-01-01 RX ADMIN — POLYETHYLENE GLYCOL 3350 17 G: 17 POWDER, FOR SOLUTION ORAL at 20:55

## 2019-01-01 RX ADMIN — HYDROCODONE BITARTRATE AND ACETAMINOPHEN 1 TABLET: 5; 325 TABLET ORAL at 14:14

## 2019-01-01 RX ADMIN — CASTOR OIL AND BALSAM, PERU: 788; 87 OINTMENT TOPICAL at 08:19

## 2019-01-01 RX ADMIN — MULTIPLE VITAMINS W/ MINERALS TAB 1 TABLET: TAB ORAL at 08:39

## 2019-01-01 RX ADMIN — CLINDAMYCIN PHOSPHATE 600 MG: 600 INJECTION, SOLUTION INTRAVENOUS at 00:02

## 2019-01-01 RX ADMIN — CLINDAMYCIN PHOSPHATE 600 MG: 600 INJECTION, SOLUTION INTRAVENOUS at 08:18

## 2019-01-01 RX ADMIN — SODIUM CHLORIDE, PRESERVATIVE FREE 3 ML: 5 INJECTION INTRAVENOUS at 20:06

## 2019-01-01 RX ADMIN — CLINDAMYCIN PHOSPHATE 600 MG: 600 INJECTION, SOLUTION INTRAVENOUS at 16:22

## 2019-01-01 RX ADMIN — IPRATROPIUM BROMIDE AND ALBUTEROL SULFATE 3 ML: 2.5; .5 SOLUTION RESPIRATORY (INHALATION) at 12:35

## 2019-01-01 RX ADMIN — CLINDAMYCIN PHOSPHATE 600 MG: 600 INJECTION, SOLUTION INTRAVENOUS at 15:00

## 2019-01-01 RX ADMIN — CLINDAMYCIN PHOSPHATE 600 MG: 600 INJECTION, SOLUTION INTRAVENOUS at 02:04

## 2019-01-01 RX ADMIN — ACETAMINOPHEN 650 MG: 325 TABLET, FILM COATED ORAL at 14:50

## 2019-01-01 RX ADMIN — HYDROCODONE BITARTRATE AND ACETAMINOPHEN 1 TABLET: 5; 325 TABLET ORAL at 18:57

## 2019-01-01 RX ADMIN — FENTANYL CITRATE 25 MCG: 50 INJECTION INTRAMUSCULAR; INTRAVENOUS at 13:07

## 2019-01-01 RX ADMIN — PHYTONADIONE 10 MG: 10 INJECTION, EMULSION INTRAMUSCULAR; INTRAVENOUS; SUBCUTANEOUS at 23:01

## 2019-01-01 RX ADMIN — HYDROCODONE BITARTRATE AND ACETAMINOPHEN 1 TABLET: 5; 325 TABLET ORAL at 13:18

## 2019-01-01 RX ADMIN — AZTREONAM 2 G: 2 INJECTION, POWDER, LYOPHILIZED, FOR SOLUTION INTRAMUSCULAR; INTRAVENOUS at 21:28

## 2019-01-01 RX ADMIN — MAGNESIUM SULFATE HEPTAHYDRATE 4 G: 40 INJECTION, SOLUTION INTRAVENOUS at 13:14

## 2019-01-01 RX ADMIN — HYDROMORPHONE HYDROCHLORIDE 0.25 MG: 1 INJECTION, SOLUTION INTRAMUSCULAR; INTRAVENOUS; SUBCUTANEOUS at 03:52

## 2019-01-01 RX ADMIN — SODIUM CHLORIDE, PRESERVATIVE FREE 3 ML: 5 INJECTION INTRAVENOUS at 22:32

## 2019-01-01 RX ADMIN — DIGOXIN 250 MCG: 250 TABLET ORAL at 08:20

## 2019-01-01 RX ADMIN — IPRATROPIUM BROMIDE AND ALBUTEROL SULFATE 3 ML: 2.5; .5 SOLUTION RESPIRATORY (INHALATION) at 13:00

## 2019-01-01 RX ADMIN — KETOROLAC TROMETHAMINE 15 MG: 30 INJECTION, SOLUTION INTRAMUSCULAR; INTRAVENOUS at 17:47

## 2019-01-01 RX ADMIN — ENOXAPARIN SODIUM 30 MG: 30 INJECTION SUBCUTANEOUS at 12:36

## 2019-01-01 RX ADMIN — AZTREONAM 2 G: 2 INJECTION, POWDER, LYOPHILIZED, FOR SOLUTION INTRAMUSCULAR; INTRAVENOUS at 15:21

## 2019-01-01 RX ADMIN — HYDROMORPHONE HYDROCHLORIDE 0.25 MG: 1 INJECTION, SOLUTION INTRAMUSCULAR; INTRAVENOUS; SUBCUTANEOUS at 02:34

## 2019-01-01 RX ADMIN — ENOXAPARIN SODIUM 30 MG: 30 INJECTION SUBCUTANEOUS at 13:13

## 2019-01-01 RX ADMIN — IPRATROPIUM BROMIDE AND ALBUTEROL SULFATE 3 ML: 2.5; .5 SOLUTION RESPIRATORY (INHALATION) at 09:34

## 2019-01-01 RX ADMIN — INSULIN LISPRO 2 UNITS: 100 INJECTION, SOLUTION INTRAVENOUS; SUBCUTANEOUS at 17:32

## 2019-01-01 RX ADMIN — FENTANYL CITRATE 25 MCG: 50 INJECTION INTRAMUSCULAR; INTRAVENOUS at 10:04

## 2019-01-01 RX ADMIN — HYDROCODONE BITARTRATE AND ACETAMINOPHEN 1 TABLET: 5; 325 TABLET ORAL at 13:30

## 2019-01-01 RX ADMIN — FAMOTIDINE 20 MG: 20 TABLET ORAL at 08:41

## 2019-01-01 RX ADMIN — IPRATROPIUM BROMIDE AND ALBUTEROL SULFATE 3 ML: 2.5; .5 SOLUTION RESPIRATORY (INHALATION) at 06:59

## 2019-01-01 RX ADMIN — HYDROMORPHONE HYDROCHLORIDE 0.25 MG: 1 INJECTION, SOLUTION INTRAMUSCULAR; INTRAVENOUS; SUBCUTANEOUS at 15:40

## 2019-01-01 RX ADMIN — HYDROCODONE BITARTRATE AND ACETAMINOPHEN 1 TABLET: 5; 325 TABLET ORAL at 04:28

## 2019-01-01 RX ADMIN — CASTOR OIL AND BALSAM, PERU: 788; 87 OINTMENT TOPICAL at 08:35

## 2019-01-01 RX ADMIN — SCOPALAMINE 1 PATCH: 1 PATCH, EXTENDED RELEASE TRANSDERMAL at 12:39

## 2019-01-01 RX ADMIN — ACETAMINOPHEN 650 MG: 325 TABLET, FILM COATED ORAL at 12:19

## 2019-01-01 RX ADMIN — INSULIN LISPRO 2 UNITS: 100 INJECTION, SOLUTION INTRAVENOUS; SUBCUTANEOUS at 20:34

## 2019-01-01 RX ADMIN — HYDROCODONE BITARTRATE AND ACETAMINOPHEN 1 TABLET: 5; 325 TABLET ORAL at 18:16

## 2019-01-01 RX ADMIN — FENTANYL CITRATE 25 MCG: 50 INJECTION INTRAMUSCULAR; INTRAVENOUS at 09:15

## 2019-01-01 RX ADMIN — MULTIPLE VITAMINS W/ MINERALS TAB 1 TABLET: TAB ORAL at 09:37

## 2019-01-01 RX ADMIN — INSULIN LISPRO 2 UNITS: 100 INJECTION, SOLUTION INTRAVENOUS; SUBCUTANEOUS at 20:35

## 2019-01-01 RX ADMIN — HYDROMORPHONE HYDROCHLORIDE 0.25 MG: 1 INJECTION, SOLUTION INTRAMUSCULAR; INTRAVENOUS; SUBCUTANEOUS at 22:46

## 2019-01-01 RX ADMIN — AZTREONAM 2 G: 2 INJECTION, POWDER, LYOPHILIZED, FOR SOLUTION INTRAMUSCULAR; INTRAVENOUS at 05:52

## 2019-01-01 RX ADMIN — FENTANYL CITRATE 25 MCG: 50 INJECTION INTRAMUSCULAR; INTRAVENOUS at 02:10

## 2019-01-01 RX ADMIN — ASPIRIN 81 MG 81 MG: 81 TABLET ORAL at 13:14

## 2019-01-01 RX ADMIN — DOXYCYCLINE 100 MG: 100 CAPSULE ORAL at 08:29

## 2019-01-01 RX ADMIN — SODIUM CHLORIDE, PRESERVATIVE FREE 3 ML: 5 INJECTION INTRAVENOUS at 20:12

## 2019-01-01 RX ADMIN — METRONIDAZOLE 500 MG: 500 INJECTION, SOLUTION INTRAVENOUS at 10:48

## 2019-01-01 RX ADMIN — INSULIN LISPRO 2 UNITS: 100 INJECTION, SOLUTION INTRAVENOUS; SUBCUTANEOUS at 11:54

## 2019-01-01 RX ADMIN — FENTANYL CITRATE 25 MCG: 50 INJECTION INTRAMUSCULAR; INTRAVENOUS at 11:55

## 2019-01-01 RX ADMIN — HYDROCODONE BITARTRATE AND ACETAMINOPHEN 1 TABLET: 5; 325 TABLET ORAL at 20:11

## 2019-01-01 RX ADMIN — AZTREONAM 2 G: 2 INJECTION, POWDER, LYOPHILIZED, FOR SOLUTION INTRAMUSCULAR; INTRAVENOUS at 05:13

## 2019-01-01 RX ADMIN — CASTOR OIL AND BALSAM, PERU: 788; 87 OINTMENT TOPICAL at 08:18

## 2019-01-01 RX ADMIN — FAMOTIDINE 20 MG: 20 TABLET ORAL at 08:20

## 2019-01-01 RX ADMIN — ATORVASTATIN CALCIUM 40 MG: 40 TABLET, FILM COATED ORAL at 22:31

## 2019-01-01 RX ADMIN — FAMOTIDINE 20 MG: 20 TABLET ORAL at 08:35

## 2019-01-01 RX ADMIN — FAMOTIDINE 20 MG: 20 TABLET ORAL at 08:52

## 2019-01-01 RX ADMIN — SENNOSIDES 17.2 MG: 8.6 TABLET, FILM COATED ORAL at 20:06

## 2019-01-01 RX ADMIN — FENTANYL CITRATE 25 MCG: 50 INJECTION INTRAMUSCULAR; INTRAVENOUS at 19:52

## 2019-01-01 RX ADMIN — VANCOMYCIN HYDROCHLORIDE 1000 MG: 1 INJECTION, SOLUTION INTRAVENOUS at 05:33

## 2019-01-01 RX ADMIN — MAGNESIUM SULFATE HEPTAHYDRATE 2 G: 40 INJECTION, SOLUTION INTRAVENOUS at 08:19

## 2019-01-01 RX ADMIN — HYDROMORPHONE HYDROCHLORIDE 0.25 MG: 1 INJECTION, SOLUTION INTRAMUSCULAR; INTRAVENOUS; SUBCUTANEOUS at 00:59

## 2019-01-01 RX ADMIN — CASTOR OIL AND BALSAM, PERU: 788; 87 OINTMENT TOPICAL at 20:55

## 2019-01-01 RX ADMIN — IPRATROPIUM BROMIDE AND ALBUTEROL SULFATE 3 ML: 2.5; .5 SOLUTION RESPIRATORY (INHALATION) at 08:29

## 2019-01-01 RX ADMIN — INSULIN LISPRO 2 UNITS: 100 INJECTION, SOLUTION INTRAVENOUS; SUBCUTANEOUS at 08:19

## 2019-01-01 RX ADMIN — METRONIDAZOLE 500 MG: 500 INJECTION, SOLUTION INTRAVENOUS at 19:15

## 2019-01-01 RX ADMIN — CASTOR OIL AND BALSAM, PERU: 788; 87 OINTMENT TOPICAL at 08:42

## 2019-01-01 RX ADMIN — DIGOXIN 250 MCG: 250 TABLET ORAL at 08:51

## 2019-01-01 RX ADMIN — CASTOR OIL AND BALSAM, PERU: 788; 87 OINTMENT TOPICAL at 20:06

## 2019-01-01 RX ADMIN — AZTREONAM 2 G: 2 INJECTION, POWDER, LYOPHILIZED, FOR SOLUTION INTRAMUSCULAR; INTRAVENOUS at 13:14

## 2019-01-01 RX ADMIN — FENTANYL CITRATE 25 MCG: 50 INJECTION INTRAMUSCULAR; INTRAVENOUS at 20:18

## 2019-01-01 RX ADMIN — ASPIRIN 81 MG 81 MG: 81 TABLET ORAL at 08:50

## 2019-01-01 RX ADMIN — VANCOMYCIN HYDROCHLORIDE 1000 MG: 1 INJECTION, SOLUTION INTRAVENOUS at 00:32

## 2019-01-01 RX ADMIN — IPRATROPIUM BROMIDE AND ALBUTEROL SULFATE 3 ML: 2.5; .5 SOLUTION RESPIRATORY (INHALATION) at 20:30

## 2019-01-01 RX ADMIN — DOXYCYCLINE 100 MG: 100 CAPSULE ORAL at 00:32

## 2019-01-01 RX ADMIN — FENTANYL CITRATE 25 MCG: 50 INJECTION INTRAMUSCULAR; INTRAVENOUS at 23:16

## 2019-01-01 RX ADMIN — ENOXAPARIN SODIUM 30 MG: 30 INJECTION SUBCUTANEOUS at 15:05

## 2019-01-01 RX ADMIN — CLINDAMYCIN PHOSPHATE 600 MG: 600 INJECTION, SOLUTION INTRAVENOUS at 00:51

## 2019-01-01 RX ADMIN — SODIUM CHLORIDE, PRESERVATIVE FREE 3 ML: 5 INJECTION INTRAVENOUS at 20:25

## 2019-01-01 RX ADMIN — HYDROMORPHONE HYDROCHLORIDE 0.25 MG: 1 INJECTION, SOLUTION INTRAMUSCULAR; INTRAVENOUS; SUBCUTANEOUS at 08:03

## 2019-01-01 RX ADMIN — ATORVASTATIN CALCIUM 40 MG: 40 TABLET, FILM COATED ORAL at 20:53

## 2019-01-01 RX ADMIN — ENOXAPARIN SODIUM 30 MG: 30 INJECTION SUBCUTANEOUS at 14:28

## 2019-01-01 RX ADMIN — METOPROLOL SUCCINATE 100 MG: 100 TABLET, EXTENDED RELEASE ORAL at 08:41

## 2019-01-01 RX ADMIN — CLINDAMYCIN PHOSPHATE 600 MG: 600 INJECTION, SOLUTION INTRAVENOUS at 08:24

## 2019-01-01 RX ADMIN — ATORVASTATIN CALCIUM 40 MG: 40 TABLET, FILM COATED ORAL at 20:00

## 2019-01-01 RX ADMIN — HYDROCODONE BITARTRATE AND ACETAMINOPHEN 1 TABLET: 5; 325 TABLET ORAL at 00:52

## 2019-01-01 RX ADMIN — IPRATROPIUM BROMIDE AND ALBUTEROL SULFATE 3 ML: 2.5; .5 SOLUTION RESPIRATORY (INHALATION) at 16:45

## 2019-01-01 RX ADMIN — CLINDAMYCIN PHOSPHATE 600 MG: 600 INJECTION, SOLUTION INTRAVENOUS at 00:10

## 2019-01-01 RX ADMIN — TRAZODONE HYDROCHLORIDE 50 MG: 50 TABLET ORAL at 20:06

## 2019-01-01 RX ADMIN — MULTIPLE VITAMINS W/ MINERALS TAB 1 TABLET: TAB ORAL at 13:14

## 2019-01-01 RX ADMIN — INSULIN LISPRO 2 UNITS: 100 INJECTION, SOLUTION INTRAVENOUS; SUBCUTANEOUS at 22:16

## 2019-01-01 RX ADMIN — INSULIN LISPRO 2 UNITS: 100 INJECTION, SOLUTION INTRAVENOUS; SUBCUTANEOUS at 17:26

## 2019-01-01 RX ADMIN — HYDROMORPHONE HYDROCHLORIDE 0.25 MG: 1 INJECTION, SOLUTION INTRAMUSCULAR; INTRAVENOUS; SUBCUTANEOUS at 07:16

## 2019-01-01 RX ADMIN — IPRATROPIUM BROMIDE AND ALBUTEROL SULFATE 3 ML: 2.5; .5 SOLUTION RESPIRATORY (INHALATION) at 12:58

## 2019-01-01 RX ADMIN — FENTANYL CITRATE 25 MCG: 50 INJECTION INTRAMUSCULAR; INTRAVENOUS at 00:02

## 2019-01-01 RX ADMIN — HYDROCODONE BITARTRATE AND ACETAMINOPHEN 1 TABLET: 5; 325 TABLET ORAL at 11:11

## 2019-01-01 RX ADMIN — HYDROCODONE BITARTRATE AND ACETAMINOPHEN 1 TABLET: 5; 325 TABLET ORAL at 05:00

## 2019-01-01 RX ADMIN — IPRATROPIUM BROMIDE AND ALBUTEROL SULFATE 3 ML: 2.5; .5 SOLUTION RESPIRATORY (INHALATION) at 20:00

## 2019-01-01 RX ADMIN — CASTOR OIL AND BALSAM, PERU: 788; 87 OINTMENT TOPICAL at 08:53

## 2019-01-01 RX ADMIN — SODIUM CHLORIDE 2 G: 9 INJECTION, SOLUTION INTRAVENOUS at 04:55

## 2019-01-01 RX ADMIN — CLINDAMYCIN PHOSPHATE 600 MG: 600 INJECTION, SOLUTION INTRAVENOUS at 16:21

## 2019-01-01 RX ADMIN — POLYETHYLENE GLYCOL 3350 17 G: 17 POWDER, FOR SOLUTION ORAL at 08:18

## 2019-01-01 RX ADMIN — BUMETANIDE 2.5 MG: 0.25 INJECTION INTRAMUSCULAR; INTRAVENOUS at 08:18

## 2019-01-01 RX ADMIN — KETOROLAC TROMETHAMINE 15 MG: 30 INJECTION, SOLUTION INTRAMUSCULAR; INTRAVENOUS at 02:34

## 2019-01-01 RX ADMIN — CLINDAMYCIN PHOSPHATE 600 MG: 600 INJECTION, SOLUTION INTRAVENOUS at 23:26

## 2019-01-01 RX ADMIN — AZTREONAM 2 G: 2 INJECTION, POWDER, LYOPHILIZED, FOR SOLUTION INTRAMUSCULAR; INTRAVENOUS at 14:24

## 2019-01-01 RX ADMIN — AZTREONAM 2 G: 2 INJECTION, POWDER, LYOPHILIZED, FOR SOLUTION INTRAMUSCULAR; INTRAVENOUS at 02:03

## 2019-01-01 RX ADMIN — FENTANYL CITRATE 25 MCG: 50 INJECTION INTRAMUSCULAR; INTRAVENOUS at 01:56

## 2019-01-01 RX ADMIN — INSULIN LISPRO 2 UNITS: 100 INJECTION, SOLUTION INTRAVENOUS; SUBCUTANEOUS at 21:55

## 2019-01-01 RX ADMIN — CASTOR OIL AND BALSAM, PERU: 788; 87 OINTMENT TOPICAL at 10:47

## 2019-01-01 RX ADMIN — CASTOR OIL AND BALSAM, PERU: 788; 87 OINTMENT TOPICAL at 20:39

## 2019-01-01 RX ADMIN — IPRATROPIUM BROMIDE AND ALBUTEROL SULFATE 3 ML: 2.5; .5 SOLUTION RESPIRATORY (INHALATION) at 20:04

## 2019-01-01 RX ADMIN — AZTREONAM 2 G: 2 INJECTION, POWDER, LYOPHILIZED, FOR SOLUTION INTRAMUSCULAR; INTRAVENOUS at 06:56

## 2019-01-01 RX ADMIN — ATORVASTATIN CALCIUM 40 MG: 40 TABLET, FILM COATED ORAL at 20:11

## 2019-01-01 RX ADMIN — SODIUM CHLORIDE, PRESERVATIVE FREE 10 ML: 5 INJECTION INTRAVENOUS at 09:37

## 2019-01-01 RX ADMIN — FUROSEMIDE 40 MG: 10 INJECTION, SOLUTION INTRAMUSCULAR; INTRAVENOUS at 00:32

## 2019-01-01 RX ADMIN — HYDROCODONE BITARTRATE AND ACETAMINOPHEN 1 TABLET: 5; 325 TABLET ORAL at 20:54

## 2019-01-01 RX ADMIN — ASPIRIN 81 MG 81 MG: 81 TABLET ORAL at 09:37

## 2019-01-01 RX ADMIN — FENTANYL CITRATE 25 MCG: 50 INJECTION INTRAMUSCULAR; INTRAVENOUS at 03:17

## 2019-01-01 RX ADMIN — FENTANYL CITRATE 25 MCG: 50 INJECTION INTRAMUSCULAR; INTRAVENOUS at 23:53

## 2019-01-01 RX ADMIN — HYDROCODONE BITARTRATE AND ACETAMINOPHEN 1 TABLET: 5; 325 TABLET ORAL at 12:10

## 2019-01-01 RX ADMIN — ASPIRIN 81 MG 81 MG: 81 TABLET ORAL at 08:20

## 2019-01-01 RX ADMIN — HYDROCODONE BITARTRATE AND ACETAMINOPHEN 1 TABLET: 5; 325 TABLET ORAL at 20:50

## 2019-01-01 RX ADMIN — INSULIN LISPRO 2 UNITS: 100 INJECTION, SOLUTION INTRAVENOUS; SUBCUTANEOUS at 22:33

## 2019-01-01 RX ADMIN — METOPROLOL SUCCINATE 100 MG: 100 TABLET, EXTENDED RELEASE ORAL at 12:33

## 2019-01-01 RX ADMIN — SODIUM CHLORIDE, PRESERVATIVE FREE 3 ML: 5 INJECTION INTRAVENOUS at 00:33

## 2019-01-01 RX ADMIN — ACETAMINOPHEN 650 MG: 325 TABLET, FILM COATED ORAL at 00:32

## 2019-01-01 RX ADMIN — IPRATROPIUM BROMIDE AND ALBUTEROL SULFATE 3 ML: 2.5; .5 SOLUTION RESPIRATORY (INHALATION) at 07:28

## 2019-01-01 RX ADMIN — ACETAMINOPHEN 500 MG: 500 TABLET, FILM COATED ORAL at 17:20

## 2019-01-01 RX ADMIN — HYDROCODONE BITARTRATE AND ACETAMINOPHEN 1 TABLET: 5; 325 TABLET ORAL at 05:12

## 2019-01-01 RX ADMIN — METRONIDAZOLE 500 MG: 500 INJECTION, SOLUTION INTRAVENOUS at 17:33

## 2019-01-01 RX ADMIN — LIDOCAINE HYDROCHLORIDE 10 ML: 10 INJECTION, SOLUTION EPIDURAL; INFILTRATION; INTRACAUDAL; PERINEURAL at 23:05

## 2019-01-01 RX ADMIN — FUROSEMIDE 40 MG: 10 INJECTION, SOLUTION INTRAMUSCULAR; INTRAVENOUS at 06:50

## 2019-01-01 RX ADMIN — ACETAMINOPHEN 500 MG: 500 TABLET, FILM COATED ORAL at 04:13

## 2019-01-01 RX ADMIN — GLYCOPYRROLATE 0.4 MG: 0.2 INJECTION, SOLUTION INTRAMUSCULAR; INTRAVENOUS at 12:30

## 2019-01-01 RX ADMIN — IPRATROPIUM BROMIDE AND ALBUTEROL SULFATE 3 ML: 2.5; .5 SOLUTION RESPIRATORY (INHALATION) at 07:09

## 2019-01-01 RX ADMIN — IPRATROPIUM BROMIDE AND ALBUTEROL SULFATE 3 ML: 2.5; .5 SOLUTION RESPIRATORY (INHALATION) at 15:53

## 2019-01-01 RX ADMIN — INSULIN LISPRO 4 UNITS: 100 INJECTION, SOLUTION INTRAVENOUS; SUBCUTANEOUS at 17:20

## 2019-01-01 RX ADMIN — AZTREONAM 2 G: 2 INJECTION, POWDER, LYOPHILIZED, FOR SOLUTION INTRAMUSCULAR; INTRAVENOUS at 23:48

## 2019-01-01 RX ADMIN — HYDROMORPHONE HYDROCHLORIDE 0.25 MG: 1 INJECTION, SOLUTION INTRAMUSCULAR; INTRAVENOUS; SUBCUTANEOUS at 20:35

## 2019-01-01 RX ADMIN — FENTANYL CITRATE 25 MCG: 50 INJECTION INTRAMUSCULAR; INTRAVENOUS at 15:23

## 2019-01-01 RX ADMIN — IPRATROPIUM BROMIDE AND ALBUTEROL SULFATE 3 ML: 2.5; .5 SOLUTION RESPIRATORY (INHALATION) at 15:51

## 2019-01-01 RX ADMIN — AZTREONAM 2 G: 2 INJECTION, POWDER, LYOPHILIZED, FOR SOLUTION INTRAMUSCULAR; INTRAVENOUS at 15:05

## 2019-01-01 RX ADMIN — MULTIPLE VITAMINS W/ MINERALS TAB 1 TABLET: TAB ORAL at 08:41

## 2019-01-17 NOTE — TELEPHONE ENCOUNTER
LANDY CALLED WANTING RESPITE CARE @ [facility] FOR MS LOWE, SHE IS UNABLE TO TAKE CARE OF HERSELF , HER CAREGIVER() IS NOW IN HOSPICE. FAX MED LIST, LAST PHYSICAL,MED HISTORY F [phone] ATTN ELLIE.

## 2019-01-23 NOTE — TELEPHONE ENCOUNTER
----- Message from Maryann Guillaume MD sent at 1/22/2019  9:55 PM EST -----  Can you let her know - no change in dose, recheck in 4 weeks

## 2019-01-23 NOTE — TELEPHONE ENCOUNTER
1 PERSON ASSIST FOR TRANSFERS NEED TO BE MARKED ON MSMirna LOWE  [facility] POINTE ASSESSMENT FORM . PLEASE RESEND

## 2019-02-07 NOTE — TELEPHONE ENCOUNTER
Marylin with home health is calling to obtain therapy orders for patient. She can be reached at [phone]

## 2019-02-08 NOTE — TELEPHONE ENCOUNTER
PT and OT orders for home health.  Need a copy of last office note faxed to [phone].  Is this okay?

## 2019-04-04 PROBLEM — J96.12 ACUTE ON CHRONIC RESPIRATORY ACIDOSIS (HCC): Status: ACTIVE | Noted: 2019-01-01

## 2019-04-04 PROBLEM — Z79.01 CHRONIC ANTICOAGULATION: Status: ACTIVE | Noted: 2019-01-01

## 2019-04-04 PROBLEM — J96.02 ACUTE ON CHRONIC RESPIRATORY ACIDOSIS (HCC): Status: ACTIVE | Noted: 2019-01-01

## 2019-04-04 PROBLEM — J96.21 ACUTE ON CHRONIC RESPIRATORY FAILURE WITH HYPOXIA AND HYPERCAPNIA (HCC): Status: ACTIVE | Noted: 2019-01-01

## 2019-04-04 PROBLEM — J96.22 ACUTE ON CHRONIC RESPIRATORY FAILURE WITH HYPOXIA AND HYPERCAPNIA (HCC): Status: ACTIVE | Noted: 2019-01-01

## 2019-04-04 PROBLEM — J93.9 PNEUMOTHORAX ON RIGHT: Status: ACTIVE | Noted: 2019-01-01

## 2019-04-05 NOTE — PROGRESS NOTES
"Pharmacy Consult-Vancomycin Dosing  Kristin Magdaleno is a  84 y.o. female receiving vancomycin therapy.     Indication: PNA  Consulting Provider: Intensivist  ID Consult: No    Goal Trough: 15-20    Current Antimicrobial Therapy  Anti-Infectives (From admission, onward)      Ordered     Dose/Rate Route Frequency Start Stop    04/05/19 0934  metroNIDAZOLE (FLAGYL) IVPB 500 mg     Ordering Provider:  Magdi Wood MD    500 mg  over 60 Minutes Intravenous Every 8 Hours 04/05/19 1030 04/12/19 1029    04/04/19 2226  aztreonam (AZACTAM) 2 g in sodium chloride 0.9 % 100 mL IVPB-MBP     Ordering Provider:  Uri Poole APRN    2 g  over 4 Hours Intravenous Every 8 Hours 04/05/19 0600 04/12/19 0559    04/04/19 2226  doxycycline (MONODOX) capsule 100 mg     Ordering Provider:  Uri Poole APRN    100 mg Oral Every 12 Hours Scheduled 04/04/19 2228 04/14/19 2059 04/04/19 2215  aztreonam (AZACTAM) 2 g in sodium chloride 0.9 % 100 mL IVPB-MBP     Ordering Provider:  Kash Gee DO    2 g  200 mL/hr over 30 Minutes Intravenous Once 04/04/19 2217 04/04/19 2309 04/04/19 2215  vancomycin (VANCOCIN) in iso-osmotic dextrose IVPB 1 g (premix) 200 mL     Ordering Provider:  Kash Gee DO    20 mg/kg × 49.9 kg  over 60 Minutes Intravenous Once 04/04/19 2217 04/05/19 0132            Allergies  Allergies as of 04/04/2019 - Reviewed 04/04/2019   Allergen Reaction Noted   • Cephalosporins  07/20/2016   • Morphine and related  07/20/2016   • Penicillins  07/20/2016       Labs    Results from last 7 days   Lab Units 04/04/19  2128   BUN mg/dL 25*   CREATININE mg/dL 1.15       Results from last 7 days   Lab Units 04/05/19  0409 04/04/19 2128   WBC 10*3/mm3 17.83* 14.79*       Evaluation of Dosing     Last Dose Received in the ED/Outside Facility: 0032 4/5  Is Patient on Dialysis or Renal Replacement: No    Ht - 177.8 cm (70\")  Wt - 49.9 kg (110 lb)    Estimated Creatinine Clearance: 29.7 mL/min (by C-G " formula based on SCr of 1.15 mg/dL).    Intake & Output (last 3 days)         04/02 0701 - 04/03 0700 04/03 0701 - 04/04 0700 04/04 0701 - 04/05 0700 04/05 0701 - 04/06 0700    I.V. (mL/kg)   367.3 (7.4)     Total Intake(mL/kg)   367.3 (7.4)     Urine (mL/kg/hr)   650     Total Output   650     Net   -282.7                     Microbiology and Radiology  Microbiology Results (last 10 days)       Procedure Component Value - Date/Time    Blood Culture - Blood, Arm, Right [[id number]] Collected:  04/04/19 2150    Lab Status:  Preliminary result Specimen:  Blood from Arm, Right Updated:  04/05/19 1000     Blood Culture No growth at less than 24 hours    Blood Culture - Blood, Arm, Left [[id number]] Collected:  04/04/19 2140    Lab Status:  Preliminary result Specimen:  Blood from Arm, Left Updated:  04/05/19 1000     Blood Culture No growth at less than 24 hours            Evaluation of Level    No results found for: [place] VANCORANDOM    Assessment/Plan:    Patient received 20 mg/kg loading dose at 00:32 this AM.  We will give a dose of 15 mg/kg tomorrow at 0600, and then draw a level with am labs on 4/7 to assess clearance. Will continue to follow cultures, renal function, and patient status closely.     Thank you,  Raad Cook, PharmD, BCPS

## 2019-04-05 NOTE — PROGRESS NOTES
Discharge Planning Assessment  [facility]     Patient Name: Kristin Magdaleno  MRN: [id number]  Today's Date: 4/5/2019    Admit Date: 4/4/2019    Discharge Needs Assessment     Row Name 04/05/19 1423       Living Environment    Lives With  facility resident    Name(s) of Who Lives With Patient  Resides at [facility] on [address].     Current Living Arrangements  independent/assisted living facility    Primary Care Provided by  other (see comments)    Provides Primary Care For  no one    Family Caregiver if Needed  child(ni), adult    Quality of Family Relationships  supportive    Able to Return to Prior Arrangements  yes       Resource/Environmental Concerns    Resource/Environmental Concerns  none    Transportation Concerns  other (see comments)       Transition Planning    Patient/Family Anticipates Transition to  long term care facility    Patient/Family Anticipated Services at Transition  skilled nursing;rehabilitation services    Transportation Anticipated  family or friend will provide       Discharge Needs Assessment    Readmission Within the Last 30 Days  no previous admission in last 30 days    Concerns to be Addressed  adjustment to diagnosis/illness;basic needs    Equipment Currently Used at Home  commode;grab bar;wheelchair;shower chair        Discharge Plan     Row Name 04/05/19 142       Plan    Plan  Rehab    Patient/Family in Agreement with Plan  yes    Plan Comments  Talked to Ms. Magdaleno and her daughter at . She has been a resident at [facility] on [address].  She gets PT.OT there.  She stays in a w/c most of the time.  She is able to transfer to [facility] alone.  She uses a w/c, BSC, grab bars, shower chair and a brace there.  She sees the MD that oversees the patients at [facility] for her medical needs.  She gets her Rx filled at [facility] on [address].  Her insurance covers cost of the meds.  She is a private pay patient at [facility].  The  goal is to return to [facility] if possible and continue to receive PT.OT and other services.  CM will follow for needs.      Final Discharge Disposition Code  04 - intermediate care facility        Destination      No service coordination in this encounter.      Durable Medical Equipment      No service coordination in this encounter.      Dialysis/Infusion      No service coordination in this encounter.      Home Medical Care      No service coordination in this encounter.      Therapy      No service coordination in this encounter.      Community Resources      No service coordination in this encounter.          Demographic Summary     Row Name 04/05/19 1422       General Information    General Information Comments  She sees the MD that covers [facility] for primary care needs.         Functional Status     Row Name 04/05/19 1423       Functional Status    Usual Activity Tolerance  fair    Current Activity Tolerance  poor       Functional Status, IADL    Medications  assistive person    Meal Preparation  assistive person    Housekeeping  assistive person    Laundry  assistive person    Shopping  assistive person       Mental Status Summary    Recent Changes in Mental Status/Cognitive Functioning  unable to assess       Employment/    Employment Status  retired        Psychosocial    No documentation.       Abuse/Neglect    No documentation.       Legal    No documentation.       Substance Abuse    No documentation.       Patient Forms    No documentation.           Jessica Joy RN

## 2019-04-05 NOTE — PLAN OF CARE
Problem: Fall Risk (Adult)  Goal: Identify Related Risk Factors and Signs and Symptoms  Outcome: Ongoing (interventions implemented as appropriate)    Goal: Absence of Fall  Outcome: Ongoing (interventions implemented as appropriate)      Problem: Skin Injury Risk (Adult)  Goal: Identify Related Risk Factors and Signs and Symptoms  Outcome: Ongoing (interventions implemented as appropriate)    Goal: Skin Health and Integrity  Outcome: Ongoing (interventions implemented as appropriate)      Problem: Patient Care Overview  Goal: Plan of Care Review  Outcome: Ongoing (interventions implemented as appropriate)   04/05/19 0607   OTHER   Outcome Summary Patient alert and oriented. Put on HFNC once on the unit, tolerating well. C/o generalized pain, relieved with prn medication. No signs of furthering distress. VSS, will continue to monitor.      Goal: Individualization and Mutuality  Outcome: Ongoing (interventions implemented as appropriate)    Goal: Discharge Needs Assessment  Outcome: Ongoing (interventions implemented as appropriate)    Goal: Interprofessional Rounds/Family Conf  Outcome: Ongoing (interventions implemented as appropriate)

## 2019-04-05 NOTE — PLAN OF CARE
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 Patient has skin tears from fall-may use Venelex to top of head skin tear. Dolphin bed with low air loss topper ordered via [employer] ([phone]) for patient comfort. See skin care orders and LDA's. All interventions in place and implemented. WOC will continue to follow.    Coping/Psychosocial   Plan of Care Reviewed With patient   Coping/Psychosocial   Patient Agreement with Plan of Care agrees   Plan of Care Review   Progress no change

## 2019-04-05 NOTE — THERAPY EVALUATION
Acute Care - Occupational Therapy Initial Evaluation  [facility]     Patient Name: Kristin Magdaleno  : 1935  MRN: [id number]  Today's Date: 2019  Onset of Illness/Injury or Date of Surgery: 19  Date of Referral to OT: 19  Referring Physician: MD Israel    Admit Date: 2019       ICD-10-CM ICD-9-CM   1. Acute on chronic respiratory failure with hypoxia and hypercapnia (CMS/HCC) J96.21 518.84    J96.22 786.09     799.02   2. Pneumothorax on right J93.9 512.89   3. Opacity of lung on imaging study R91.8 793.19   4. Injury of head, initial encounter S09.90XA 959.01   5. Abrasion of scalp, initial encounter S00.01XA 910.0   6. Anticoagulated by anticoagulation treatment Z79.01 V58.61   7. History of atrial fibrillation Z86.79 V12.59   8. Impaired mobility and ADLs Z74.09 799.89     Patient Active Problem List   Diagnosis   • Essential hypertension, benign   • Pure hypercholesterolemia   • Diabetes mellitus without complication (CMS/HCC)   • Coronary atherosclerosis   • CVA (cerebral vascular accident) (CMS/HCC)   • Atrial fibrillation; on Coumadin, Digoxin, Metoprolol   • Rheumatic heart disease   • Multiple closed fractures of ribs of left side   • Heart murmur   • Subcutaneous emphysema (CMS/HCC)   • Contusion of left lower extremity   • Renal cyst   • Lower extremity edema   • Chronic anticoagulation   • Traumatic Pneumothorax on right   • Acute on chronic mixed respiratory acidosis   • Acute on chronic respiratory failure with hypoxia and hypercapnia (CMS/HCC)     Past Medical History:   Diagnosis Date   • Atrial fibrillation (CMS/HCC)    • CAD (coronary artery disease)     s/p cabg   • Chronic anticoagulation    • Diabetes mellitus (CMS/HCC)     type II    • H/O hemiparesis     Rt sided    • Heart murmur     RHF as child.  no recent echos (pt declines)   • Hyperlipidemia    • Hypertension    • Hypothyroid    • Rheumatic heart disease     as a child, heart murmur, no recent echos (pt.  declines)   • Stroke (CMS/HCC)     20+ years ago in OR during CABG      Past Surgical History:   Procedure Laterality Date   • APPENDECTOMY     • BREAST SURGERY      no breast tissue, never had tara, mel. breast implants   • CORONARY ARTERY BYPASS GRAFT  1994    CVA  and a fib post-op - on coumadin since   • TOTAL HIP ARTHROPLASTY REVISION Right 1998          OT ASSESSMENT FLOWSHEET (last 12 hours)      Occupational Therapy Evaluation     Row Name 04/05/19 143                   OT Evaluation Time/Intention    Subjective Information  complains of;weakness;fatigue  -CL        Document Type  evaluation  -CL        Mode of Treatment  occupational therapy  -CL        Patient Effort  good  -CL        Symptoms Noted During/After Treatment  fatigue  -CL           General Information    Patient Profile Reviewed?  yes  -CL        Onset of Illness/Injury or Date of Surgery  04/04/19  -CL        Referring Physician  MD Israel  -CL        Prior Level of Function  independent:;transfer;all household mobility;mod assist:;max assist:;ADL's;dressing;bathing w/c for mobility  -CL        Equipment Currently Used at Home  bath bench;wheelchair  -CL        Pertinent History of Current Functional Problem  Pt presented to the ED s/p fall. Pt found to have hypoxic resp failure w/ small R lung infiltrate w/ possible PNA.   -CL        Existing Precautions/Restrictions  fall;oxygen therapy device and L/min;other (see comments)  (Significant)  remote CVA w/ residual R deficits, blind  -CL        Limitations/Impairments  safety/cognitive  -CL        Risks Reviewed  patient and family:;LOB;nausea/vomiting;increased discomfort;dizziness;change in vital signs;lines disloged  -CL        Benefits Reviewed  patient and family:;improve function;increase independence;increase strength;increase balance;decrease pain;increase knowledge  -CL        Barriers to Rehab  previous functional deficit;physical barrier  -CL           Relationship/Environment     Lives With  facility resident  -CL           Resource/Environmental Concerns    Current Living Arrangements  independent/assisted living facility  -CL           Cognitive Assessment/Interventions    Additional Documentation  Cognitive Assessment/Intervention (Group)  -CL           Cognitive Assessment/Intervention- PT/OT    Affect/Mental Status (Cognitive)  WFL  -CL        Orientation Status (Cognition)  oriented x 3  -CL        Follows Commands (Cognition)  follows one step commands;over 90% accuracy;repetition of directions required;verbal cues/prompting required  -CL        Cognitive Function (Cognitive)  safety deficit  -CL        Safety Deficit (Cognitive)  mild deficit;awareness of need for assistance;safety precautions awareness  -CL        Personal Safety Interventions  fall prevention program maintained;nonskid shoes/slippers when out of bed;supervised activity  -CL           Bed Mobility Assessment/Treatment    Bed Mobility Assessment/Treatment  supine-sit;sit-supine  -CL        Supine-Sit Placedo (Bed Mobility)  maximum assist (25% patient effort);2 person assist;verbal cues  -CL        Sit-Supine [place]acedo (Bed Mobility)  maximum assist (25% patient effort);2 person assist;verbal cues  -CL        Assistive Device (Bed Mobility)  bed rails;draw sheet;head of bed elevated  -CL           Functional Mobility    Functional Mobility- Ind. Level  not appropriate to assess  -CL           Transfer Assessment/Treatment    Comment (Transfers)  Pt declined.   -CL           General ROM    GENERAL ROM COMMENTS  RUE shldr F ~60 deg, elbow F lacking 50%, grasp limited by ~50%. LUE grossly WFL.   -CL           MMT (Manual Muscle Testing)    General MMT Comments  RUE grossly 0/5. LUE grossly 3+/5.   -CL           Motor Assessment/Interventions    Additional Documentation  Balance (Group)  -CL           Balance    Balance  static sitting balance  -CL           Static Sitting Balance    Level of Placedo  (Unsupported Sitting, Static Balance)  maximal assist, 25 to 49% patient effort  -CL        Sitting Position (Unsupported Sitting, Static Balance)  sitting on edge of bed  -CL        Comment (Unsupported Sitting, Static Balance)  Pt w/ initial R lateral and posterior LOB, progressed to brief moments of CGA.   -CL           Sensory Assessment/Intervention    Sensory General Assessment  light touch sensation deficits identified  -CL        Additional Documentation  Vision Assessment/Intervention (Group)  -CL           Light Touch Sensation Assessment    Left Upper Extremity: Light Touch Sensation Assessment  intact  -CL        Right Upper Extremity: Light Touch Sensation Assessment  severe impairment, less than 50% correct responses  -CL           Vision Assessment/Intervention    Visual Impairment/Limitations  legally blind  -CL           Positioning and Restraints    Pre-Treatment Position  in bed  -CL        Post Treatment Position  bed  -CL        In Bed  notified nsg;fowlers;side lying left;call light within reach;encouraged to call for assist;with family/caregiver;RUE elevated;LUE elevated;pillow between legs;waffle boots/both  -CL           Pain Assessment    Additional Documentation  Pain Scale 2: FACES Pre/Post-Treatment (Group)  -CL           Pain Scale 2: FACES Pre/Post-Treatment    Pain 2: FACES Scale, Pretreatment  4-->hurts little more  -CL        Pain 2: FACES Scale, Post-Treatment  4-->hurts little more  -CL        Pain Location 2 - Side  Right  -CL        Pain Location 2  -- rib cage  -CL        Pre/Post Treatment Pain 2 Comment  Tolerated.   -CL        Pain Intervention(s) 2  Repositioned;Ambulation/increased activity  -CL           [REMOVED] Wound 04/05/19 0030 Right upper gluteal pressure injury    Wound - Properties Group Date first assessed: 04/05/19  - Time first assessed: 0030  -LH Side: Right  -LH Orientation: upper  -LH Location: gluteal  -LH Type: pressure injury  -LH Stage, Pressure  Injury: Stage 2  -LH Resolution Date: 04/05/19  -MR, reclassified  Resolution Time: 0836  -MR       Wound 04/05/19 0030 Right medial gluteal    Wound - Properties Group Date first assessed: 04/05/19  -MR Time first assessed: 0030  -MR Present On Admission : yes  -MR Side: Right  -MR Orientation: medial  -MR Location: gluteal  -MR Stage, Pressure Injury: Stage 3  -MR       Clinical Impression (OT)    Date of Referral to OT  04/05/19  -CL        OT Diagnosis  Decreased independence in ADLs.  -CL        Patient/Family Goals Statement (OT Eval)  Return to PLOF.   -CL        Criteria for Skilled Therapeutic Interventions Met (OT Eval)  yes;treatment indicated  -CL        Rehab Potential (OT Eval)  good, to achieve stated therapy goals  -CL        Therapy Frequency (OT Eval)  daily  -CL        Anticipated Equipment Needs at Discharge (OT)  -- TBA further  -CL        Anticipated Discharge Disposition (OT)  skilled nursing facility  -CL           Vital Signs    Pre Systolic BP Rehab  94  -CL        Pre Treatment Diastolic BP  56  -CL        Post Systolic BP Rehab  112  -CL        Post Treatment Diastolic BP  54  -CL        Pretreatment Heart Rate (beats/min)  84  -CL        Posttreatment Heart Rate (beats/min)  79  -CL        Pre SpO2 (%)  100  -CL        O2 Delivery Pre Treatment  supplemental O2  -CL        Post SpO2 (%)  99  -CL        O2 Delivery Post Treatment  supplemental O2  -CL        Pre Patient Position  Supine  -CL        Intra Patient Position  Sitting  -CL        Post Patient Position  Supine  -CL           OT Goals    Transfer Goal Selection (OT)  transfer, OT goal 1  -CL        Grooming Goal Selection (OT)  grooming, OT goal 1  -CL        Balance Goal Selection (OT)  balance, OT goal 1  -CL        Additional Documentation  Balance Goal Selection (OT) (Row);Grooming Goal Selection (OT) (Row)  -CL           Transfer Goal 1 (OT)    Activity/Assistive Device (Transfer Goal 1, OT)  bed-to-chair/chair-to-bed  -CL         Menard Level/Cues Needed (Transfer Goal 1, OT)  moderate assist (50-74% patient effort);verbal cues required  -CL        Time Frame (Transfer Goal 1, OT)  by discharge;long term goal (LTG)  -CL        Progress/Outcome (Transfer Goal 1, OT)  goal ongoing  -CL           Grooming Goal 1 (OT)    Activity/Device (Grooming Goal 1, OT)  wash face, hands sitting EOB  -CL        Menard (Grooming Goal 1, OT)  minimum assist (75% or more patient effort);verbal cues required  -CL        Time Frame (Grooming Goal 1, OT)  by discharge;long term goal (LTG)  -CL        Progress/Outcome (Grooming Goal 1, OT)  goal ongoing  -CL           Balance Goal 1 (OT)    Activity/Assistive Device (Balance Goal 1, OT)  sitting, static  -CL        Menard Level/Cues Needed (Balance Goal 1, OT)  standby assist;verbal cues required  -CL        Time Frame (Balance Goal 1, OT)  by discharge;long term goal (LTG)  -CL        Progress/Outcomes (Balance Goal 1, OT)  goal ongoing  -CL          User Key  (r) = Recorded By, (t) = Taken By, (c) = Cosigned By    Initials Name Effective Dates    Julia Novoa, RN 06/16/16 -     CL Regina Barnhart, GENO 04/03/18 -     LH Kari Pickett RN 02/14/18 -          Occupational Therapy Education     Title: PT OT SLP Therapies (In Progress)     Topic: Occupational Therapy (In Progress)     Point: ADL training (In Progress)     Description: Instruct learner(s) on proper safety adaptation and remediation techniques during self care or transfers.   Instruct in proper use of assistive devices.    Learning Progress Summary           Patient Acceptance, E, NR by CL at 4/5/2019  2:33 PM    Comment:  Pt educated on appropriate safeyt precautions, role of OT, positioning, and benefits of therapy.   Family Acceptance, E, NR by CL at 4/5/2019  2:33 PM    Comment:  Pt educated on appropriate safeyt precautions, role of OT, positioning, and benefits of therapy.                   Point: Precautions (In  Progress)     Description: Instruct learner(s) on prescribed precautions during self-care and functional transfers.    Learning Progress Summary           Patient Acceptance, E, NR by CL at 4/5/2019  2:33 PM    Comment:  Pt educated on appropriate safeyt precautions, role of OT, positioning, and benefits of therapy.   Family Acceptance, E, NR by CL at 4/5/2019  2:33 PM    Comment:  Pt educated on appropriate safeyt precautions, role of OT, positioning, and benefits of therapy.                   Point: Body mechanics (In Progress)     Description: Instruct learner(s) on proper positioning and spine alignment during self-care, functional mobility activities and/or exercises.    Learning Progress Summary           Patient Acceptance, E, NR by CL at 4/5/2019  2:33 PM    Comment:  Pt educated on appropriate safeyt precautions, role of OT, positioning, and benefits of therapy.   Family Acceptance, E, NR by CL at 4/5/2019  2:33 PM    Comment:  Pt educated on appropriate safeyt precautions, role of OT, positioning, and benefits of therapy.                               User Key     Initials Effective Dates Name Provider Type Discipline     04/03/18 -  Regina Barnhart, OT Occupational Therapist OT                  OT Recommendation and Plan  Outcome Summary/Treatment Plan (OT)  Anticipated Equipment Needs at Discharge (OT): (TBA further)  Anticipated Discharge Disposition (OT): skilled nursing facility  Therapy Frequency (OT Eval): daily  Plan of Care Review  Plan of Care Reviewed With: patient, family  Plan of Care Reviewed With: patient, family  Outcome Summary: OT completed a brief chart review. Pt Max Ax2 for bed mobility and Max A for static sitting balance. Pt limited d/t significant generalized weakness and fatigue. Recommend cont skilled IPOT POC. Recommend pt DC to SNF.     Outcome Measures     Row Name 04/05/19 4774             How much help from another is currently needed...    Putting on and taking off regular  lower body clothing?  1  -CL      Bathing (including washing, rinsing, and drying)  1  -CL      Toileting (which includes using toilet bed pan or urinal)  1  -CL      Putting on and taking off regular upper body clothing  2  -CL      Taking care of personal grooming (such as brushing teeth)  2  -CL      Eating meals  2  -CL      Score  9  -CL         Functional Assessment    Outcome Measure Options  AM-PAC 6 Clicks Daily Activity (OT)  -CL        User Key  (r) = Recorded By, (t) = Taken By, (c) = Cosigned By    Initials Name Provider Type    Regina Westbrook OT Occupational Therapist          Time Calculation:   Time Calculation- OT     Row Name 04/05/19 1433             Time Calculation- OT    OT Start Time  1433  -CL      OT Received On  04/05/19  -CL      OT Goal Re-Cert Due Date  04/15/19  -CL        User Key  (r) = Recorded By, (t) = Taken By, (c) = Cosigned By    Initials Name Provider Type    Regina Westbrook OT Occupational Therapist        Therapy Charges for Today     Code Description Service Date Service Provider Modifiers Qty    [id number]  OT EVAL LOW COMPLEXITY 4 4/5/2019 Regina Barnhart OT GO 1               Regina Barnhart OT  4/5/2019

## 2019-04-05 NOTE — PROGRESS NOTES
INTENSIVIST / PULMONARY FOLLOW UP NOTE     Hospital:  LOS: 2 days   Ms. Kristin Magdaleno, 84 y.o. 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  Wheelchair-bound    Atrial fibrillation; on Coumadin, Digoxin, Metoprolol    History of rheumatic heart disease. Data deficit. No echoes    Essential hypertension, benign    Pure hypercholesterolemia    Diabetes mellitus without complication (CMS/HCC)    Coronary atherosclerosis          SUBJECTIVE   Stable on HFNC    The patient's relevant past medical, surgical, family, and social history were reviewed    Allergies and medications were reviewed    ROS:  Per subjective, all other systems were reviewed and were negative        OBJECTIVE     Vital Sign Min/Max for last 24 hours:  Temp  Min: 97.7 °F (36.5 °C)  Max: 98.8 °F (37.1 °C)   BP  Min: 91/45  Max: 139/74   Pulse  Min: 86  Max: 134   Resp  Min: 18  Max: 22   SpO2  Min: 88 %  Max: 100 %   No Data Recorded     Physical Exam:  General Appearance:  Conversant, in no acute distress  Eyes:  No scleral icterus or pallor, pupils normal  Ears, Nose, Mouth, Throat:  Atraumatic, oropharynx clear  Neck:  Trachea midline, thyroid normal  Respiratory:  Clear to auscultation bilaterally, normal effort, no tenderness to palpation  Cardiovascular:  Regular rate and rhythm, no murmurs, no peripheral edema, no thrill  Gastrointestinal:  Soft, non-tender, non-distended, no hepatosplenomegaly  Skin:  Normal temperature, no rash  Psychiatric:  Alert and oriented x 3, normal judgement and insight  Neuro:  No new focal neurologic deficits observed    Telemetry:              Hemodynamics:   CVP:     PAP:     PAOP:     CO:     CI:     SVI:     SVR:        SpO2: 97 % SpO2  Min: 88 %  Max: 100 %   Device:      Flow Rate:   No Data Recorded     Mechanical Ventilator Settings:            Resp Rate (Set): 10 Resp Rate (Observed) Vent: (P) 25   FiO2 (%): 60 %           Minute Ventilation (L/min) (Obs): 18.1 L/min          Intake/Ouptut 24 hrs (7:00AM - 6:59 AM)  Intake & Output (last 3 days)       04/04 0701 - 04/05 0700 04/05 0701 - 04/06 0700 04/06 0701 - 04/07 0700    I.V. (mL/kg) 367.3 (7.4) 310.6 (6.2) 0.7 (0)    IV Piggyback  200 164.4    Total Intake(mL/kg) 367.3 (7.4) 510.6 (10.2) 165.1 (3.3)    Urine (mL/kg/hr) 650 800 (0.7) 800 (1.1)    Total Output 650 800 800    Net -282.7 -289.4 -634.9                 Lines, Drains & Airways    Active LDAs     Name:   Placement date:   Placement time:   Site:   Days:    Peripheral IV 04/04/19 2133 Left Antecubital   04/04/19 2133    Antecubital   less than 1    External Urinary Catheter   04/05/19    0030    --   less than 1                Hematology:  Results from last 7 days   Lab Units 04/06/19 0543 04/05/19 0409 04/04/19 2128   WBC 10*3/mm3 13.26* 17.83* 14.79*   HEMOGLOBIN g/dL 10.4* 12.4 12.6   HEMATOCRIT % 33.0* 40.8 41.4   PLATELETS 10*3/mm3 254 295 351     Electrolytes, Magnesium and Phosphorus:  Results from last 7 days   Lab Units 04/06/19 0543 04/05/19 0409 04/04/19 2128   SODIUM mmol/L 139  --  141   CHLORIDE mmol/L 107  --  105   POTASSIUM mmol/L 3.9  --  4.6   CO2 mmol/L 24.0  --  23.0   MAGNESIUM mg/dL 2.1 1.7  --    PHOSPHORUS mg/dL 3.6 4.8  --      Renal:  Results from last 7 days   Lab Units 04/06/19  0543 04/04/19  2128   CREATININE mg/dL 0.90 1.15   BUN mg/dL 28* 25*     Estimated Creatinine Clearance: 38 mL/min (by C-G formula based on SCr of 0.9 mg/dL).  Hepatic:  Results from last 7 days   Lab Units 04/06/19  0543 04/04/19 2128   ALK PHOS U/L 58 75   BILIRUBIN mg/dL 0.5 0.3   ALT (SGPT) U/L 62* 96*   AST (SGOT) U/L 152* 95*     Arterial Blood Gases:  Results from last 7 days   Lab Units  04/05/19  0136 04/04/19  2143   PH, ARTERIAL pH units 7.259* 7.255*   PCO2, ARTERIAL mm Hg 60.6* 54.2*   PO2 ART mm Hg 63.6* 93.6   FIO2 % 55 60       Results from last 7 days   Lab Units 04/05/19  0409   HEMOGLOBIN A1C % 6.40*       No results found for: LACTATE    Relevant imaging studies and labs from 04/06/19 were reviewed and interpreted by me    Medications (drips):         aspirin 81 mg Oral Daily   atorvastatin 40 mg Oral Nightly   aztreonam 2 g Intravenous Q8H   castor oil-balsam peru  Topical Q12H   digoxin 250 mcg Oral Daily   enoxaparin 30 mg Subcutaneous Q24H   insulin lispro 0-9 Units Subcutaneous 4x Daily With Meals & Nightly   ipratropium-albuterol 3 mL Nebulization 4x Daily - RT   metoprolol succinate  mg Oral Daily   metroNIDAZOLE 500 mg Intravenous Q8H   multivitamin with minerals 1 tablet Oral Daily   polyethylene glycol 17 g Oral BID   sodium chloride 3 mL Intravenous Q12H       Assessment/Plan   IMPRESSION / PLAN     Inpatient Problem List:  84 y.o.female:  Active Hospital Problems    Diagnosis   • **Acute on chronic hypoxemic/hypercarbic respiratory failure (CMS/HCC)   • Small right traumatic pneumothorax due to fall and multiple right rib fractures   • Severe cervical kyphosis presumably due to osteoporosis and compression fractures   • Failure to thrive.  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 Data deficit. No echoes     as a child, heart murmur, no recent echos (pt. declines)     • Atrial fibrillation; on Coumadin, Digoxin, Metoprolol   • Diabetes mellitus without complication (CMS/HCC)     Type 2 or unspecified, not stated as uncontrolled.     • Essential hypertension, benign   • Pure hypercholesterolemia   • Coronary atherosclerosis        Impression:  84 y.o.female with relevant PMH of CAD (s/p CABG), CVA w/ R hemiparesis, AF (on Coumadin/digoxin/BB), and DM who is a  resident at [facility], assisted living who suffered a fall while trying to get off the toilet.  Afterwards she had issues w/ tachypnea, dyspnea,  and pleuritic CP. EMS was contacted who noted that the patient was hypoxic upon their arrival w/ spO2 in 60s.  Patient was transported to our ED on 4/5 where workup revealed a small R apical PTX and lung infiltrate concerning for pneumonia vs pulmonary contusions.    Plan:  Hypoxemic Respiratory Failure - wean O2 as tolerated.  On HFNC.  BS abx.  F/u cultures and MRSA PCR.  Could also have element of pulmonary contusion.  Rib fractures noted.  Monitor PTX which is extremely small.    Afib - on coumadin as outpatient, currently in NSR.  Stroke was > 20 years ago.  At this point risk of ongoing AC probably outweighs benefit.  Recommend ASA only going forward.    DM - titrate SQ insulin while inpatient, A1c 6.4, I would recommend against any oral hypoglycemics as outpatient.  Unable to tolerate metformin in past.    Supportive care, monitor for complications    DVT prophylaxis    Nutrition - Diet Regular; Consistent Carbohydrate     AND / DNI    Plan of care and goals reviewed with mulitdisciplinary team at daily rounds    Critical Care time spent in direct patient care: 30 minutes (excluding procedure time, if applicable) including high complexity decision making to assess, manipulate, and support vital organ system failure in this individual who has impairment of one or more vital organ systems such that there is a high probability of imminent or life threatening deterioration in the patient’s condition.       Magdi Wood MD  Intensive Care Medicine  04/06/19 9:39 PM

## 2019-04-05 NOTE — PROGRESS NOTES
Adult Nutrition  Assessment/PES    Patient Name:  Kristin Magdaleno  YOB: 1935  MRN: [id number]  Admit Date:  4/4/2019    Assessment Date:  4/5/2019    Comments:  Pt adm w/ low BMI family reports eating well and pt has weight since Jan. Supplement provided w/ meals ; RD will monior intake closely.    Reason for Assessment     Row Name 04/05/19 1638          Reason for Assessment    Reason For Assessment  per organizational policy MDR; admission assessment ; 30 mins     Diagnosis  pulmonary disease pt adm w/ A/C resp failure; recent fall w/ pts, rib fractures and poss pulm contusiion Hx: RHD,CHD,HTN,HLP,PAF, DM-T2     Identified At Risk by Screening Criteria  BMI         Nutrition/Diet History     Row Name 04/05/19 1640          Nutrition/Diet History    Typical Food/Fluid Intake  RN reports pt fell at nursing home and was brought; pt tolerating HFNC; she is a DNI, DNR, AND, wheel chair bound at NH     Food Preferences  Family reports pt doing better since Jan w/ 10-12 lb wt gain;  they  state she has always been thin. They have dentures for pt to use while here.     Supplemental Drinks/Foods/Additives  likes Boost            Labs/Tests/Procedures/Meds     Row Name 04/05/19 1647          Labs/Procedures/Meds    Lab Results Reviewed  reviewed, pertinent        Diagnostic Tests/Procedures    Diagnostic Test/Procedure Reviewed  reviewed, pertinent        Medications    Pertinent Medications Reviewed  reviewed, pertinent     Pertinent Medications Comments  warfarin         Physical Findings     Row Name 04/05/19 1648          Physical Findings    Overall Physical Appearance  generalized wasting;underweight;on oxygen therapy           Nutrition Prescription Ordered     Row Name 04/05/19 1649          Nutrition Prescription PO    Current PO Diet  Regular     Fluid Consistency  Thin     Supplement  Boost Glucose Control (Glucerna Shake)     Supplement Frequency  3 times a day     Common Modifiers  Consistent  Carbohydrate                 Problem/Interventions:  Problem 1     Row Name 04/05/19 1650          Nutrition Diagnoses Problem 1    Problem 1  Underweight     Etiology (related to)  Medical Diagnosis     Pulmonary/Critical Care  A/C respiratory failure     Signs/Symptoms (evidenced by)  BMI     BMI  Less than 16                 Intervention Goal     Row Name 04/05/19 1652          Intervention Goal    General  Nutrition support treatment;Meet nutritional needs for age/condition     PO  Establish PO;Tolerate PO         Nutrition Intervention     Row Name 04/05/19 1652          Nutrition Intervention    RD/Tech Action  Advise alternate selection;Interview for preference;Menu provided;Recommend/ordered;Follow Tx progress;Care plan reviewd     Recommended/Ordered  Supplement         Nutrition Prescription     Row Name 04/05/19 1652          Nutrition Prescription PO    PO Prescription  Begin/change supplement     Supplement  Boost Glucose Control     Supplement Frequency  3 times a day     New PO Prescription Ordered?  Yes         Education/Evaluation     Row Name 04/05/19 1652          Monitor/Evaluation    Monitor  Per protocol;PO intake;Symptoms           Electronically signed by:  Claudette Flores MS,RD,LD  04/05/19 4:53 PM

## 2019-04-05 NOTE — PLAN OF CARE
Problem: Patient Care Overview  Goal: Plan of Care Review  Outcome: Ongoing (interventions implemented as appropriate)   04/05/19 9555   OTHER   Outcome Summary OT completed a brief chart review. Pt Max Ax2 for bed mobility and Max A for static sitting balance. Pt limited d/t significant generalized weakness and fatigue. Recommend cont skilled IPOT POC. Recommend pt DC to SNF.    Coping/Psychosocial   Plan of Care Reviewed With patient;family   Coping/Psychosocial   Patient Agreement with Plan of Care agrees

## 2019-04-05 NOTE — ED PROVIDER NOTES
Virgie Magdaleno is an 84 y.o. female who presents to the ED status post fall which occurred just prior to arrival this evening. According to EMS the patient is a resident at [facility] and this evening when attempting to get up from the toilet she fell forward, striking the front of her scalp against the floor and sustaining several abrasions to the area. Fortunately, the patient denies this occurrence being characteristic of a syncopal episode and further denies any loss of consciousness. EMS state that upon their arrival and in route to the ED the patient has remained alert, despite displaying symptoms of diaphoresis, tachypnea, and significant shortness of breath. During her examination the patient is able to answer questions regarding the incident and when interrogated about her pain she identifies her only discomfort to be accompanied with respiration. She states that prior to her fall she was not short of breath at all and family back this statement noting that during a visit this afternoon around 1700 the patient appeared at baseline. Now relatives emphasize that her breathing is clearly labored from her norm but deny any PMHx of CHF or pulmonary edema.     The patient denies chest pain, headache, nausea, abdominal pain, or any other acute complaints at this time. In regards to her broader PMHx, the patient's relatives identify chronic right-sided deficits suffered from a CVA 25 years prior which occurred while the patient was receiving a CABG. Secondary to this incident the patient is chronically anticoagulated via Warfarin. PMHx of DM is mentioned with the patient's most recent BG reading of 350 and collected in route to the ED. Finally, family acknowledge that the patient has a DNR in place for emergent resuscitation.         History provided by:  Patient, relative and EMS personnel  Fall   Mechanism of injury: fall    Injury location:  Head/neck  Head/neck injury location:   Scalp  Incident location:  Nursing home  Arrived directly from scene: yes    Fall:     Fall occurred: Fell forward off toilet when attempting to get up from using the restroom.    Impact surface:  Hard floor    Point of impact:  Head    Entrapped after fall: no    Protective equipment: none    Suspicion of alcohol use: no    Suspicion of drug use: no    Prior to arrival data:     Loss of consciousness: no      Amnesic to event: no    Associated symptoms: no abdominal pain, no chest pain, no headaches, no loss of consciousness and no nausea    Risk factors: anticoagulation therapy (Warfarin chronically x 25 years), CABG (25 years prior), CAD and diabetes    Risk factors: no CHF        Review of Systems   Constitutional: Positive for diaphoresis.   Respiratory: Positive for shortness of breath.    Cardiovascular: Negative for chest pain.   Gastrointestinal: Negative for abdominal pain and nausea.   Skin: Positive for wound (Abrasions to the anterior scalp secondary to fall).   Neurological: Negative for loss of consciousness, syncope and headaches.   Psychiatric/Behavioral: Negative for confusion.   All other systems reviewed and are negative.      Past Medical History:   Diagnosis Date   • Atrial fibrillation (CMS/HCC)    • CAD (coronary artery disease)     s/p cabg   • Chronic anticoagulation    • Diabetes mellitus (CMS/HCC)     type II    • H/O hemiparesis     Rt sided    • Heart murmur     RHF as child.  no recent echos (pt declines)   • Hyperlipidemia    • Hypertension    • Hypothyroid    • Rheumatic heart disease     as a child, heart murmur, no recent echos (pt. declines)   • Stroke (CMS/HCC)     20+ years ago in OR during CABG        Allergies   Allergen Reactions   • Cephalosporins      adulthood   • Morphine And Related      unknown   • Penicillins      Penicillin G Potassium       Past Surgical History:   Procedure Laterality Date   • APPENDECTOMY     • BREAST SURGERY      no breast tissue, never had tara,  mel. breast implants   • CORONARY ARTERY BYPASS GRAFT      CVA  and a fib post-op - on coumadin since   • TOTAL HIP ARTHROPLASTY REVISION Right        Family History   Problem Relation Age of Onset   • Stomach cancer Mother    • Lung cancer Father    • Diabetes Brother    • Heart disease Brother    • No Known Problems Daughter    • No Known Problems Son    • No Known Problems Daughter        Social History     Socioeconomic History   • Marital status:      Spouse name: Not on file   • Number of children: Not on file   • Years of education: Not on file   • Highest education level: Not on file   Tobacco Use   • Smoking status: Former Smoker     Packs/day: 1.00     Years: 31.00     Pack years: 31.00     Types: Cigarettes     Start date: 1962     Last attempt to quit: 1993     Years since quittin.2   • Smokeless tobacco: Never Used   • Tobacco comment: quit after stroke.   Substance and Sexual Activity   • Alcohol use: No   • Drug use: No   • Sexual activity: Defer   Social History Narrative    Hx of CVA in  during CABG surgery with Rt hemiparesis and afib on coumadin since.  Elderly, frail and up in W/c.  Lives with           Objective   Physical Exam   Constitutional: She is oriented to person, place, and time. She appears well-developed and well-nourished.   HENT:   Head: Normocephalic.   Right Ear: External ear normal.   Left Ear: External ear normal.   Nose: Nose normal.   Eyes: Conjunctivae and EOM are normal. Pupils are equal, round, and reactive to light. No scleral icterus.   Neck: Normal range of motion. Neck supple.   Cardiovascular: Normal rate, regular rhythm and normal heart sounds.   No murmur heard.  Cardiac exam limited secondary to loud breath sounds.    Pulmonary/Chest: She is in respiratory distress (Patient is in significant respiratory distress). She has rhonchi. She has rales.   Breath sounds coarse bilaterally with rhonchi and crackles present in both  bases.    Abdominal: Soft. Bowel sounds are normal. There is no tenderness.   Musculoskeletal: She exhibits edema (3+ edema to the bilateral lower extremities with a brace in place on the distal right lower extremity.). She exhibits no tenderness (No obvious C-spine tenderness but exam is limited secondary to the patient's respiratory distress.).   Severe kyphosis.    Neurological: She is alert and oriented to person, place, and time.   Chronic right sided paralysis 2/2 stroke   Skin: Skin is warm. She is diaphoretic (Mildly).   Abrasion to the scalp noted.    Psychiatric: She has a normal mood and affect. Her behavior is normal.   Nursing note and vitals reviewed.      Critical Care  Performed by: Kash Gee DO  Authorized by: Kash Gee DO     Critical care provider statement:     Critical care time (minutes):  35    Critical care time was exclusive of:  Separately billable procedures and treating other patients    Critical care was necessary to treat or prevent imminent or life-threatening deterioration of the following conditions:  Respiratory failure    Critical care was time spent personally by me on the following activities:  Evaluation of patient's response to treatment, examination of patient, obtaining history from patient or surrogate, ordering and performing treatments and interventions, ordering and review of laboratory studies, ordering and review of radiographic studies, re-evaluation of patient's condition, development of treatment plan with patient or surrogate, pulse oximetry, discussions with consultants and review of old charts    I assumed direction of critical care for this patient from another provider in my specialty: no                 ED Course  ED Course as of Apr 04 2252   Thu Apr 04, 2019   2350 Dr. Gee discussed the patient's pneumothorax and suspected pneumonia with Dr. Veronica who suggests that due to the small size of the puncture to the lung close monitoring should be  sufficient for the time being. Additionally, Dr. Hdz has been paged for further consulation.   [BF]   2252 Pt responded well to bipap and will be admitted to the ICU.  [CP]      ED Course User Index  [BF] Grey Santana  [CP] Kash Gee DO     Recent Results (from the past 24 hour(s))   POC Troponin, Rapid    Collection Time: 04/04/19  9:23 PM   Result Value Ref Range    Troponin I 0.00 0.00 - 0.07 ng/mL   Comprehensive Metabolic Panel    Collection Time: 04/04/19  9:28 PM   Result Value Ref Range    Glucose 317 (H) 70 - 100 mg/dL    BUN 25 (H) 9 - 23 mg/dL    Creatinine 1.15 0.60 - 1.30 mg/dL    Sodium 141 132 - 146 mmol/L    Potassium 4.6 3.5 - 5.5 mmol/L    Chloride 105 99 - 109 mmol/L    CO2 23.0 20.0 - 31.0 mmol/L    Calcium 8.5 (L) 8.7 - 10.4 mg/dL    Total Protein 6.3 5.7 - 8.2 g/dL    Albumin 3.80 3.20 - 4.80 g/dL    ALT (SGPT) 96 (H) 7 - 40 U/L    AST (SGOT) 95 (H) 0 - 33 U/L    Alkaline Phosphatase 75 25 - 100 U/L    Total Bilirubin 0.3 0.3 - 1.2 mg/dL    eGFR Non African Amer 45 (L) >60 mL/min/1.73    Globulin 2.5 gm/dL    A/G Ratio 1.5 1.5 - 2.5 g/dL    BUN/Creatinine Ratio 21.7 7.0 - 25.0    Anion Gap 13.0 (H) 3.0 - 11.0 mmol/L   BNP    Collection Time: 04/04/19  9:28 PM   Result Value Ref Range    .0 (H) 0.0 - 100.0 pg/mL   Light Blue Top    Collection Time: 04/04/19  9:28 PM   Result Value Ref Range    Extra Tube hold for add-on    Green Top (Gel)    Collection Time: 04/04/19  9:28 PM   Result Value Ref Range    Extra Tube Hold for add-ons.    Lavender Top    Collection Time: 04/04/19  9:28 PM   Result Value Ref Range    Extra Tube hold for add-on    Gold Top - SST    Collection Time: 04/04/19  9:28 PM   Result Value Ref Range    Extra Tube Hold for add-ons.    CBC Auto Differential    Collection Time: 04/04/19  9:28 PM   Result Value Ref Range    WBC 14.79 (H) 3.50 - 10.80 10*3/mm3    RBC 4.16 3.89 - 5.14 10*6/mm3    Hemoglobin 12.6 11.5 - 15.5 g/dL    Hematocrit 41.4 34.5 -  44.0 %    MCV 99.5 (H) 80.0 - 99.0 fL    MCH 30.3 27.0 - 31.0 pg    MCHC 30.4 (L) 32.0 - 36.0 g/dL    RDW 13.0 11.3 - 14.5 %    RDW-SD 47.3 37.0 - 54.0 fl    MPV 10.6 6.0 - 12.0 fL    Platelets 351 150 - 450 10*3/mm3    Neutrophil % 53.9 41.0 - 71.0 %    Lymphocyte % 38.0 24.0 - 44.0 %    Monocyte % 6.1 0.0 - 12.0 %    Eosinophil % 1.5 0.0 - 3.0 %    Basophil % 0.5 0.0 - 1.0 %    Immature Grans % 0.5 0.0 - 0.6 %    Neutrophils, Absolute 7.98 1.50 - 8.30 10*3/mm3    Lymphocytes, Absolute 5.62 (H) 0.60 - 4.80 10*3/mm3    Monocytes, Absolute 0.90 0.00 - 1.00 10*3/mm3    Eosinophils, Absolute 0.22 0.00 - 0.30 10*3/mm3    Basophils, Absolute 0.07 0.00 - 0.20 10*3/mm3    Immature Grans, Absolute 0.08 (H) 0.00 - 0.05 10*3/mm3   TSH    Collection Time: 04/04/19  9:28 PM   Result Value Ref Range    TSH 2.666 0.350 - 5.350 mIU/mL   T4, Free    Collection Time: 04/04/19  9:28 PM   Result Value Ref Range    Free T4 0.93 0.89 - 1.76 ng/dL   Procalcitonin    Collection Time: 04/04/19  9:28 PM   Result Value Ref Range    Procalcitonin <0.05 <=0.25 ng/mL   POC Protime / INR    Collection Time: 04/04/19  9:32 PM   Result Value Ref Range    Protime 30.7 (H) 12.8 - 15.2 seconds    INR 2.7 (H) 0.8 - 1.2   Blood Gas, Arterial With Co-Ox    Collection Time: 04/04/19  9:43 PM   Result Value Ref Range    Site Left Radial     Pola's Test N/A     pH, Arterial 7.255 (L) 7.350 - 7.450 pH units    pCO2, Arterial 54.2 (H) 35.0 - 45.0 mm Hg    pO2, Arterial 93.6 83.0 - 108.0 mm Hg    HCO3, Arterial 24.0 20.0 - 26.0 mmol/L    Base Excess, Arterial -3.7 (L) 0.0 - 2.0 mmol/L    Hemoglobin, Blood Gas 12.7 (L) 14 - 18 g/dL    Hematocrit, Blood Gas 39.0 %    Oxyhemoglobin 94.5 94 - 99 %    Methemoglobin 0.70 0.00 - 1.50 %    Carboxyhemoglobin 1.2 0 - 2 %    CO2 Content 25.7 23 - 27 mmol/L    Temperature 37.0 C    Barometric Pressure for Blood Gas  mmHg    Modality BiPap     FIO2 60 %    IPAP 16     EPAP 8     Note      pH, Temp Corrected 7.255 pH  Units    pCO2, Temperature Corrected 54.2 (H) 35 - 45 mm Hg    pO2, Temperature Corrected 93.6 83 - 108 mm Hg     Note: In addition to lab results from this visit, the labs listed above may include labs taken at another facility or during a different encounter within the last 24 hours. Please correlate lab times with ED admission and discharge times for further clarification of the services performed during this visit.    XR Chest 1 View   Final Result   Extensive right lung interstitial infiltrate superimposed on emphysema, small right pneumothorax.      NOTIFICATION: Critical Value/emergent results were called by telephone at the time of interpretation on 4/4/2019 9:53 PM  to  Kash Gee MD who verbally acknowledged these results.      Signer Name: Kane Van MD    Signed: 4/4/2019 9:57 PM    Workstation Name: [id number]          CT Head Without Contrast    (Results Pending)   CT Cervical Spine Without Contrast    (Results Pending)   CT Chest Without Contrast    (Results Pending)     Vitals:    04/04/19 2135 04/04/19 2136 04/04/19 2239 04/04/19 2244   BP:  148/80  167/93   BP Location:  Right arm     Patient Position:  Sitting     Pulse: 85  82    Resp:   (!) 30    Temp:       TempSrc:       SpO2: 97%  97%    Weight:       Height:         Medications   sodium chloride 0.9 % flush 10 mL (not administered)   phytonadione (AQUA-MEPHYTON, VITAMIN K) 10 mg in sodium chloride 0.9 % 50 mL IVPB (not administered)   aztreonam (AZACTAM) 2 g in sodium chloride 0.9 % 100 mL IVPB-MBP (2 g Intravenous New Bag 4/4/19 2239)   vancomycin (VANCOCIN) in iso-osmotic dextrose IVPB 1 g (premix) 200 mL (not administered)   doxycycline (MONODOX) capsule 100 mg (not administered)   aztreonam (AZACTAM) 2 g in sodium chloride 0.9 % 100 mL IVPB-MBP (not administered)   digoxin (LANOXIN) injection 250 mcg (not administered)   dextrose (GLUTOSE) oral gel 15 g (not administered)   dextrose (D50W) 25 g/ 50mL Intravenous Solution 25 g  (not administered)   glucagon (human recombinant) (GLUCAGEN DIAGNOSTIC) injection 1 mg (not administered)   insulin lispro (humaLOG) injection 0-9 Units (not administered)   insulin regular (humuLIN R,novoLIN R) injection 7 Units (not administered)   furosemide (LASIX) injection 40 mg (not administered)     ECG/EMG Results (last 24 hours)     Procedure Component Value Units Date/Time    ECG 12 Lead [[id number]] Collected:  04/04/19 2108     Updated:  04/04/19 2109    Narrative:       Test Reason : fall  Blood Pressure : **/** mmHG  Vent. Rate : 105 BPM     Atrial Rate : 105 BPM     P-R Int : 174 ms          QRS Dur : 160 ms      QT Int : 396 ms       P-R-T Axes : 062 -66 083 degrees     QTc Int : 523 ms    Sinus tachycardia  Possible Left atrial enlargement  Left axis deviation  Left bundle branch block  Abnormal ECG  No previous ECGs available  Confirmed by MD UZNIGA CORY ([id number]) on 4/4/2019 9:09:39 PM    Referred By:  EDMD           Confirmed By:JAYNE ZUNIGA MD    ECG 12 Lead [[id number]] Collected:  04/04/19 2201     Updated:  04/04/19 2203        ECG 12 Lead         ECG 12 Lead   Final Result   Test Reason : fall   Blood Pressure : **/** mmHG   Vent. Rate : 105 BPM     Atrial Rate : 105 BPM      P-R Int : 174 ms          QRS Dur : 160 ms       QT Int : 396 ms       P-R-T Axes : 062 -66 083 degrees      QTc Int : 523 ms      Sinus tachycardia   Possible Left atrial enlargement   Left axis deviation   Left bundle branch block   Abnormal ECG   No previous ECGs available   Confirmed by MD ZUNIGA CORY ([id number]) on 4/4/2019 9:09:39 PM      Referred By:  EDMD           Confirmed By:JAYNE ZUNIGA MD      ECG 12 Lead    (Results Pending)                       MDM    Final diagnoses:   Acute on chronic respiratory failure with hypoxia and hypercapnia (CMS/HCC)   Pneumothorax on right   Opacity of lung on imaging study   Injury of head, initial encounter   Abrasion of scalp, initial encounter   Anticoagulated by anticoagulation  treatment   History of atrial fibrillation       Documentation assistance provided by daniela Santana.  Information recorded by the daniela was done at my direction and has been verified and validated by me.     Grey Santana  04/04/19 2140       Grey Santana  04/04/19 2206       Grey Santana  04/04/19 2219       Kash Gee DO  04/04/19 2251       Kash Gee DO  04/04/19 2259

## 2019-04-05 NOTE — PLAN OF CARE
Problem: Patient Care Overview  Goal: Plan of Care Review  Outcome: Ongoing (interventions implemented as appropriate)   04/05/19 9145   OTHER   Outcome Summary PT eval completed patient required max assist of 2 for bed mobility limited by pain in right rib area. she required max assist for sitting balance. patient may need short stay at SNF on D/C from [facility]   Coping/Psychosocial   Plan of Care Reviewed With patient;daughter

## 2019-04-05 NOTE — H&P
ICU ADMISSION NOTE    Chief complaint : A/C Mixed Respiratory Failure S/P Fall w/ Traumatic R sided Pneumothorax    Subjective     Patient is a 84 y.o. female w/ a PMHX of CAD (s/p CABG), CVA w/ R hemiparesis, AF (on Coumadin/digoxin/BB), and DM who is a resident at [facility], assisted living who suffered a fall while trying to get off the toilet.  Afterwards she had issues w/ tachypnea, dyspnea,  and pleuritic CP. EMS was contacted who noted that the patient was hypoxic upon their arrival w/ spO2 in 60s.  Patient was transported to our ED where workup revealed a small R apical PTX and lung infiltrate.  CTS was contacted by the ED physician who did not feel placement of a chest tube was warranted at this time.  She was placed on BiPAP .  She was given vitamin K 10 mg at my request to reverse her Coumadin.  Patient denies loss of consciousness.  She has no history of seizure disorder.  She denies hemoptysis.  Her right chest is painful with any movement, palpation, inspiration.  Her family reports that her  recently .  She just moved to an assisted living facility.  She uses a wheelchair.  Her granddaughter feels that the raised toilet seat leg folds down when she tries to get up and that may have contributed to her fall.  She does not wish any heroic measures, intubation, CPR.  A year ago she also fell fracturing left-sided ribs.      Review of Systems  Review of Systems   Constitutional: Positive for diaphoresis. Negative for chills and fever.   HENT: Negative.    Respiratory: Positive for shortness of breath.         Pleuritic CP   Cardiovascular: Negative.    Gastrointestinal: Negative.    Skin: Positive for wound.   Neurological: Negative.    Hematological: Bruises/bleeds easily.   Psychiatric/Behavioral: Negative.         Home Medications    No current facility-administered medications on file prior to encounter.      Current Outpatient Medications on File Prior to Encounter   Medication  Sig Dispense Refill   • atorvastatin (LIPITOR) 40 MG tablet TAKE ONE TABLET BY MOUTH EVERY DAY 30 tablet 2   • digoxin (LANOXIN) 250 MCG tablet TAKE ONE TABLET BY MOUTH EVERY DAY 30 tablet 2   • losartan-hydrochlorothiazide (HYZAAR) 100-25 MG per tablet TAKE ONE TABLET BY MOUTH EVERY DAY 30 tablet 2   • metFORMIN (GLUCOPHAGE) 1000 MG tablet Take 1 tablet by mouth 2 (Two) Times a Day With Meals. 180 tablet 1   • metoprolol succinate XL (TOPROL-XL) 100 MG 24 hr tablet TAKE ONE TABLET BY MOUTH EVERY DAY 30 tablet 2   • polyethylene glycol (MIRALAX) pack packet Take 17 g by mouth 2 (Two) Times a Day. 1 g 0   • senna (SENOKOT) 8.6 MG tablet tablet TAKE ONE TABLET BY MOUTH EVERY DAY AS NEEDED FOR CONSTIPATION 30 tablet 2   • traZODone (DESYREL) 50 MG tablet TAKE ONE TABLET BY MOUTH DAILY AT BEDTIME AS NEEDED FOR INSOMNIA 30 tablet 2   • warfarin (COUMADIN) 4 MG tablet TAKE ONE TABLET BY MOUTH EVERY DAY 30 tablet 2   • fluocinonide-emollient (LIDEX-E) 0.05 % cream Apply  topically 2 (Two) Times a Day. To shins as needed for redness 15 g 1   • Multiple Vitamins-Minerals (PRESERVISION AREDS 2 PO) Take 2 capsules by mouth Daily.     • warfarin (COUMADIN) 1 MG tablet TAKE 4 TABLETS BY MOUTH 3 DAYS A WEEK, AND 3 TABLETS BY MOUTH 4 DAYS A WEEK 108 tablet 5           History  Past Medical History:   Diagnosis Date   • Atrial fibrillation (CMS/HCC)    • CAD (coronary artery disease)     s/p cabg   • Chronic anticoagulation    • Diabetes mellitus (CMS/HCC)     type II    • H/O hemiparesis     Rt sided    • Heart murmur     RHF as child.  no recent echos (pt declines)   • Hyperlipidemia    • Hypertension    • Hypothyroid    • Rheumatic heart disease     as a child, heart murmur, no recent echos (pt. declines)   • Stroke (CMS/HCC)     20+ years ago in OR during CABG      Past Surgical History:   Procedure Laterality Date   • APPENDECTOMY     • BREAST SURGERY      no breast tissue, never had tara, mel. breast implants   • CORONARY  "ARTERY BYPASS GRAFT      CVA  and a fib post-op - on coumadin since   • TOTAL HIP ARTHROPLASTY REVISION Right      Family History   Problem Relation Age of Onset   • Stomach cancer Mother    • Lung cancer Father    • Diabetes Brother    • Heart disease Brother    • No Known Problems Daughter    • No Known Problems Son    • No Known Problems Daughter      Social History     Tobacco Use   • Smoking status: Former Smoker     Packs/day: 1.00     Years: 31.00     Pack years: 31.00     Types: Cigarettes     Start date: 1962     Last attempt to quit: 1993     Years since quittin.2   • Smokeless tobacco: Never Used   • Tobacco comment: quit after stroke.   Substance Use Topics   • Alcohol use: No   • Drug use: No       (Not in a hospital admission)  Allergies:  Cephalosporins; Morphine and related; and Penicillins    Objective     Vital Signs  Blood pressure 167/93, pulse 76, temperature 97.4 °F (36.3 °C), temperature source Oral, resp. rate (!) 30, height 177.8 cm (70\"), weight 49.9 kg (110 lb), SpO2 94 %.    Physical Exam:  General Appearance:   Frail, underweight, elderly white woman with kyphosis, upright on a stretcher wearing BiPAP   Head:   Frontoparietal abrasion, alopecia   Eyes:          Pupils equal and reactive, conjunctiva pink   Ears:     Throat:  Wearing full face BiPAP   Neck:  Marked kyphosis with inability to hold her head upright   Back:      Lungs:    Palpable subcutaneous air right lateral chest wall in the axillary region.  Tenderness to palpation in that area.  No visible external ecchymosis.  Coarse rhonchi bilaterally, healed sternotomy scar    Heart:   Heart sounds are distant, regular, sinus rhythm, soft systolic  murmur   Abdomen:    Flat, bowel sounds present, soft, nontender   Rectal:     Deferred   Extremities:  Brace on her right lower extremity, bilateral pitting edema.  Left fifth finger with ecchymosis, swelling, tenderness to palpation.  Right fifth finger with " swan-neck deformity   Pulses:      Skin:  Cool and dry   Lymph nodes:    Neurologic:  Awake and answers questions through her BiPAP mask.  Right upper extremity is rigid and nonfunctional.  Follows commands with her left side       Results Review:   Lab Results (last 24 hours)     Procedure Component Value Units Date/Time    [place] Draw [[id number]] Collected:  04/04/19 2128    Specimen:  Blood Updated:  04/04/19 2230    Narrative:       The following orders were created for panel order [place] Draw.  Procedure                               Abnormality         Status                     ---------                               -----------         ------                     Light Blue Top[[id number]]                                   Final result               Green Top (Gel)[[id number]]                                  Final result               Lavender Top[[id number]]                                     Final result               Gold Top - SST[[id number]]                                   Final result               Green Top (No Gel)[[id number]]                                                            Please view results for these tests on the individual orders.    Light Blue Top [[id number]] Collected:  04/04/19 2128    Specimen:  Blood Updated:  04/04/19 2230     Extra Tube hold for add-on     Comment: Auto resulted       Green Top (Gel) [[id number]] Collected:  04/04/19 2128    Specimen:  Blood Updated:  04/04/19 2230     Extra Tube Hold for add-ons.     Comment: Auto resulted.       Lavender Top [[id number]] Collected:  04/04/19 2128    Specimen:  Blood Updated:  04/04/19 2230     Extra Tube hold for add-on     Comment: Auto resulted       Gold Top - SST [[id number]] Collected:  04/04/19 2128    Specimen:  Blood Updated:  04/04/19 2230     Extra Tube Hold for add-ons.     Comment: Auto resulted.       Digoxin Level [[id number]] Collected:  04/04/19 2128    Specimen:  Blood Updated:  04/04/19 2227    Procalcitonin  [[id number]]  (Normal) Collected:  04/04/19 2128    Specimen:  Blood Updated:  04/04/19 2216     Procalcitonin <0.05 ng/mL     Narrative:       As a Marker for Sepsis (Non-Neonates):   1. <0.5 ng/mL represents a low risk of severe sepsis and/or septic shock.  2. >2 ng/mL represents a high risk of severe sepsis and/or septic shock.    As a Marker for Lower Respiratory Tract Infections that require antibiotic therapy:    PCT on Admission     Antibiotic Therapy       6-12 Hrs later  > 0.5                Strongly Recommended             >0.25 - <0.5         Recommended  0.1 - 0.25           Discouraged              Remeasure/reassess PCT  <0.1                 Strongly Discouraged     Remeasure/reassess PCT                     PCT values of < 0.5 ng/mL do not exclude an infection, because localized infections (without systemic signs) may be associated with such low concentrations, or a systemic infection in its initial stages (< 6 hours). Furthermore, increased PCT can occur without infection. PCT concentrations between 0.5 and 2.0 ng/mL should be interpreted taking into account the patient's history. It is recommended to retest PCT within 6-24 hours if any concentrations < 2 ng/mL are obtained.    BNP [[id number]]  (Abnormal) Collected:  04/04/19 2128    Specimen:  Blood Updated:  04/04/19 2212     .0 pg/mL      Comment: Results may be falsely decreased if patient taking Biotin.       Comprehensive Metabolic Panel [[id number]]  (Abnormal) Collected:  04/04/19 2128    Specimen:  Blood Updated:  04/04/19 2203     Glucose 317 mg/dL      BUN 25 mg/dL      Creatinine 1.15 mg/dL      Sodium 141 mmol/L      Potassium 4.6 mmol/L      Chloride 105 mmol/L      CO2 23.0 mmol/L      Calcium 8.5 mg/dL      Total Protein 6.3 g/dL      Albumin 3.80 g/dL      ALT (SGPT) 96 U/L      AST (SGOT) 95 U/L      Alkaline Phosphatase 75 U/L      Total Bilirubin 0.3 mg/dL      eGFR Non African Amer 45 mL/min/1.73      Globulin 2.5 gm/dL       A/G Ratio 1.5 g/dL      BUN/Creatinine Ratio 21.7     Anion Gap 13.0 mmol/L     Narrative:       National Kid[employer] Guidelines    Stage     Description        GFR  1         Normal or High     90+  2         Mild decrease      60-89  3         Moderate decrease  30-59  4         Severe decrease    15-29  5         Kidney failure     <15    The MDRD GFR formula is only valid for adults with stable renal function between ages 18 and 70.    TSH [[id number]]  (Normal) Collected:  04/04/19 2128    Specimen:  Blood Updated:  04/04/19 2203     TSH 2.666 mIU/mL     T4, Free [[id number]]  (Normal) Collected:  04/04/19 2128    Specimen:  Blood Updated:  04/04/19 2203     Free T4 0.93 ng/dL     Blood Culture - Blood, Arm, Left [[id number]] Collected:  04/04/19 2140    Specimen:  Blood from Arm, Left Updated:  04/04/19 2159    Blood Culture - Blood, Arm, Right [[id number]] Collected:  04/04/19 2150    Specimen:  Blood from Arm, Right Updated:  04/04/19 2159    POC Troponin, Rapid [[id number]]  (Normal) Collected:  04/04/19 2123    Specimen:  Blood Updated:  04/04/19 2146     Troponin I 0.00 ng/mL      Comment: Serial Number: [id number]:  [id number]       Blood Gas, Arterial With Co-Ox [[id number]]  (Abnormal) Collected:  04/04/19 2143    Specimen:  Arterial Blood Updated:  04/04/19 2143     Site Left Radial     Pola's Test N/A     pH, Arterial 7.255 pH units      Comment: 84 Value below reference range        pCO2, Arterial 54.2 mm Hg      Comment: 83 Value above reference range        pO2, Arterial 93.6 mm Hg      HCO3, Arterial 24.0 mmol/L      Base Excess, Arterial -3.7 mmol/L      Hemoglobin, Blood Gas 12.7 g/dL      Hematocrit, Blood Gas 39.0 %      Oxyhemoglobin 94.5 %      Methemoglobin 0.70 %      Carboxyhemoglobin 1.2 %      CO2 Content 25.7 mmol/L      Temperature 37.0 C      Barometric Pressure for Blood Gas -- mmHg      Comment: N/A        Modality BiPap     FIO2 60 %      IPAP 16     Comment: Meter:  [id number]     :  [id number]        EPAP 8     Note --     pH, Temp Corrected 7.255 pH Units      pCO2, Temperature Corrected 54.2 mm Hg      pO2, Temperature Corrected 93.6 mm Hg     CBC & Differential [[id number]] Collected:  04/04/19 2128    Specimen:  Blood Updated:  04/04/19 2137    Narrative:       The following orders were created for panel order CBC & Differential.  Procedure                               Abnormality         Status                     ---------                               -----------         ------                     CBC Auto Differential[[id number]]        Abnormal            Final result                 Please view results for these tests on the individual orders.    CBC Auto Differential [[id number]]  (Abnormal) Collected:  04/04/19 2128    Specimen:  Blood Updated:  04/04/19 2137     WBC 14.79 10*3/mm3      RBC 4.16 10*6/mm3      Hemoglobin 12.6 g/dL      Hematocrit 41.4 %      MCV 99.5 fL      MCH 30.3 pg      MCHC 30.4 g/dL      RDW 13.0 %      RDW-SD 47.3 fl      MPV 10.6 fL      Platelets 351 10*3/mm3      Neutrophil % 53.9 %      Lymphocyte % 38.0 %      Monocyte % 6.1 %      Eosinophil % 1.5 %      Basophil % 0.5 %      Immature Grans % 0.5 %      Neutrophils, Absolute 7.98 10*3/mm3      Lymphocytes, Absolute 5.62 10*3/mm3      Monocytes, Absolute 0.90 10*3/mm3      Eosinophils, Absolute 0.22 10*3/mm3      Basophils, Absolute 0.07 10*3/mm3      Immature Grans, Absolute 0.08 10*3/mm3     POC Protime / INR [[id number]]  (Abnormal) Collected:  04/04/19 2132    Specimen:  Blood Updated:  04/04/19 2135     Protime 30.7 seconds      INR 2.7     Comment: Serial Number: [id number]:  [id number]           Imaging Results (last 24 hours)     Procedure Component Value Units Date/Time    XR Chest 1 View [[id number]] Collected:  04/04/19 2157     Updated:  04/04/19 2159    Narrative:       CR Chest 1 Vw    INDICATION:   Short of air     COMPARISON:    To 518    FINDINGS:  Single  portable AP view of the chest.  Emphysema and cardiomegaly are redemonstrated. There is extensive interstitial infiltrate throughout the right lung, and there is a small right apical pneumothorax.      Impression:       Extensive right lung interstitial infiltrate superimposed on emphysema, small right pneumothorax.    NOTIFICATION: Critical Value/emergent results were called by telephone at the time of interpretation on 4/4/2019 9:53 PM  to  Kash Gee MD who verbally acknowledged these results.    Signer Name: Kane Van MD   Signed: 4/4/2019 9:57 PM   Workstation Name: [email or web address]              PROBLEM LIST      Acute on chronic mixed respiratory acidosis    Traumatic Pneumothorax on right    Diabetes mellitus without complication (CMS/HCC)    Coronary atherosclerosis    Atrial fibrillation; on Coumadin, Digoxin, Metoprolol    CVA (cerebral vascular accident) (CMS/HCC)    Essential hypertension, benign    Pure hypercholesterolemia    Rheumatic heart disease    Debilitated 84-year-old woman with previous stroke, right-sided weakness, diabetes who fell off of a raised toilet seat resulting in a small abrasion on her scalp that did not require sutures, likely rib fractures with a small right pneumothorax and a right lung contusion.  She does have a history of paroxysmal atrial fibrillation.  She is currently in sinus rhythm.  She has had no previous history of bradycardia.  It is unclear to me whether she lost consciousness.  Family seems to think she slipped and fell while trying to get up from the raised toilet seat.  This has resulted in respiratory failure with hypoxia and hypercapnia.  She does have underlying COPD as well as atrial fibrillation for which she takes Coumadin.  Currently however she is in sinus rhythm.  She recently lost her spouse.  She absolutely does not wish heroic measures like intubation, CPR, defibrillation.    Assessment/Plan   - ICU  - reverse Coumadin (10 mg Vitamin K given in  ED), F/U INR in am  - transition from NIPPV to HFNC, as tolerates; if remains tachypneic and desaturates will resume BiPAP at lower pressures and repeat chest x-ray tonight  - SSI  - check digoxin level, continue in am  - CXR in am  - mechanical DVT PPX  - Empiric ABX  - lasix 40 mg IV x 1  - BD  - AND    I discussed the patients findings and my recommendations with patient and family    Uri Poole, JAY  04/04/19  10:58 PM     I reviewed her chart, interviewed the patient and her family, performed the above physical examination, dictated the assessment and modified the plan to reflect my additions and findings    Milly Hdz MD    Time:50min    This note was produced with a voice recognition program and may have uncorrected errors.

## 2019-04-05 NOTE — PLAN OF CARE
Problem: Fall Risk (Adult)  Goal: Absence of Fall  Outcome: Ongoing (interventions implemented as appropriate)      Problem: Skin Injury Risk (Adult)  Goal: Skin Health and Integrity  Outcome: Ongoing (interventions implemented as appropriate)      Problem: Patient Care Overview  Goal: Plan of Care Review  Outcome: Ongoing (interventions implemented as appropriate)   04/05/19 5179   OTHER   Outcome Summary Patient was changed frfom HiFlo to nasal cannula on 2 liters earlier today. Sats have been about 90%. Pain is an issue when repostitioning. Fentanyl given twice in order to help. Will continue to monitor.    Coping/Psychosocial   Plan of Care Reviewed With patient;family   Plan of Care Review   Progress improving

## 2019-04-05 NOTE — THERAPY EVALUATION
Acute Care - Physical Therapy Initial Evaluation  [facility]     Patient Name: Kristin Magdaleno  : 1935  MRN: [id number]  Today's Date: 2019   Onset of Illness/Injury or Date of Surgery: 19  Date of Referral to PT: 19  Referring Physician: MD Wood      Admit Date: 2019    Visit Dx:     ICD-10-CM ICD-9-CM   1. Acute on chronic respiratory failure with hypoxia and hypercapnia (CMS/HCC) J96.21 518.84    J96.22 786.09     799.02   2. Pneumothorax on right J93.9 512.89   3. Opacity of lung on imaging study R91.8 793.19   4. Injury of head, initial encounter S09.90XA 959.01   5. Abrasion of scalp, initial encounter S00.01XA 910.0   6. Anticoagulated by anticoagulation treatment Z79.01 V58.61   7. History of atrial fibrillation Z86.79 V12.59   8. Impaired mobility and ADLs Z74.09 799.89   9. Impaired functional mobility, balance, gait, and endurance Z74.09 V49.89     Patient Active Problem List   Diagnosis   • Essential hypertension, benign   • Pure hypercholesterolemia   • Diabetes mellitus without complication (CMS/HCC)   • Coronary atherosclerosis   • CVA (cerebral vascular accident) (CMS/HCC)   • Atrial fibrillation; on Coumadin, Digoxin, Metoprolol   • Rheumatic heart disease   • Multiple closed fractures of ribs of left side   • Heart murmur   • Subcutaneous emphysema (CMS/HCC)   • Contusion of left lower extremity   • Renal cyst   • Lower extremity edema   • Chronic anticoagulation   • Traumatic Pneumothorax on right   • Acute on chronic mixed respiratory acidosis   • Acute on chronic respiratory failure with hypoxia and hypercapnia (CMS/HCC)     Past Medical History:   Diagnosis Date   • Atrial fibrillation (CMS/HCC)    • CAD (coronary artery disease)     s/p cabg   • Chronic anticoagulation    • Diabetes mellitus (CMS/HCC)     type II    • H/O hemiparesis     Rt sided    • Heart murmur     RHF as child.  no recent echos (pt declines)   • Hyperlipidemia    • Hypertension    •  Hypothyroid    • Rheumatic heart disease     as a child, heart murmur, no recent echos (pt. declines)   • Stroke (CMS/HCC)     20+ years ago in OR during CABG      Past Surgical History:   Procedure Laterality Date   • APPENDECTOMY     • BREAST SURGERY      no breast tissue, never had tara, mel. breast implants   • CORONARY ARTERY BYPASS GRAFT  1994    CVA  and a fib post-op - on coumadin since   • TOTAL HIP ARTHROPLASTY REVISION Right 1998        PT ASSESSMENT (last 12 hours)      Physical Therapy Evaluation     Row Name 04/05/19 1445          PT Evaluation Time/Intention    Subjective Information  complains of;weakness;fatigue;pain  -LIZETTE     Document Type  evaluation  -LIZETTE     Mode of Treatment  physical therapy  -LIZETTE     Patient Effort  good  -LIZETTE     Symptoms Noted During/After Treatment  fatigue;increased pain  -LIZETTE     Row Name 04/05/19 144          General Information    Patient Profile Reviewed?  yes  -LIZETTE     Onset of Illness/Injury or Date of Surgery  04/04/19  -LIZETTE     Referring Physician  MD Wood  -LIZETTE     Patient Observations  alert;cooperative;agree to therapy  -LIZETTE     Patient/Family Observations  daughter present and supportive  -LIZETTE     Prior Level of Function  independent:;transfer;w/c or scooter;bed mobility;min assist:;ADL's  -LIZETTE     Equipment Currently Used at Home  bath bench;wheelchair  -LIZETTE     Pertinent History of Current Functional Problem  presents after falling in the bathroom at assisted living facility she was found to have hypoxic resp failure  -LIZETTE     Existing Precautions/Restrictions  fall;oxygen therapy device and L/min  (Significant)   -LIZETTE     Limitations/Impairments  safety/cognitive;visual  -LIZETTE     Risks Reviewed  patient:;family:;LOB;increased discomfort  -LIZETTE     Benefits Reviewed  patient:;family:;improve function;increase strength;decrease risk of DVT  -LIZETTE     Barriers to Rehab  medically complex;previous functional deficit;visual deficit  (Significant)  patient is blind  -LIZETTE     Row  Name 04/05/19 1445          Relationship/Environment    Lives With  facility resident  -LIZETTE     Family Caregiver if Needed  child(ni), adult  -LIZETTE     Row Name 04/05/19 1445          Resource/Environmental Concerns    Current Living Arrangements  independent/assisted living facility  -     Resource/Environmental Concerns  none  -     Row Name 04/05/19 1445          Cognitive Assessment/Intervention- PT/OT    Affect/Mental Status (Cognitive)  WFL  -LIZETTE     Orientation Status (Cognition)  oriented x 3  -LIZETTE     Follows Commands (Cognition)  follows one step commands;over 90% accuracy  -     Cognitive Function (Cognitive)  safety deficit  -     Safety Deficit (Cognitive)  safety precautions awareness;safety precautions follow-through/compliance  -     Personal Safety Interventions  fall prevention program maintained;gait belt;nonskid shoes/slippers when out of bed  -     Row Name 04/05/19 1446          Bed Mobility Assessment/Treatment    Bed Mobility Assessment/Treatment  supine-sit;sit-supine  -LIZETTE     Supine-Sit [place] (Bed Mobility)  maximum assist (25% patient effort);2 person assist  -     Sit-Supine [place] (Bed Mobility)  maximum assist (25% patient effort);2 person assist  -LIZETTE     Bed Mobility, Safety Issues  decreased use of arms for pushing/pulling;decreased use of legs for bridging/pushing  -     Assistive Device (Bed Mobility)  bed rails;draw sheet  -     Row Name 04/05/19 144          Transfer Assessment/Treatment    Comment (Transfers)  patient declined OOB today due to right rib pain discussed importance of moving patient and family understand  -     Row Name 04/05/19 1446          Gait/Stairs Assessment/Training    Comment (Gait/Stairs)  N/A patient is non ambulatory she uses W/C for mobility  -     Row Name 04/05/19 1442          General ROM    GENERAL ROM COMMENTS  see OT eval for right UE. RLE with decreased knee flexion and ankle movement  -     Row Name 04/05/19  1445          MMT (Manual Muscle Testing)    General MMT Comments  LLE 3+/5 RLE 0/5 with increase extensor tone  -     Row Name 04/05/19 1445          Motor Assessment/Intervention    Additional Documentation  Balance (Group);Therapeutic Exercise (Group);Therapeutic Exercise Interventions (Group)  -     Row Name 04/05/19 1445          Therapeutic Exercise    Therapeutic Exercise  seated, lower extremities  -     Additional Documentation  Therapeutic Exercise (Row)  -     Row Name 04/05/19 1445          Lower Extremity Seated Therapeutic Exercise    Performed, Seated Lower Extremity (Therapeutic Exercise)  hip flexion/extension;knee flexion/extension;ankle dorsiflexion/plantarflexion on left only PROM to right   -LIZETTE     Exercise Type, Seated Lower Extremity (Therapeutic Exercise)  AROM (active range of motion)  -LIZETTE     Sets/Reps Detail, Seated Lower Extremity (Therapeutic Exercise)  2/10  -     Row Name 04/05/19 1448          Static Sitting Balance    Level of [place] (Unsupported Sitting, Static Balance)  maximal assist, 25 to 49% patient effort  -     Sitting Position (Unsupported Sitting, Static Balance)  sitting on edge of bed  -LIZETTE     Comment (Unsupported Sitting, Static Balance)  patient initially leaning to the right ans posteriorly she could hold midline for short periods again needs assist when performing LE exercise  -     Row Name 04/05/19 144          Vision Assessment/Intervention    Visual Impairment/Limitations  legally blind  (Significant)   -     Row Name 04/05/19 1449          Pain Scale 2: FACES Pre/Post-Treatment    Pain 2: FACES Scale, Pretreatment  4-->hurts little more  -LIZETTE     Pain 2: FACES Scale, Post-Treatment  4-->hurts little more  -LIZETTE     Pain Location 2 - Side  Right  -LIZETTE     Pain Location 2  other (see comments) rib area  -LIZETTE     Pain Intervention(s) 2  Repositioned;Ambulation/increased activity;Rest  -     Row Name             [REMOVED] Wound 04/05/19 0030 Right  upper gluteal pressure injury    Wound - Properties Group Date first assessed: 04/05/19  -LH Time first assessed: 0030  -LH Side: Right  -LH Orientation: upper  -LH Location: gluteal  -LH Type: pressure injury  -LH Stage, Pressure Injury: Stage 2  -LH Resolution Date: 04/05/19  -MR, reclassified  Resolution Time: 0836  -MR    Row Name             Wound 04/05/19 0030 Right medial gluteal    Wound - Properties Group Date first assessed: 04/05/19  -MR Time first assessed: 0030  -MR Present On Admission : yes  -MR Side: Right  -MR Orientation: medial  -MR Location: gluteal  -MR Stage, Pressure Injury: Stage 3  -MR    Row Name 04/05/19 1445          Coping    Observed Emotional State  calm;cooperative  -LIZETTE     Verbalized Emotional State  acceptance  -LIZETTE     Row Name 04/05/19 1445          Plan of Care Review    Plan of Care Reviewed With  patient;daughter  -LIZETTE     Row Name 04/05/19 1445          Physical Therapy Clinical Impression    Date of Referral to PT  04/05/19  -LIZETTE     PT Diagnosis (PT Clinical Impression)  impaired bed mobility transfers and balance decreased strength  -LIZETTE     Patient/Family Goals Statement (PT Clinical Impression)  patient to go back to assisted living facility  -LIZETTE     Criteria for Skilled Interventions Met (PT Clinical Impression)  yes;treatment indicated  -LIZETTE     Rehab Potential (PT Clinical Summary)  fair, will monitor progress closely  -LIZETTE     Care Plan Review (PT)  evaluation/treatment results reviewed;care plan/treatment goals reviewed;risks/benefits reviewed;patient/other agree to care plan  -LIZETTE     Care Plan Review, Other Participant (PT Clinical Impression)  daughter  -LIZETTE     Row Name 04/05/19 1445          Physical Therapy Goals    Bed Mobility Goal Selection (PT)  bed mobility, PT goal 1  -LIZETTE     Transfer Goal Selection (PT)  transfer, PT goal 1  -LIZETTE     Balance Goal Selection (PT)  balance, PT goal 1  -LIZETTE     Additional Documentation  Balance Goal Selection (PT) (Row)  -LIZETTE     Row  Name 04/05/19 1445          Bed Mobility Goal 1 (PT)    Activity/Assistive Device (Bed Mobility Goal 1, PT)  sit to supine/supine to sit  -LIZETTE     Young Level/Cues Needed (Bed Mobility Goal 1, PT)  independent  -LIZETTE     Time Frame (Bed Mobility Goal 1, PT)  long term goal (LTG);10 days  -LIZETTE     Progress/Outcomes (Bed Mobility Goal 1, PT)  goal ongoing  -LIZETTE     Row Name 04/05/19 1445          Transfer Goal 1 (PT)    Activity/Assistive Device (Transfer Goal 1, PT)  sit-to-stand/stand-to-sit  -LIZETTE     Young Level/Cues Needed (Transfer Goal 1, PT)  independent  -LIZETTE     Time Frame (Transfer Goal 1, PT)  long term goal (LTG);10 days  -LIZETTE     Progress/Outcome (Transfer Goal 1, PT)  goal ongoing  -LIZETTE     Row Name 04/05/19 1445          Balance Goal 1 (PT)    Activity/Assistive Device (Balance Goal 1, PT)  sitting, static  -LIZETTE     Young Level/Cues Needed (Balance Goal 1, PT)  independent  -LIZETTE     Time Frame (Balance Goal 1, PT)  long term goal (LTG);10 days  -LIZETTE     Progress/Outcomes (Balance Goal 1, PT)  goal ongoing  -LIZETTE     Row Name 04/05/19 1445          Patient Education Goal (PT)    Activity (Patient Education Goal, PT)  HEP  -LIZETTE     Young/Cues/Accuracy (Memory Goal 2, PT)  verbalizes understanding  -LIZETTE     Time Frame (Patient Education Goal, PT)  long term goal (LTG);10 days  -LIZETTE     Progress/Outcome (Patient Education Goal, PT)  goal ongoing  -LIZETTE     Row Name 04/05/19 1445          Positioning and Restraints    Pre-Treatment Position  in bed  -LIZETTE     Post Treatment Position  bed  -LIZETTE     In Bed  side lying left;notified nsg;call light within reach;exit alarm on;with family/caregiver  -LIZETTE       User Key  (r) = Recorded By, (t) = Taken By, (c) = Cosigned By    Initials Name Provider Type    Maria C Boswell, PT Physical Therapist    Julia Novoa, RN Registered Nurse    Kari Barriga RN Registered Nurse        Physical Therapy Education     Title: PT OT SLP Therapies (In  Progress)     Topic: Physical Therapy (In Progress)     Point: Mobility training (In Progress)     Learning Progress Summary           Patient Acceptance, E, NR by LIZETTE at 4/5/2019  2:45 PM   Family Acceptance, E, NR by LIZETTE at 4/5/2019  2:45 PM                   Point: Home exercise program (In Progress)     Learning Progress Summary           Patient Acceptance, E, NR by LIZETTE at 4/5/2019  2:45 PM   Family Acceptance, E, NR by LIZETTE at 4/5/2019  2:45 PM                   Point: Body mechanics (In Progress)     Learning Progress Summary           Patient Acceptance, E, NR by LIZETTE at 4/5/2019  2:45 PM   Family Acceptance, E, NR by LIZETTE at 4/5/2019  2:45 PM                   Point: Precautions (In Progress)     Learning Progress Summary           Patient Acceptance, E, NR by LIZETTE at 4/5/2019  2:45 PM   Family Acceptance, E, NR by LIZETET at 4/5/2019  2:45 PM                               User Key     Initials Effective Dates Name Provider Type Discipline     06/19/15 -  Maria C Pierson, PT Physical Therapist PT              PT Recommendation and Plan  Anticipated Discharge Disposition (PT): assisted living facility ([facility])  Planned Therapy Interventions (PT Eval): balance training, bed mobility training, home exercise program, strengthening, transfer training  Therapy Frequency (PT Clinical Impression): daily  Outcome Summary/Treatment Plan (PT)  Anticipated Discharge Disposition (PT): assisted living facility ([facility])  Plan of Care Reviewed With: patient, daughter  Outcome Summary: PT eval completed patient required max assist of 2 for bed mobility limited by pain in right rib area. she required max assist for sitting balance. patient may need short stay at SNF on D/C from [facility]  Outcome Measures     Row Name 04/05/19 1445 04/05/19 1433          How much help from another person do you currently need...    Turning from your back to your side while in flat bed without using bedrails?  2  -LIZETTE  --     Moving from lying on back to sitting on  the side of a flat bed without bedrails?  2  -LIZETTE  --     Moving to and from a bed to a chair (including a wheelchair)?  1  -LIZETTE  --     Standing up from a chair using your arms (e.g., wheelchair, bedside chair)?  1  -LIZETTE  --     Climbing 3-5 steps with a railing?  1  -LIZETTE  --     To walk in hospital room?  1  -LIZETTE  --     AM-PAC 6 Clicks Score  8  -LIZETTE  --        How much help from another is currently needed...    Putting on and taking off regular lower body clothing?  --  1  -CL     Bathing (including washing, rinsing, and drying)  --  1  -CL     Toileting (which includes using toilet bed pan or urinal)  --  1  -CL     Putting on and taking off regular upper body clothing  --  2  -CL     Taking care of personal grooming (such as brushing teeth)  --  2  -CL     Eating meals  --  2  -CL     Score  --  9  -CL        Functional Assessment    Outcome Measure Options  AM-PAC 6 Clicks Basic Mobility (PT)  -LIZETTE  AM-PAC 6 Clicks Daily Activity (OT)  -CL       User Key  (r) = Recorded By, (t) = Taken By, (c) = Cosigned By    Initials Name Provider Type    Maria C Boswell PT Physical Therapist    CL Regina Barnhart, OT Occupational Therapist         Time Calculation:   PT Charges     Row Name 04/05/19 1445             Time Calculation    Start Time  1445  -LIZETTE      PT Received On  04/05/19  -LIZETTE      PT Goal Re-Cert Due Date  04/15/19  -LIZETTE        User Key  (r) = Recorded By, (t) = Taken By, (c) = Cosigned By    Initials Name Provider Type    Maria C Boswell PT Physical Therapist        Therapy Charges for Today     Code Description Service Date Service Provider Modifiers Qty    [id number] HC PT EVAL HIGH COMPLEXITY 4 4/5/2019 Maria C Pierson, PT GP 1          PT G-Codes  Outcome Measure Options: AM-PAC 6 Clicks Basic Mobility (PT)  AM-PAC 6 Clicks Score: 8  Score: 9      Maria C Pierson PT  4/5/2019

## 2019-04-06 PROBLEM — M40.202 KYPHOSIS OF CERVICAL REGION: Status: ACTIVE | Noted: 2019-01-01

## 2019-04-06 PROBLEM — R62.7 FAILURE TO THRIVE IN ADULT: Status: ACTIVE | Noted: 2019-01-01

## 2019-04-06 NOTE — PLAN OF CARE
Problem: Fall Risk (Adult)  Goal: Absence of Fall  Outcome: Ongoing (interventions implemented as appropriate)      Problem: Skin Injury Risk (Adult)  Goal: Skin Health and Integrity  Outcome: Ongoing (interventions implemented as appropriate)      Problem: Patient Care Overview  Goal: Plan of Care Review  Outcome: Ongoing (interventions implemented as appropriate)   04/06/19 4394   OTHER   Outcome Summary PT has been on HiFlo all shift. Continous pain medicine administered. Will continue to monitor.    Coping/Psychosocial   Plan of Care Reviewed With patient   Plan of Care Review   Progress no change

## 2019-04-06 NOTE — PLAN OF CARE
Problem: Patient Care Overview  Goal: Plan of Care Review  Outcome: Ongoing (interventions implemented as appropriate)   04/06/19 1000   Coping/Psychosocial   Plan of Care Reviewed With patient;family;daughter   SLP clinical dysphagia evaluation completed. Will address suspected oropharyngeal dysphagia w/ f/u for further POC decisions. Please see note for further details and recommendations.

## 2019-04-06 NOTE — THERAPY EVALUATION
Acute Care - Speech Language Pathology   Swallow Initial Evaluation [facility]   Clinical Swallow Evaluation     Patient Name: Kristin Magdaleno  : 1935  MRN: [id number]  Today's Date: 2019  Onset of Illness/Injury or Date of Surgery: 19     Referring Physician: MD Wood      Admit Date: 2019    Visit Dx:     ICD-10-CM ICD-9-CM   1. Acute on chronic respiratory failure with hypoxia and hypercapnia (CMS/HCC) J96.21 518.84    J96.22 786.09     799.02   2. Pneumothorax on right J93.9 512.89   3. Opacity of lung on imaging study R91.8 793.19   4. Injury of head, initial encounter S09.90XA 959.01   5. Abrasion of scalp, initial encounter S00.01XA 910.0   6. Anticoagulated by anticoagulation treatment Z79.01 V58.61   7. History of atrial fibrillation Z86.79 V12.59   8. Impaired mobility and ADLs Z74.09 799.89   9. Impaired functional mobility, balance, gait, and endurance Z74.09 V49.89   10. Dysphagia, unspecified type R13.10 787.20     Patient Active Problem List   Diagnosis   • Essential hypertension, benign   • Pure hypercholesterolemia   • Diabetes mellitus without complication (CMS/HCC)   • Coronary atherosclerosis   • CVA (cerebral vascular accident) (CMS/HCC)   • Atrial fibrillation; on Coumadin, Digoxin, Metoprolol   • Rheumatic heart disease   • Multiple closed fractures of ribs of left side   • Heart murmur   • Subcutaneous emphysema (CMS/HCC)   • Contusion of left lower extremity   • Renal cyst   • Lower extremity edema   • Chronic anticoagulation   • Traumatic Pneumothorax on right   • Acute on chronic mixed respiratory acidosis   • Acute on chronic respiratory failure with hypoxia and hypercapnia (CMS/HCC)     Past Medical History:   Diagnosis Date   • Atrial fibrillation (CMS/HCC)    • CAD (coronary artery disease)     s/p cabg   • Chronic anticoagulation    • Diabetes mellitus (CMS/HCC)     type II    • H/O hemiparesis     Rt sided    • Heart murmur     RHF as child.  no recent  echos (pt declines)   • Hyperlipidemia    • Hypertension    • Hypothyroid    • Rheumatic heart disease     as a child, heart murmur, no recent echos (pt. declines)   • Stroke (CMS/HCC)     20+ years ago in OR during CABG      Past Surgical History:   Procedure Laterality Date   • APPENDECTOMY     • BREAST SURGERY      no breast tissue, never had tara, mel. breast implants   • CORONARY ARTERY BYPASS GRAFT  1994    CVA  and a fib post-op - on coumadin since   • TOTAL HIP ARTHROPLASTY REVISION Right 1998        SWALLOW EVALUATION (last 72 hours)      SLP Adult Swallow Evaluation     Row Name 04/06/19 1000                Rehab Evaluation    Document Type  evaluation  -RD       Subjective Information  complains of;weakness  -RD       Patient Observations  alert;cooperative  -RD       Patient/Family Observations  family present  -RD       Patient Effort  good  -RD          General Information    Patient Profile Reviewed  yes  -RD       Pertinent History Of Current Problem  Adm w/ A/C mixed resp acidosis. Rib fractures. Fall. Rheumatic heart dz. Resp failure. Kyphosis. Prior CVA w/ dysphagia that resolved per family. No dietary restrictions since then. CXR revealed increasing R lung airspace dz.   -RD       Current Method of Nutrition  regular textures;thin liquids  -RD       Precautions/Limitations, Vision  vision impairment, bilaterally  -RD       Precautions/Limitations, Hearing  WFL;for purposes of eval  -RD       Prior Level of Function-Communication  other (see comments) baseline unknown  -RD       Prior Level of Function-Swallowing  no diet consistency restrictions  -RD       Plans/Goals Discussed with  patient;family;agreed upon  -RD       Barriers to Rehab  medically complex  -RD       Patient's Goals for Discharge  declined (alternate means of nutrition/hydration);declined thickened liquids;declined modified diet;return to regular diet;comfort diet  -RD       Family Goals for Discharge  patient able to return to  all previous activities/roles;declined (alternate means of nutrition/hydration);declined thickened liquids;declined modified diet;comfort diet  -RD          Pain Assessment    Additional Documentation  Pain Scale: Numbers Pre/Post-Treatment (Group)  -RD          Pain Scale: Numbers Pre/Post-Treatment    Pain Scale: Numbers, Pretreatment  0/10 - no pain  -RD       Pain Scale: Numbers, Post-Treatment  0/10 - no pain  -RD          Oral Motor and Function    Dentition Assessment  poor oral hygiene  -RD       Secretion Management  anterior loss  -RD       Mucosal Quality  sticky  -RD       Volitional Swallow  delayed  -RD       Volitional Cough  weak;non-productive  -RD          Oral Musculature and Cranial Nerve Assessment    Oral Motor General Assessment  generalized oral motor weakness;oral labial or buccal impairment;lingual impairment;mandibular impairment  -RD       Mandibular Impairment Detail, Cranial Nerve V (Trigeminal)  reduced strength bilaterally  -RD       Oral Labial or Buccal Impairment, Detail, Cranial Nerve VII (Facial):  reduced strength bilaterally;reduced ROM  -RD       Lingual Impairment, Detail. Cranial Nerves IX, XII (Glossopharyngeal and Hypoglossal)  reduced strength;bilaterally;reduced lingual ROM  -RD          General Eating/Swallowing Observations    Respiratory Support Currently in Use  nasal cannula;other (see comments) HFNC  -RD       Eating/Swallowing Skills  fed by SLP;self-fed  -RD       Positioning During Eating  upright in chair;other (see comments) appears to have cervical kyphosis  -RD       Utensils Used  spoon;straw;other (see comments) DNT cup 2' head positioning  -RD       Consistencies Trialed  thin liquids;nectar/syrup-thick liquids;pureed;regular textures  -RD          Respiratory    Respiratory Status  increase in respiratory rate;during swallowing/eating  -RD          Clinical Swallow Eval    Oral Prep Phase  impaired  -RD       Oral Transit  impaired  -RD       Oral  Residue  impaired  -RD       Pharyngeal Phase  suspected pharyngeal impairment  -RD       Clinical Swallow Evaluation Summary  Clinical dysphagia eval complete. Incr'd prep and transit time w/ all consistencies. Anterior loss w/ all trials. Moderate oral residue w/ all consistenices diffusely t/o oral cavity. Unable to clear w/ liquid wash (> w/ solids). Suspect oral weakness as well as suspected forward head positioning impacting somewhat. Trials of thins, nectar, pureed, and solid consistecies. Overt s/s of aspiration c/b inconsistent wet vocal quality w/ thins. No immediate overt s/s w/ nectar-thick or pureed, but oral residue w/ all PO increases aspiration risk. Suspect oropharyngeal dysphagia. Discussed instrumental swallowing study (MBS vs. FEES), modified diets, thickening liquids, swallowing compensations, and s/s and risk of aspiration w/ pt and family. Pt/family declined any alternative feeding methods, and did not wish for any diet modifications. Discussed comfort diet and GOC. Safest would be Nectar and pureed until able to complete MBS, but pt/family would like to continue regular diet despite risk of aspiration. Given kyphosis, ? chronic anatomical component possible, although unable to determine w/o instrumental eval. Pt may benefit from palliative consult. SLP will f/u for further GOC decisions. Pt/family agreeable to SLP f/u for further decisions tomorrow.   -RD          Oral Prep Concerns    Oral Prep Concerns  prolonged mastication;incomplete or weak lip closure around spoon;anterior loss;increased prep time;incomplete bolus preparation  -RD       Prolonged Mastication  regular consistencies  -RD       Incomplete or Weak Lip Closure Around Spoon  all consistencies  -RD       Anterior Loss  thin;nectar;pudding;regular consistencies  -RD       Incomplete Bolus Preparation  regular consistencies  -RD       Increased Prep Time  all consistencies  -RD          Oral Transit Concerns    Oral Transit  Concerns  increased oral transit time  -RD       Increased Oral Transit Time  all consistencies  -RD          Oral Residue Concerns    Oral Residue Concerns  diffuse residue throughout oral cavity  -RD       Diffuse Residue Throughout Oral Cavity  all consistencies;other (see comments) > w/ solids  -RD       Oral Residue Concerns, Comment  unable to fully clear oral residue contributing to aspiration risk  -RD          Pharyngeal Phase Concerns    Pharyngeal Phase Concerns  wet vocal quality  -RD       Wet Vocal Quality  thin;other (see comments) intermittently  -RD       Pharyngeal Phase Concerns, Comment  At risk for aspiration w/ PO. Suspect pharyngeal dysphagia. Pt/family declined MBS/FEES, TFs, and modified diet options at this time. SLP will f/u for POC decisions.  -RD          Clinical Impression    SLP Swallowing Diagnosis  moderate;oral dysfunction;suspected pharyngeal dysfunction  -RD       Functional Impact  risk of aspiration/pneumonia;risk of malnutrition;risk of dehydration  -RD       Rehab Potential/Prognosis, Swallowing  re-evaluate goals as necessary  -RD       Swallow Criteria for Skilled Therapeutic Interventions Met  demonstrates skilled criteria  -RD          Recommendations    Therapy Frequency (Swallow)  evaluation only;PRN  -RD       Predicted Duration Therapy Intervention (Days)  1 week  -RD       SLP Diet Recommendation  puree;nectar thick liquids;other (see comments) pt/fam declined modified diet, wish to cont Regular/thins  -RD       Recommended Diagnostics  reassess via clinical swallow evaluation;other (see comments) f/u for further POC/decisions; declined MBS/FEES  -RD       Recommended Precautions and Strategies  upright posture during/after eating;small bites of food and sips of liquid;alternate between small bites of food and sips of liquid;check mouth frequently for oral residue/pocketing;other (see comments) General aspiration precautions; Agressive oral care  -RD       SLP Rec.  for Method of Medication Administration  meds crushed;with pudding or applesauce;as tolerated  -RD       Anticipated Dischage Disposition  unknown  -         User Key  (r) = Recorded By, (t) = Taken By, (c) = Cosigned By    Initials Name Effective Dates    Mia Gillis MS CCC-SLP 04/03/18 -           EDUCATION  The patient has been educated in the following areas:   Dysphagia (Swallowing Impairment) Oral Care/Hydration NPO rationale Modified Diet Instruction.    SLP Recommendation and Plan  SLP Swallowing Diagnosis: moderate, oral dysfunction, suspected pharyngeal dysfunction  SLP Diet Recommendation: puree, nectar thick liquids, other (see comments)(pt/fam declined modified diet, wish to cont Regular/thins)  Recommended Precautions and Strategies: upright posture during/after eating, small bites of food and sips of liquid, alternate between small bites of food and sips of liquid, check mouth frequently for oral residue/pocketing, other (see comments)(General aspiration precautions; Agressive oral care)        Recommended Diagnostics: reassess via clinical swallow evaluation, other (see comments)(f/u for further POC/decisions; declined MBS/FEES)  Swallow Criteria for Skilled Therapeutic Interventions Met: demonstrates skilled criteria  Anticipated Dischage Disposition: unknown  Rehab Potential/Prognosis, Swallowing: re-evaluate goals as necessary  Therapy Frequency (Swallow): evaluation only, PRN  Predicted Duration Therapy Intervention (Days): 1 week       Plan of Care Reviewed With: patient, family, daughter  Plan of Care Review  Plan of Care Reviewed With: patient, family, daughter           Time Calculation:   Time Calculation- SLP     Row Name 04/06/19 1216             Time Calculation- SLP    SLP Start Time  1000  -RD      SLP Received On  04/06/19  -        User Key  (r) = Recorded By, (t) = Taken By, (c) = Cosigned By    Initials Name Provider Type    Mia Gillis MS CCC-SLP Speech  and Language Pathologist          Therapy Charges for Today     Code Description Service Date Service Provider Modifiers Qty    [id number]  ST EVAL ORAL PHARYNG SWALLOW 4 4/6/2019 Mia Hurst, MS CCC-SLP GN 1               Mia Hurst MS CCC-SLP  4/6/2019

## 2019-04-07 NOTE — THERAPY TREATMENT NOTE
Acute Care - Speech Language Pathology   Swallow Progress Note [facility]     Patient Name: Kristin Magdaleno  : 1935  MRN: [id number]  Today's Date: 2019  Onset of Illness/Injury or Date of Surgery: 19     Referring Physician: MD Wood      Admit Date: 2019    Visit Dx:      ICD-10-CM ICD-9-CM   1. Acute on chronic respiratory failure with hypoxia and hypercapnia (CMS/HCC) J96.21 518.84    J96.22 786.09     799.02   2. Pneumothorax on right J93.9 512.89   3. Opacity of lung on imaging study R91.8 793.19   4. Injury of head, initial encounter S09.90XA 959.01   5. Abrasion of scalp, initial encounter S00.01XA 910.0   6. Anticoagulated by anticoagulation treatment Z79.01 V58.61   7. History of atrial fibrillation Z86.79 V12.59   8. Impaired mobility and ADLs Z74.09 799.89   9. Impaired functional mobility, balance, gait, and endurance Z74.09 V49.89   10. 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 Data deficit. No echoes   • Multiple closed fractures of ribs of left side   • Heart murmur   • Subcutaneous emphysema (CMS/HCC)   • Contusion of left lower extremity   • Renal cyst   • Lower extremity edema   • Chronic anticoagulation   • Small right traumatic pneumothorax due to fall and multiple right rib fractures   • Acute on chronic mixed respiratory acidosis   • Acute on chronic hypoxemic/hypercarbic respiratory failure (CMS/HCC)   • Severe cervical kyphosis presumably due to osteoporosis and compression fractures   • Failure to thrive.  Wheelchair-bound       Therapy Treatment  Rehabilitation Treatment Summary     Row Name 19 1030              Treatment Time/Intention    Discipline  speech language pathologist  -AV      Document Type  therapy note (daily note) care giver instruction   -AV      Subjective Information  no complaints  -AV      Mode of Treatment  speech-language pathology  -AV      Patient/Family Observations  daughters present   -AV      Patient Effort  other (see comments)  -AV      Recorded by [AV] Petra Scott, MS CCC-SLP 04/07/19 145      Row Name                Wound 04/05/19 0030 Right medial gluteal    Wound - Properties Group Date first assessed: 04/05/19 [MR] Time first assessed: 0030 [MR] Present On Admission : yes [MR] Side: Right [MR] Orientation: medial [MR] Location: gluteal [MR] Stage, Pressure Injury: Stage 3 [MR] Recorded by:  [MR] Julia Gonzalez, RN 04/05/19 1016    Row Name 04/07/19 1030             Outcome Summary/Treatment Plan (SLP)    Plan for Continued Treatment (SLP)  Long discussion with two daughters about eval, prognosis, poor positioning, history of dysphagia and patient wishes.  After discussing risks for aspiration, daughters verbalized understanding.  MD entered room and reinforced patient does not want enteral feeding or modified diet of any kind. DAughters report patient has very poor positioning at baseline with significant anterior loss with all PO as well as poor vision aftecting PO intake. Do not wish for reeval or instrumental at this time. Family wishes to continued PO diet as is. verbalized understanding of aspiration risks after long discussion. SLP to sign off  -AV      Anticipated Dischage Disposition  unknown  -AV      Recorded by [AV] Petra Scott, MS CCC-SLP 04/07/19 2130        User Key  (r) = Recorded By, (t) = Taken By, (c) = Cosigned By    Initials Name Effective Dates Discipline    AV Petra Scott, MS CCC-SLP 04/03/18 -  SLP    Julia Novoa, RN 06/16/16 -  Nurse          Outcome Summary  Outcome Summary/Treatment Plan (SLP)  Plan for Continued Treatment (SLP):  Long discussion with two daughters about eval, prognosis, poor positioning, history of dysphagia and patient wishes.  After discussing risks for aspiration, daughters verbalized understanding.  MD entered room and reinforced patient does not want enteral feeding or modified diet of any kind. DAughters report patient has very poor positioning at baseline with significant anterior loss with all PO as well as poor vision aftecting PO intake. Do not wish for reeval or instrumental at this time. Family wishes to continued PO diet as is. verbalized understanding of aspiration risks after long discussion. SLP to sign off (04/07/19 1030 : Petra Scott, MS CCC-SLP)  Anticipated Dischage Disposition: unknown (04/07/19 1030 : Petra Scott, MS CCC-SLP)          EDUCATION  The patient has been educated in the following areas:   Dysphagia (Swallowing Impairment) Oral Care/Hydration.    SLP Recommendation and Plan        Plan for Continued Treatment (SLP): Long discussion with two daughters about eval, prognosis, poor positioning, history of dysphagia and patient wishes.  After discussing risks for aspiration, daughters verbalized understanding.  MD entered room and reinforced patient does not want enteral feeding or modified diet of any kind. DAughters report patient has very poor positioning at baseline with significant anterior loss with all PO as well as poor vision aftecting PO intake. Do not wish for reeval or instrumental at this time. Family wishes to continued PO diet as is. verbalized understanding of aspiration risks after long discussion. SLP to sign off  Anticipated Dischage Disposition: unknown                    Time Calculation:   Time Calculation- SLP     Row Name 04/07/19 1453             Time Calculation- SLP    SLP Start Time  1030  -AV      SLP Received On  04/07/19  -AV        User Key  (r) = Recorded By, (t) = Taken By, (c) = Cosigned By    Initials Name Provider Type    AV Petra Scott, MS  CCC-SLP Speech and Language Pathologist          Therapy Charges for Today     Code Description Service Date Service Provider Modifiers Qty    [id number] HC ST TREATMENT SWALLOW 3 4/7/2019 Petra Scott, MS CCC-SLP GN 1                 Petra Scott MS CCC-SLP  4/7/2019

## 2019-04-07 NOTE — CONSULTS
Maryann Guillaume MD  Consulting physician: Bean    Chief Complaint   Patient presents with   • Respiratory Distress         HPI: Patient is an 84-year-old female brought in hospital after suffering a fall.  Patient has been found to have a traumatic pneumothorax as well as either pneumonia or a pleural contusion.  Patient has been complaining about chest pain after suffering her fall.  Does feel that the current regimen is helping her pain and controlling it sufficiently.      Past Medical History:   Diagnosis Date   • Atrial fibrillation (CMS/HCC)    • CAD (coronary artery disease)     s/p cabg   • Chronic anticoagulation    • Diabetes mellitus (CMS/HCC)     type II    • H/O hemiparesis     Rt sided    • Heart murmur     RHF as child.  no recent echos (pt declines)   • Hyperlipidemia    • Hypertension    • Hypothyroid    • Rheumatic heart disease     as a child, heart murmur, no recent echos (pt. declines)   • Stroke (CMS/HCC)     20+ years ago in OR during CABG      Past Surgical History:   Procedure Laterality Date   • APPENDECTOMY     • BREAST SURGERY      no breast tissue, never had tara, mel. breast implants   • CORONARY ARTERY BYPASS GRAFT  1994    CVA  and a fib post-op - on coumadin since   • TOTAL HIP ARTHROPLASTY REVISION Right 1998     Current Code Status     Date Active Code Status Order ID Comments User Context       4/5/2019 09:36 No CPR [id number]  Magdi Wood MD Inpatient       Questions for Current Code Status     Question Answer Comment    Code Status No CPR     Medical Interventions (Level of Support Prior to Arrest) Limited     Limited Support to NOT Include Intubation         Current Facility-Administered Medications   Medication Dose Route Frequency Provider Last Rate Last Dose   • acetaminophen (TYLENOL) tablet 650 mg  650 mg Oral Q4H PRN Uri Poole APRN   650 mg at 04/05/19 1219    Or   • acetaminophen (TYLENOL) suppository 650 mg  650 mg Rectal Q4H PRN Zulema  JAY Becerra       • aspirin chewable tablet 81 mg  81 mg Oral Daily Magdi Wood MD   81 mg at 04/07/19 0820   • atorvastatin (LIPITOR) tablet 40 mg  40 mg Oral Nightly Magdi Wood MD   40 mg at 04/06/19 2024   • aztreonam (AZACTAM) 2 g in sodium chloride 0.9 % 100 mL IVPB-MBP  2 g Intravenous Q8H Uri Poole APRN   2 g at 04/07/19 0656   • castor oil-balsam peru (VENELEX) ointment   Topical Q12H Milly Hdz MD       • clindamycin (CLEOCIN) 600 mg in dextrose 5% 50 mL IVPB (premix)  600 mg Intravenous Q8H Marlen Mendez, [facility]   600 mg at 04/07/19 0818   • dextrose (D50W) 25 g/ 50mL Intravenous Solution 25 g  25 g Intravenous Q15 Min PRN Uri Poole APRN       • dextrose (GLUTOSE) oral gel 15 g  15 g Oral Q15 Min PRN Uri Poole APRN       • digoxin (LANOXIN) tablet 250 mcg  250 mcg Oral Daily Magdi Wood MD   250 mcg at 04/07/19 0820   • enoxaparin (LOVENOX) syringe 30 mg  30 mg Subcutaneous Q24H Magdi Wood MD   30 mg at 04/06/19 1505   • [START ON 4/8/2019] famotidine (PEPCID) tablet 20 mg  20 mg Oral Daily Magdi Wood MD       • fentaNYL citrate (PF) (SUBLIMAZE) injection 25 mcg  25 mcg Intravenous Q1H PRN Uri Poole APRN   25 mcg at 04/07/19 1004   • glucagon (human recombinant) (GLUCAGEN DIAGNOSTIC) injection 1 mg  1 mg Subcutaneous PRN Uri Poole APRN       • HYDROcodone-acetaminophen (NORCO) 5-325 MG per tablet 1 tablet  1 tablet Oral Q8H Russell Bridges DO       • insulin lispro (humaLOG) injection 0-9 Units  0-9 Units Subcutaneous 4x Daily With Meals & Nightly Uri Poole APRN   2 Units at 04/07/19 1204   • ipratropium-albuterol (DUO-NEB) nebulizer solution 3 mL  3 mL Nebulization Q4H PRN Uri Poole APRN       • ipratropium-albuterol (DUO-NEB) nebulizer solution 3 mL  3 mL Nebulization 4x Daily - RT Uri Poole APRN   3 mL at 04/07/19 0714   •  magnesium sulfate 4 gram infusion - Mg less than or equal to 1mg/dL  4 g Intravenous PRN Magdi Wood MD 25 mL/hr at 04/05/19 1314 4 g at 04/05/19 1314    Or   • magnesium sulfate 3 gram infusion (1gm x 3) - Mg 1.1 - 1.5 mg/dL  1 g Intravenous PRN Magdi Wood MD        Or   • Magnesium Sulfate 2 gram infusion- Mg 1.6 - 1.9 mg/dL  2 g Intravenous PRN Magdi Wood MD 25 mL/hr at 04/07/19 0819 2 g at 04/07/19 0819   • metoprolol succinate XL (TOPROL-XL) 24 hr tablet 100 mg  100 mg Oral Daily Magdi Wood MD       • multivitamin with minerals 1 tablet  1 tablet Oral Daily Magdi Wood MD   1 tablet at 04/07/19 0819   • ondansetron (ZOFRAN) injection 4 mg  4 mg Intravenous Q6H PRN Uri Poole APRN       • polyethylene glycol 3350 powder (packet)  17 g Oral BID Magdi Wood MD   Stopped at 04/07/19 0817   • senna (SENOKOT) tablet 17.2 mg  2 tablet Oral Nightly PRN Magdi Wood MD       • sodium chloride 0.9 % flush 10 mL  10 mL Intravenous PRN Kash Gee,        • sodium chloride 0.9 % flush 3 mL  3 mL Intravenous Q12H Uri Poole APRN   3 mL at 04/06/19 2025   • sodium chloride 0.9 % flush 3-10 mL  3-10 mL Intravenous PRN Uri Poole APRN       • traZODone (DESYREL) tablet 50 mg  50 mg Oral Nightly PRN Magdi Wood MD            •  acetaminophen **OR** acetaminophen  •  dextrose  •  dextrose  •  fentaNYL citrate (PF)  •  glucagon (human recombinant)  •  ipratropium-albuterol  •  magnesium sulfate **OR** magnesium sulfate in D5W 1g/100mL (PREMIX) **OR** magnesium sulfate  •  ondansetron  •  senna  •  sodium chloride  •  sodium chloride  •  traZODone  Allergies   Allergen Reactions   • Cephalosporins      adulthood   • Morphine And Related      unknown   • Penicillins      Penicillin G Potassium     Family History   Problem Relation Age of Onset   • Stomach cancer  "Mother    • Lung cancer Father    • Diabetes Brother    • Heart disease Brother    • No Known Problems Daughter    • No Known Problems Son    • No Known Problems Daughter      Social History     Socioeconomic History   • Marital status:      Spouse name: Not on file   • Number of children: Not on file   • Years of education: Not on file   • Highest education level: Not on file   Tobacco Use   • Smoking status: Former Smoker     Packs/day: 1.00     Years: 31.00     Pack years: 31.00     Types: Cigarettes     Start date: 1962     Last attempt to quit: 1993     Years since quittin.2   • Smokeless tobacco: Never Used   • Tobacco comment: quit after stroke.   Substance and Sexual Activity   • Alcohol use: No   • Drug use: No   • Sexual activity: Defer   Social History Narrative    Hx of CVA in  during CABG surgery with Rt hemiparesis and afib on coumadin since.  Elderly, frail and up in W/c.  Lives with       Review of Systems -all other reviewed and found negative except as mentioned in the HPI      /50   Pulse 96   Temp 99.7 °F (37.6 °C) (Axillary)   Resp 20   Ht 177.8 cm (70\")   Wt 49.9 kg (110 lb)   SpO2 97%   BMI 15.78 kg/m²     Intake/Output Summary (Last 24 hours) at 2019 1207  Last data filed at 2019 0817  Gross per 24 hour   Intake 393.7 ml   Output 600 ml   Net -206.3 ml     Physical Exam:      General Appearance:    Alert, cooperative, in no acute distress, frail appearing   Head:    Normocephalic, without obvious abnormality, atraumatic   Eyes:            Lids and lashes normal, conjunctivae and sclerae normal, no   icterus, no pallor, corneas clear, PERRLA   Ears:    Ears appear intact with no abnormalities noted   Throat:   No oral lesions, no thrush, oral mucosa moist   Neck:   No adenopathy, supple, trachea midline, no thyromegaly, no     carotid bruit, no JVD   Back:     No kyphosis present, no scoliosis present, no skin lesions,       erythema or " scars, no tenderness to percussion or                   palpation,   range of motion normal   Lungs:     Clear to auscultation,respirations regular, even and                   unlabored    Heart:    Regular rhythm and normal rate, normal S1 and S2, no            murmur, no gallop, no rub, no click   Breast Exam:    Deferred   Abdomen:     Normal bowel sounds, no masses, no organomegaly, soft        non-tender, non-distended, no guarding, no rebound                 tenderness   Genitalia:    Deferred   Extremities:   Moves all extremities well, no edema, no cyanosis, no              redness   Pulses:   Pulses palpable and equal bilaterally   Skin:   No bleeding, bruising or rash   Lymph nodes:   No palpable adenopathy   Neurologic:   Cranial nerves 2 - 12 grossly intact, sensation intact, DTR        present and equal bilaterally       Results from last 7 days   Lab Units 04/07/19  0330   WBC 10*3/mm3 12.46*   HEMOGLOBIN g/dL 9.9*   HEMATOCRIT % 30.5*   PLATELETS 10*3/mm3 238     Results from last 7 days   Lab Units 04/07/19  0400   SODIUM mmol/L 138   POTASSIUM mmol/L 3.8   CHLORIDE mmol/L 106   CO2 mmol/L 27.0   BUN mg/dL 32*   CREATININE mg/dL 0.93   CALCIUM mg/dL 8.1*   BILIRUBIN mg/dL 0.6   ALK PHOS U/L 54   ALT (SGPT) U/L 51*   AST (SGOT) U/L 127*   GLUCOSE mg/dL 134*     Results from last 7 days   Lab Units 04/07/19  0400   SODIUM mmol/L 138   POTASSIUM mmol/L 3.8   CHLORIDE mmol/L 106   CO2 mmol/L 27.0   BUN mg/dL 32*   CREATININE mg/dL 0.93   GLUCOSE mg/dL 134*   CALCIUM mg/dL 8.1*     Imaging Results (last 72 hours)     Procedure Component Value Units Date/Time    XR Chest 1 View [[id number]] Updated:  04/07/19 0334    XR Chest 1 View [[id number]] Collected:  04/06/19 1159     Updated:  04/06/19 2300    Narrative:          EXAMINATION: XR CHEST 1 VW - 04/06/2019     INDICATION: J96.21-Acute and chronic respiratory failure with hypoxia;  J96.22-Acute and chronic respiratory failure with  hypercapnia;  J93.9-Pneumothorax, unspecified; R91.8-Other nonspecific abnormal  finding of lung field; S09.90XA-Unspecified injury of head, initial  encounter; S00.01XA-Abrasion of scalp, initial encounter; Z79.01-Long  term (current) use of anticoagulants.     COMPARISON: 04/05/2019     FINDINGS: There is improved aeration of the right lung although  significant interstitial disease remains. Only mild interstitial changes  are seen on the left. No pneumothorax or significant effusion is  identified. The heart remains enlarged.           Impression:       Improving aeration of the right lung. No new chest disease  is seen.     DICTATED:   04/06/2019  EDITED/ls :   04/06/2019      This report was finalized on 4/6/2019 10:57 PM by DR. Maximilian Green MD.       XR Chest 1 View [[id number]] Collected:  04/05/19 0948     Updated:  04/05/19 1150    Narrative:       EXAMINATION: XR CHEST 1 VW-04/05/2019:      INDICATION: Evaluate R PTX; J96.21-Acute and chronic respiratory failure  with hypoxia; J96.22-Acute and chronic respiratory failure with  hypercapnia; J93.9-Pneumothorax, unspecified; R91.8-Other nonspecific  abnormal finding of lung field; S09.90XA-Unspecified injury of head,  initial encounter; S00.01XA-Abrasion of scalp, initial encounter;  Z79.01-Long term (current) use of anticoagulants; Z86.79-Personal  history.     TECHNIQUE:  Single view frontal chest.     COMPARISONS: 04/04/2019.     FINDINGS:  Minimal residual if any pneumothorax is seen at the right  lung apex. Extensive airspace disease in the right lung has increased.  Some of the acute right rib fractures are visible. Patchy opacities are  seen in the left lung as well. Cardiomegaly and sternotomy wires.       Impression:       Minimal residual if any pneumothorax is seen at the right  lung apex. Increasing right lung airspace disease.     D:  04/05/2019  E:  04/05/2019     This report was finalized on 4/5/2019 11:47 AM by Mann Neves.       XR Hand 3+ View  Left [[id number]] Collected:  04/05/19 1027     Updated:  04/05/19 1149    Narrative:       EXAMINATION: XR HAND 3+ VW LEFT- 04/05/2019      INDICATION: r/o 5th digit fracture; J96.21-Acute and chronic respiratory  failure with hypoxia; J96.22-Acute and chronic respiratory failure with  hypercapnia; J93.9-Pneumothorax, unspecified; R91.8-Other nonspecific  abnormal finding of lung field; S09.90XA-Unspecified injury of head,  initial encounter; S00.01XA-Abrasion of scalp, initial encounter;  Z79.01-Long term (current) use of anticoagulants; Z86.79-Pers     TECHNIQUE:  3 views left hand     COMPARISONS:  None.     FINDINGS:  Background osteopenia. There is a nondisplaced fracture at  the base of the fifth ray proximal phalanx with extension into the  metacarpophalangeal joint. There is associated soft tissue swelling.  There is no radiodense foreign body. There is additional advanced  degenerative change at the first and second carpometacarpal joints.       Impression:       Intra-articular fracture involving the base of the fifth  ray proximal phalanx. This is superimposed on osteopenia and  degenerative change.     D:  04/05/2019  E:  04/05/2019        This report was finalized on 4/5/2019 11:46 AM by Mann Neves.       CT Chest Without Contrast [[id number]] Collected:  04/04/19 2348     Updated:  04/04/19 2350    Narrative:       CT Chest WO    INDICATION:   Mixed acute and chronic respiratory failure with hypoxia and hypercapnia. Right-sided pneumothorax identified on chest x-ray earlier in the evening.    TECHNIQUE:   CT of the thorax without contrast. Coronal and sagittal reconstructions were obtained.  Radiation dose reduction techniques included automated exposure control or exposure modulation based on body size.     COUNT OF KNOWN CT AND CARDIAC NUC MED STUDIES PERFORMED IN PREVIOUS 12 MONTHS: 0.      COMPARISON:   2/4/2018    FINDINGS:  Chest images at mediastinal window show cardiomegaly. Atherosclerotic  changes are seen in the aorta. The ascending aorta shows fusiform dilatation to a maximum diameter of 3.7 cm.    Chest images at lung window show a sliver of pneumothorax on the right. This is similar to the chest x-ray from earlier in the evening. There is extensive predominantly groundglass infiltrate in the right upper lobe. The more dependent portion of the  right upper lobe demonstrates reticulonodular infiltrate and mild alveolar consolidation. There is a small right pleural effusion. There is also dense reticulonodular infiltrate in the right lower lobe in its midportion and there is dense consolidation  at the right lung base. The left lung demonstrates patchy groundglass infiltrate in both the upper and lower lobes with mild volume loss in the left lower lobe. These infiltrates are all new since the previous scan in February. There is a small bleb at  the right lung base posterior medially that was not seen on the previous CT. There are fractures of the right ribs posteriorly. The ninth, 10th and 11th ribs are fractured. This is likely the source of the pneumothorax. Healed rib fractures are seen on  the left.      Impression:       Multiple posterior rib fractures on the right. This is probably the source of the pneumothorax. The bilateral lung infiltrates are nonspecific. Pulmonary hemorrhage could certainly be a consideration in a patient with traumatic pneumothorax. Aspiration  pneumonitis could also account for these changes. Infectious pneumonia should be considered as well.    Signer Name: Chai Medrano MD   Signed: 4/4/2019 11:48 PM   Workstation Name: [id number]       CT Cervical Spine Without Contrast [[id number]] Collected:  04/04/19 2339     Updated:  04/04/19 2341    Narrative:       CT Spine Cervical WO    INDICATION:   Patient fell prior to arrival.    TECHNIQUE:   CT of the cervical spine without contrast. Coronal and sagittal reconstructions were obtained.  Radiation dose reduction  techniques included automated exposure control or exposure modulation based on body size.     COUNT OF KNOWN CT AND CARDIAC NUC MED STUDIES PERFORMED IN PREVIOUS 12 MONTHS: 0.      COMPARISON:  None available.    FINDINGS:  Moderate degenerative changes are seen at all cervical disks. Disc space narrowing is most prominent at C6-7. There is no significant posterior osteophyte formation. Posterior facet arthropathy is noted to a greater extent on the right than on the left  especially from C2 to C5. No cervical fractures are seen. There are no interlocked facets. No destructive bone lesions are seen.      Impression:       Cervical degenerative disc and facet disease. No acute findings. No evidence of fracture.    Signer Name: Chai Medrano MD   Signed: 4/4/2019 11:39 PM   Workstation Name: [id number]       CT Head Without Contrast [[id number]] Collected:  04/04/19 2337     Updated:  04/04/19 2340    Narrative:       CT Head WO    HISTORY:   Current anticoagulation usage. Head injury with scalp abrasion. Patient fell prior to arrival.    TECHNIQUE:   Axial unenhanced head CT. Radiation dose reduction techniques included automated exposure control or exposure modulation based on body size.     COUNT OF KNOWN CT AND CARDIAC NUC MED STUDIES PERFORMED IN PREVIOUS 12 MONTHS: 0.      COMPARISON:   None.    FINDINGS:   Generalized atrophy is seen. There is a chronic infarct in the left parietal lobe. There is no evidence of mass lesion, hemorrhage or edema. No acute findings. Extra-axial structures are unremarkable.      Impression:       Atrophy with a large chronic infarct in the left parietal lobe. No acute findings..          Signer Name: Chai Medrano MD   Signed: 4/4/2019 11:37 PM   Workstation Name: [id number]       XR Chest 1 View [[id number]] Collected:  04/04/19 2157     Updated:  04/04/19 2159    Narrative:       CR Chest 1 Vw    INDICATION:   Short of air     COMPARISON:    To 518    FINDINGS:  Single portable  AP view of the chest.  Emphysema and cardiomegaly are redemonstrated. There is extensive interstitial infiltrate throughout the right lung, and there is a small right apical pneumothorax.      Impression:       Extensive right lung interstitial infiltrate superimposed on emphysema, small right pneumothorax.    NOTIFICATION: Critical Value/emergent results were called by telephone at the time of interpretation on 4/4/2019 9:53 PM  to  Kash Gee MD who verbally acknowledged these results.    Signer Name: Kane Van MD   Signed: 4/4/2019 9:57 PM   Workstation Name: [id number]           Impression: Fall  Dysphagia  M/S pain-ribs  GOC      Plan: At this point time the family is hoping that the patient will qualify to go to [facility] for rehab.  If she does not qualify they are interested in looking at another rehab facility.  In respect to the patient's symptoms I am going to start patient on some scheduled Norco, as she has tolerated Norco in the past.  I will continue the patient's as needed fentanyl as well.        Russell Bridges,   04/07/19  12:07 PM

## 2019-04-07 NOTE — PLAN OF CARE
Problem: Patient Care Overview  Goal: Interprofessional Rounds/Family Conf  Outcome: Ongoing (interventions implemented as appropriate)   04/07/19 1423   Interdisciplinary Rounds/Family Conf   Summary Palliative consult for GOC/ACP, symptom management, support for patient and family. Low-dose Norco scheduled for pain management d/t rib fractures. Monitor bowel status with opioid therapy. 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 Daughters have been present this week, but return to work tomorrow and will be less able to visit as frequently; have requested staff assist pt with meals. Pt has R-side weakness, s/p remote CVA. Pt is cognizant, alert, oriented X 4, able to make her wishes and needs known. Daughters are hopeful that pt will qualify for rehab at [facility] with return to [facility]; discussed that with high risk for fall and fractures, evidence of dysphagia and aspiration, pt may require higher level of care when reaches optimal recovery from this event. Encouraged daughters to investigate facilities with multi-level care available. Palliative following for continuing sx mgt and ongoing GOC discussion.       Problem: Palliative Care (Adult)  Intervention: Minimize Discomfort   04/07/19 1423   Promote Oxygenation/Perfusion   Pain Management Interventions pain management plan reviewed with patient/caregiver     Intervention: Optimize Function   04/07/19 1423   Musculoskeletal Interventions   Fatigue Management frequent rest breaks encouraged;paced activity encouraged     Intervention: Promote Informed Decision Making and Goal Setting   04/07/19 1423   Coping/Psychosocial Interventions   Life Transition/Adjustment palliative care discussed;palliative care initiated     Intervention: Support/Optimize Psychosocial Response   04/07/19 1423   Psychosocial Support   Family/Support System Care caregiver stress acknowledged;self-care encouraged;support  provided;involvement promoted       Goal: Identify Related Risk Factors and Signs and Symptoms  Outcome: Ongoing (interventions implemented as appropriate)   19 1423   Palliative Care (Adult)   Palliative Care: Related Risk Factors advanced age;chronic illness;condition is progressive;injury (severe and disabling);other (see comments)  (spouse  3 months ago)   Palliative Care: Signs and Symptoms caregiver strain;fatigue;pain     Goal: Maximized Comfort  Outcome: Ongoing (interventions implemented as appropriate)   19   Palliative Care (Adult)   Maximized Comfort making progress toward outcome  (scheduled Norco for pain management)     Goal: Enhanced Quality of Life  Outcome: Ongoing (interventions implemented as appropriate)   19   Palliative Care (Adult)   Enhanced Quality of Life making progress toward outcome

## 2019-04-07 NOTE — PLAN OF CARE
Problem: Patient Care Overview  Goal: Plan of Care Review  Outcome: Ongoing (interventions implemented as appropriate)   04/07/19 4516   Coping/Psychosocial   Plan of Care Reviewed With patient;daughter   Plan of Care Review   Progress no change   SLP caregiver instruction completed. Will sign-off dysphagia. Please see note for further details and recommendations.

## 2019-04-07 NOTE — PLAN OF CARE
Problem: Fall Risk (Adult)  Goal: Identify Related Risk Factors and Signs and Symptoms  Outcome: Ongoing (interventions implemented as appropriate)      Problem: Skin Injury Risk (Adult)  Goal: Identify Related Risk Factors and Signs and Symptoms  Outcome: Ongoing (interventions implemented as appropriate)      Problem: Patient Care Overview  Goal: Plan of Care Review  Outcome: Ongoing (interventions implemented as appropriate)   04/07/19 1605   OTHER   04/07/19 1605   OTHER   Outcome Summary patient now on 4LNC, recieving scheduled Norco rather than frequent IV Fentanyl pushes. Patient is continueing antibiotic therapy with the removal of flagyl and addiition of clindamycin.Patient and family consulted with pallitive.   Coping/Psychosocial   Plan of Care Reviewed With patient;daughter;family   Coping/Psychosocial   Patient Agreement with Plan of Care agrees   Plan of Care Review   Progress improving      Outcome Summary patient now on 4LNC, recieving scheduled Norco rather than frequent IV Fentanyl pushes. Patient is continueing antibiotic therapy with the removal of flagyl and addiition of clindamycin.   Coping/Psychosocial   Plan of Care Reviewed With patient;daughter;family   Coping/Psychosocial   Patient Agreement with Plan of Care agrees   Plan of Care Review   Progress improving       Problem: Palliative Care (Adult)  Goal: Enhanced Quality of Life  Outcome: Ongoing (interventions implemented as appropriate)

## 2019-04-07 NOTE — PLAN OF CARE
Problem: Fall Risk (Adult)  Goal: Identify Related Risk Factors and Signs and Symptoms  Outcome: Ongoing (interventions implemented as appropriate)   04/07/19 0447   Fall Risk (Adult)   Related Risk Factors (Fall Risk) age-related changes;fatigue/slow reaction;gait/mobility problems;history of falls;impaired vision;sensory deficits;environment unfamiliar   Signs and Symptoms (Fall Risk) presence of risk factors     Goal: Absence of Fall  Outcome: Ongoing (interventions implemented as appropriate)   04/07/19 0447   Fall Risk (Adult)   Absence of Fall making progress toward outcome       Problem: Skin Injury Risk (Adult)  Goal: Identify Related Risk Factors and Signs and Symptoms  Outcome: Ongoing (interventions implemented as appropriate)   04/07/19 0447   Skin Injury Risk (Adult)   Related Risk Factors (Skin Injury Risk) advanced age;critical care admission;infection;medical devices;mobility impaired;tissue perfusion altered;nutritional deficiencies     Goal: Skin Health and Integrity  Outcome: Ongoing (interventions implemented as appropriate)   04/07/19 0447   Skin Injury Risk (Adult)   Skin Health and Integrity making progress toward outcome       Problem: Patient Care Overview  Goal: Plan of Care Review  Outcome: Ongoing (interventions implemented as appropriate)   04/07/19 0447   OTHER   Outcome Summary PT remains on high flow NC O2 all shift. Requested Fentanyl approx. 3 times during shift due to generalized pain/rib pain. Pt slept the majority of shift. VSS. Will continue to monitor.    Coping/Psychosocial   Plan of Care Reviewed With patient;daughter   Coping/Psychosocial   Patient Agreement with Plan of Care agrees   Plan of Care Review   Progress no change

## 2019-04-07 NOTE — PROGRESS NOTES
Continued Stay Note   Carmela     Patient Name: Kristin Magdaleno  MRN: [id number]  Today's Date: 4/7/2019    Admit Date: 4/4/2019    Discharge Plan     Row Name 04/07/19 1434       Plan    Plan  update    Patient/Family in Agreement with Plan  yes    Plan Comments  CM spoke w family re: placement requests. Pt will need rehab for PT prior to returning to  according to RN and family.  Pt family would like [facility] if pt is able. they are reviewing other SNF choices today and will speak w CM on Monday.  However, both daughters work as teachers and are harder to reach during the day.  they have requested that CM contact the school directly at [phone] and ask to speak to Sara . or Wen SAXENA.  If this does not work then they have requested the CM contact [employer] group cell and leave a msg w a call back number - [phone].  Pt is Bline and uses her L arm only. Her IV is in her L arm and she is now unable to conrado this for basic needs.  Family voiced thier concerns re: this. Family has requested that if pt is moved to floor she has a bed besides the nursing station, note left for MD. CM will continue to monitor and assist w d/c placemwent.        Discharge Codes    No documentation.             Kait Lunsford RN

## 2019-04-07 NOTE — PROGRESS NOTES
Intensivist Note     2019  Hospital Day: 3  * No surgery found *      Ms. Kristin Magdaleno, 84 y.o. female is followed for:    Acute on chronic hypoxemic/hypercarbic respiratory failure not requiring ventilatory support. 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  Wheelchair-bound    CAD status post CABG    Atrial fibrillation; on Coumadin, Digoxin, Metoprolol    History of rheumatic heart disease. Data deficit. No echoes    Essential hypertension, benign    Pure hypercholesterolemia    Diabetes mellitus without complication (CMS/[facility])       SUBJECTIVE     84-year-old white female resident of [facility] with an extensive past medical history including CAD status post CABG, remote CVA (20 years ago) with right hemiparesis, atrial fibrillation on chronic Coumadin, diabetes mellitus, hypertension, hyperlipidemia, severe progressive cervical kyphosis, ocular degeneration with blindness, and hypothyroidism.  She had been living at home with her  ever since her CABG in  when she went into atrial fibrillation and suffered a CVA and residual total right hemiparesis.  Although living at home has been wheelchair-bound since that time and quite debilitated.  In fact had a hospitalization in 2018 due to a fall and multiple left rib fractures.  Unfortunately her  recently  and the patient had to enter assisted living.  Daughter has noted progressive cervical kyphosis with her head flexed forward.  On 19 she suffered a fall while trying to get off the toilet.  Subsequently became very dyspneic and tachypneic complaining of right-sided pleuritic chest pain.  EMS noted her O2 sats to be in the 60s and brought her to [facility] ER.  ABGs on BiPAP with an FiO2 of  60% revealed a pH of 7.25, PCO2 of 54, and a PO2 of 93.  She had multiple right rib fractures and a tiny right apical pneumothorax that did not require chest tube placement.  She made it clear that she did not want heroic measures such as CPR or intubation.  Her Coumadin was reversed and it has been decided that the risk of Coumadin after two severe falls is higher than the risk of CVA off anticoagulant therapy.    Patient has known CAD and is suffered a CABG in the past as noted above.  Her LV function is unknown but on admission her BNP was only 297 and troponin normal.  EKG revealed a left bundle branch block and left axis with a sinus tachycardia but no acute changes.  She did not receive any excessive fluids and yet her BNP spontaneously increased from 297 to 1875 without any evidence of volume overload or congestive heart failure.  Family and patient did not wish any further evaluation because her BUN has increased some we have not diuresed the patient.     Today she is no longer requiring BiPAP or high flow nasal oxygen and we have been able to gradually wean her FiO2  down to 4 L with oxygen saturations of 94%. She has been evaluated by speech and they have recommended placing her on a specialized diet to avoid what they feel is significant dysphagia.  Patient and her family however continue to refuse a special diet and wish to continue with a standard comfort diet. This is felt reasonable in view of her DNR status. The patient has had no fever, and WBC has decreased from 17.0 to 12.46 today.  She continues on clindamycin and aztreonam started because of right-sided parenchymal infiltrates and leukocytosis due to the possibility of aspiration. It is just as likely however that this was a lung contusion in view of her multiple rib fractures.   Chest x-ray today shows some persistent bibasilar atelectasis but a right-sided infiltrates continue to clear.      The patient's relevant past medical, surgical and  "social history were reviewed and updated in Epic as appropriate.    OBJECTIVE     /51 (BP Location: Right arm, Patient Position: Lying)   Pulse 109   Temp 98.9 °F (37.2 °C) (Oral)   Resp 16   Ht 177.8 cm (70\")   Wt 49.9 kg (110 lb)   SpO2 96%   BMI 15.78 kg/m²   Flow (L/min): 4    Flowsheet Rows      First Filed Value   Admission Height  177.8 cm (70\") Documented at 04/04/2019 2109   Admission Weight  49.9 kg (110 lb) Documented at 04/04/2019 2109        Intake & Output (last day)       04/07 0701 - 04/08 0700    I.V. (mL/kg) 100 (2)    IV Piggyback 50    Total Intake(mL/kg) 150 (3)    Urine (mL/kg/hr) 250 (0.4)    Total Output 250    Net -100         Urine Unmeasured Occurrence 1 x          Exam:  General Exam:  Elderly debilitated slightly lethargic white female sleeping in a chair.  HEENT: Head is flexed forward anteriorly on her chest as always due to severe kyphosis.  Pupils equal and reactive. Nose and throat clear.  Neck:                          Supple, no JVD, thyromegaly, or adenopathy  Lungs: Diminished breath sounds at the bases  Cardiovascular: Regular rate and rhythm without murmurs or gallops.  Abdomen: Soft nontender without organomegaly or masses.   and rectal: Appear weak catheter in place  Extremities: No cyanosis clubbing edema.  Neurologic:                 Diffusely weak.  Unable to walk and wheelchair bound.  Sleeping while the family and I talked    Chest X-Ray: Poor inspiration with some bibasilar atelectasis.  Multiple right rib fractures.  Right-sided infiltrates appear to be clearing although there is some diffuse increase in interstitial markings.      Results from last 7 days   Lab Units 04/07/19  0330 04/06/19  0543 04/05/19  0409   WBC 10*3/mm3 12.46* 13.26* 17.83*   HEMOGLOBIN g/dL 9.9* 10.4* 12.4   HEMATOCRIT % 30.5* 33.0* 40.8   PLATELETS 10*3/mm3 238 254 295     Results from last 7 days   Lab Units 04/07/19  0400 04/06/19  0543   SODIUM mmol/L 138 139   POTASSIUM " mmol/L 3.8 3.9   CHLORIDE mmol/L 106 107   CO2 mmol/L 27.0 24.0   BUN mg/dL 32* 28*   CREATININE mg/dL 0.93 0.90   GLUCOSE mg/dL 134* 179*   CALCIUM mg/dL 8.1* 8.1*     Results from last 7 days   Lab Units 04/07/19  0400 04/06/19  0543 04/05/19  0409   MAGNESIUM mg/dL 1.9 2.1 1.7   PHOSPHORUS mg/dL 2.6 3.6 4.8     Results from last 7 days   Lab Units 04/07/19  0400 04/06/19  0543 04/04/19 2128   ALK PHOS U/L 54 58 75   BILIRUBIN mg/dL 0.6 0.5 0.3   ALT (SGPT) U/L 51* 62* 96*   AST (SGOT) U/L 127* 152* 95*       No results found for: SEDRATE  Lab Results   Component Value Date    BNP 1,957.0 (H) 04/07/2019     No results found for: CKTOTAL, CKMB, CKMBINDEX, TROPONINI, TROPONINT  Lab Results   Component Value Date    TSH 2.666 04/04/2019     No results found for: LACTATE  No results found for: CORTISOL    Results from last 7 days   Lab Units 04/07/19  0357 04/05/19  0136 04/04/19  2143   PH, ARTERIAL pH units 7.446 7.259* 7.255*   PCO2, ARTERIAL mm Hg 43.2 60.6* 54.2*   PO2 ART mm Hg 76.8* 63.6* 93.6   HCO3 ART mmol/L 29.7* 27.1* 24.0   FIO2 % 35 55 60         Mode: BiPAP S/T (04/04/19 2127) PRN            I reviewed the patient's results, images and medication.    Assessment/Plan   ASSESSMENT        Acute on chronic hypoxemic/hypercarbic respiratory failure not requiring ventilatory support. 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  Wheelchair-bound    CAD status post CABG    Atrial fibrillation; on Coumadin, Digoxin, Metoprolol    History of rheumatic heart disease. Data deficit. No echoes    Essential hypertension, benign    Pure hypercholesterolemia    Diabetes mellitus without complication (CMS/HCC)      DISCUSSION: At this point in time she has probably reached maximal hospital benefit.   She has made clear she wishes to be a DNR. She does not wish further cardiac evaluation and I think that is reasonable.  While she did poorly on her dysphagia evaluation she wishes a comfort diet and that is also reasonable.  Main focus now is comfort care.  She has been wheelchair-bound for quite some time but the family wishes to try to get into [facility] rehab.  I doubt very seriously she can do 3 hours up rehab daily and most likely will be turned down by [facility].  If so the other option is to go to a nursing facility that will do some rehab.  Additional discussions will need to be had with the family to see if we can find something that is acceptable.  In the meantime I think she can be transferred to the floor. Long discussion with family.    PLAN     1.  Transfer to telemetry and asked the hospitalists to assume care  2.  Complete a 7-10-day course of antimicrobial therapy (she is pen allergic which makes it somewhat difficult to treat as an outpatient if we have any suspicions of aspiration-which we do)  3.  Consult [facility] and if they turn her down look for other options  4.  Soft cervical collar to see if it can help with the positioning of her head.      Plan of care and goals reviewed with mulitdisciplinary team at daily rounds.    I discussed the patient's findings and my recommendations with patient, family and nursing staff    Time spent Critical care 35 min (It does not include procedure time).    Pool Del Angel MD  Intensive Care Medicine  04/07/19 7:06 PM

## 2019-04-07 NOTE — PROGRESS NOTES
Intensivist Note     2019  Hospital Day: 2  * No surgery found *      Ms. Kristin Magdaleno, 84 y.o. 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  Wheelchair-bound    CAD status post CABG    Atrial fibrillation; on Coumadin, Digoxin, Metoprolol    History of rheumatic heart disease. Data deficit. No echoes    Essential hypertension, benign    Pure hypercholesterolemia    Diabetes mellitus without complication (CMS/HCC)       SUBJECTIVE     84-year-old white female resident of [facility] assisted [facility] with an extensive past medical history including CAD status post CABG, remote CVA (20 years ago) with right hemiparesis, atrial fibrillation on chronic Coumadin, diabetes mellitus, hypertension, hyperlipidemia, severe progressive cervical kyphosis, and hypothyroidism.  She had been living at home with her  ever since her CABG in  when she went into atrial fibrillation and suffered a CVA and residual total right hemiparesis.  Although living at home has been wheelchair-bound since that time and quite debilitated.  In fact had a hospitalization in 2018 due to a fall and multiple left rib fractures.  Unfortunately her  recently  and the patient had to enter assisted living.  Daughter has noted progressive cervical kyphosis with her head flexed forward.  On 19 she suffered a fall while trying to get off the toilet.  Subsequently became very dyspneic and tachypneic complaining of right-sided pleuritic chest pain.  EMS noted her O2 sats to be in the 60s and brought her to [facility] ER.  ABGs on BiPAP with an FiO2 of 60% revealed a pH of 7.25, PCO2 of 54, and a PO2 of 93.  She had multiple right  "rib fractures and a tiny right apical pneumothorax that did not require chest tube placement.  She made it clear that she did not want heroic measures such as CPR or intubation.  Her Coumadin was reversed and it has been decided that the risk of Coumadin after 2 falls is higher than the risk of CVA off anticoagulant therapy.    Today the patient continues to require high flow nasal oxygen but the FiO2 has been reduced to 35% and she does not look in distress.  She has been evaluated by speech and they have recommended placing her on a specialized diet to avoid what they feel is significant dysphagia.  Patient and her family however refuse a special diet and request to continue with standard meals excepting the risk for aspiration.  The patient has had no fever, and WBC has decreased from 17,000-13,000 today.  She remains on Flagyl and aztreonam which were started because of some right-sided parenchymal infiltrates and an associated leukocytosis.  It is unclear if these are truly a pneumonia due to aspiration, or if they are due to a pulmonary contusion.    The patient's relevant past medical, surgical and social history were reviewed and updated in Epic as appropriate.    OBJECTIVE     /49 (BP Location: Right arm, Patient Position: Lying)   Pulse 111   Temp (P) 98.4 °F (36.9 °C) (Axillary)   Resp 20   Ht 177.8 cm (70\")   Wt 49.9 kg (110 lb)   SpO2 97%   BMI 15.78 kg/m²   High flow nasal oxygen at an FiO2 of 35%    Flowsheet Rows      First Filed Value   Admission Height  177.8 cm (70\") Documented at 04/04/2019 2109   Admission Weight  49.9 kg (110 lb) Documented at 04/04/2019 2109        Intake & Output (last day)       04/06 0701 - 04/07 0700    I.V. (mL/kg) 0.7 (0)    IV Piggyback 164.4    Total Intake(mL/kg) 165.1 (3.3)    Urine (mL/kg/hr) 800 (1.1)    Total Output 800    Net -634.9               Exam:  General Exam:  Elderly white female with such as severe cervical kyphosis that her chin rests on " her chest.  HEENT: Alopecia.  Pupils equal and reactive. Nose and throat clear.  Neck:                          Supple, no JVD, thyromegaly, or adenopathy.  Profound cervical kyphosis as noted above  Lungs: Coarse rhonchi right greater than left.  Cardiovascular: RRR without murmurs or gallops.   bpm.  Sinus tachycardia on monitor  Abdomen: Soft nontender without organomegaly or masses.   and rectal: Deferred.  Extremities: No cyanosis clubbing edema.  Neurologic:                  Right hemiparesis.  Right upper extremity rigid.  Follows commands on the left.  Alert and oriented.  Clear speech    Chest X-Ray: Cardiomegaly.  Calcium in the aortic knob.  Right upper lobe infiltrate with some volume loss but improved when compared to yesterday's film.  Mild bibasilar atelectasis/infiltrate but no air bronchograms.  Calcified breast implants in place.      Results from last 7 days   Lab Units 04/06/19 0543 04/05/19 0409 04/04/19 2128   WBC 10*3/mm3 13.26* 17.83* 14.79*   HEMOGLOBIN g/dL 10.4* 12.4 12.6   HEMATOCRIT % 33.0* 40.8 41.4   PLATELETS 10*3/mm3 254 295 351     Results from last 7 days   Lab Units 04/06/19 0543 04/04/19 2128   SODIUM mmol/L 139 141   POTASSIUM mmol/L 3.9 4.6   CHLORIDE mmol/L 107 105   CO2 mmol/L 24.0 23.0   BUN mg/dL 28* 25*   CREATININE mg/dL 0.90 1.15   GLUCOSE mg/dL 179* 317*   CALCIUM mg/dL 8.1* 8.5*     Results from last 7 days   Lab Units 04/06/19  0543 04/05/19 0409   MAGNESIUM mg/dL 2.1 1.7   PHOSPHORUS mg/dL 3.6 4.8     Results from last 7 days   Lab Units 04/06/19  0543 04/04/19 2128   ALK PHOS U/L 58 75   BILIRUBIN mg/dL 0.5 0.3   ALT (SGPT) U/L 62* 96*   AST (SGOT) U/L 152* 95*       No results found for: SEDRATE  Lab Results   Component Value Date    BNP 1,875.0 (H) 04/06/2019     No results found for: CKTOTAL, CKMB, CKMBINDEX, TROPONINI, TROPONINT  Lab Results   Component Value Date    TSH 2.666 04/04/2019     No results found for: LACTATE  No results found for:  CORTISOL    Results from last 7 days   Lab Units 04/05/19  0136 04/04/19  2143   PH, ARTERIAL pH units 7.259* 7.255*   PCO2, ARTERIAL mm Hg 60.6* 54.2*   PO2 ART mm Hg 63.6* 93.6   HCO3 ART mmol/L 27.1* 24.0   FIO2 % 55 60       I reviewed the patient's results, images and medication.    Assessment/Plan   ASSESSMENT        Acute on chronic hypoxemic/hypercarbic respiratory failure (CMS/HCC)    Small right traumatic pneumothorax due to fall and multiple right rib fractures    Remote CVA with right hemiparesis.  20+ years (CMS/[facility])    Severe cervical kyphosis presumably due to osteoporosis and compression fractures    Failure to thrive.  Wheelchair-bound    CAD status post CABG    Atrial fibrillation; on Coumadin, Digoxin, Metoprolol    History of rheumatic heart disease. Data deficit. No echoes    Essential hypertension, benign    Pure hypercholesterolemia    Diabetes mellitus without complication (CMS/[facility])      DISCUSSION: It is unclear to me if she truly has a pneumonia (possibly due to aspiration) or if this is a lung contusion.  Is also unclear why her BNP increased so dramatically when she does not appear to have heart failure and has not been fluid overloaded.  (Possible cardiac contusion as well ?).  Whatever the case she is made it clear she just wants good general care.  We will do our best to avoid DVT and stress ulcers, treat her empirically for possible aspiration pneumonia, mobilize her as much as she can tolerate, and keep her comfortable.  She does not want aggressive care and I think that is quite reasonable in view of the severe state of her debility.    PLAN     1.  Change Flagyl to clindamycin.  I like the coverage better  2.  Continue aztreonam  3.  Try to avoid further diuresis as has had a adequate response to today's dose of Bumex.  4.  Follow-up BNP and chest x-ray tomorrow  5.  Agree that anticoagulation would be inappropriate in this debilitated individual  6.  Since the family has requested  DNR, and wished her to have comfort diet despite abnormal dysphagia evaluation and the risks of aspiration, I think it is time for a palliative care consult for goals of care     Plan of care and goals reviewed with mulitdisciplinary team at daily rounds.    I discussed the patient's findings and my recommendations with patient, family and nursing staff    Time spent Critical care 35 min (It does not include procedure time).    Pool Del Angel MD  Intensive Care Medicine  04/06/19 9:43 PM

## 2019-04-08 NOTE — PROGRESS NOTES
Continued Stay Note   Carmela     Patient Name: Kristin Magdaleno  MRN: [id number]  Today's Date: 4/8/2019    Admit Date: 4/4/2019    Discharge Plan     Row Name 04/08/19 1133       Plan    Plan  Update    Plan Comments  Referral was made to [facility] at [facility] (pt has been there in the past). Spoke with Sara (dtr) via phone her #1 choice is [facility]. CM gave the granddaughter a list of facilities in [facility] that she is going to give to Sara when she comes in..    Final Discharge Disposition Code  03 - skilled nursing facility (SNF)        Discharge Codes    No documentation.             Milly Wallace RN

## 2019-04-08 NOTE — PROGRESS NOTES
Clinical Nutrition Note      Patient Name: Kristin Magdaleno  MRN: [id number]  Admission date: 4/4/2019      Multidisciplinary Rounds    Additional information obtained during MDR:  RN reports pt stable on HFNC w/ good O2 sats; eating some ; drinking at least half of supplement at meals.  Pt is on a comfort diet ;SLP evaluated for aspiration; family refused dysphagia modified diet and instrumental evaluation.     Current diet: Diet Regular; Consistent Carbohydrate    Supplement: Boost Glucose Control  w/ meals    Pertinent medical data reviewed  Intake for 2 meals documented ; average consumption 38%    Intervention:  Plan of care and goals reviewed    Monitor:  RD to follow per protocol      Claudette Flores MS,RD,LD  04/08/19 3:49 PM  Time: 20 min

## 2019-04-08 NOTE — PLAN OF CARE
Problem: Fall Risk (Adult)  Goal: Identify Related Risk Factors and Signs and Symptoms  Outcome: Ongoing (interventions implemented as appropriate)      Problem: Skin Injury Risk (Adult)  Goal: Identify Related Risk Factors and Signs and Symptoms  Outcome: Ongoing (interventions implemented as appropriate)    Goal: Skin Health and Integrity  Outcome: Ongoing (interventions implemented as appropriate)      Problem: Patient Care Overview  Goal: Plan of Care Review  Outcome: Ongoing (interventions implemented as appropriate)   04/08/19 1615   OTHER   Outcome Summary Pt arrives to floor around 1530. Pt having no complaints on arrival. VSS. RN will continue to monitor.    Coping/Psychosocial   Plan of Care Reviewed With patient;family   Coping/Psychosocial   Patient Agreement with Plan of Care agrees   Plan of Care Review   Progress no change       Problem: Palliative Care (Adult)  Goal: Identify Related Risk Factors and Signs and Symptoms  Outcome: Ongoing (interventions implemented as appropriate)    Goal: Maximized Comfort  Outcome: Ongoing (interventions implemented as appropriate)    Goal: Enhanced Quality of Life  Outcome: Ongoing (interventions implemented as appropriate)

## 2019-04-08 NOTE — THERAPY TREATMENT NOTE
Acute Care - Physical Therapy Treatment Note  [facility]     Patient Name: Kristin Magdaleno  : 1935  MRN: [id number]  Today's Date: 2019  Onset of Illness/Injury or Date of Surgery: 19  Date of Referral to PT: 19  Referring Physician: MD Wood    Admit Date: 2019    Visit Dx:    ICD-10-CM ICD-9-CM   1. Acute on chronic respiratory failure with hypoxia and hypercapnia (CMS/HCC) J96.21 518.84    J96.22 786.09     799.02   2. Pneumothorax on right J93.9 512.89   3. Opacity of lung on imaging study R91.8 793.19   4. Injury of head, initial encounter S09.90XA 959.01   5. Abrasion of scalp, initial encounter S00.01XA 910.0   6. Anticoagulated by anticoagulation treatment Z79.01 V58.61   7. History of atrial fibrillation Z86.79 V12.59   8. Impaired mobility and ADLs Z74.09 799.89   9. Impaired functional mobility, balance, gait, and endurance Z74.09 V49.89   10. 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 Data deficit. No echoes   • Multiple closed fractures of ribs of left side   • Heart murmur   • Subcutaneous emphysema (CMS/HCC)   • Contusion of left lower extremity   • Renal cyst   • Lower extremity edema   • Chronic anticoagulation   • Small right traumatic pneumothorax due to fall and multiple right rib fractures   • Acute on chronic mixed respiratory acidosis   • Acute on chronic hypoxemic/hypercarbic respiratory failure not requiring ventilatory support. Improved (CMS/HCC)   • Severe cervical kyphosis presumably due to osteoporosis and compression fractures   • Failure to thrive.  Wheelchair-bound       Therapy Treatment    Rehabilitation  Treatment Summary     Row Name 04/08/19 1437 04/08/19 1422          Treatment Time/Intention    Discipline  physical therapist  -KR  occupational therapist  -CL     Document Type  therapy note (daily note)  -KR  therapy note (daily note)  -CL     Subjective Information  complains of;weakness;fatigue  -KR  complains of;weakness;fatigue  -CL     Mode of Treatment  physical therapy  -KR  --     Care Plan Review  care plan/treatment goals reviewed;risks/benefits reviewed;patient/other agree to care plan  -KR  --     Therapy Frequency (PT Clinical Impression)  daily  -KR  --     Patient Effort  good  -KR  good  -CL     Existing Precautions/Restrictions  fall;oxygen therapy device and L/min;other (see comments) residual R sided deficits from remote CVA  -KR  fall;oxygen therapy device and L/min  (Significant)  remote CVA w/ residual R sided deficits, blind  -CL     Recorded by [KR] Elvira Gil, PT 04/08/19 1630 [CL] Regina Barnhart, OT 04/08/19 1547     Row Name 04/08/19 1437 04/08/19 1422          Vital Signs    Pre Systolic BP Rehab  90  -KR  -- VSS, RN cleared for tx  -CL     Pre Treatment Diastolic BP  52  -KR  --     Post Systolic BP Rehab  101  -KR  --     Post Treatment Diastolic BP  58  -KR  --     Pretreatment Heart Rate (beats/min)  91  -KR  --     Posttreatment Heart Rate (beats/min)  86  -KR  --     Pre SpO2 (%)  95  -KR  --     O2 Delivery Pre Treatment  room air  -KR  --     Post SpO2 (%)  95  -KR  --     O2 Delivery Post Treatment  room air  -KR  --     Pre Patient Position  Sitting  -KR  --     Intra Patient Position  Sitting  -KR  --     Post Patient Position  Supine  -KR  --     Recorded by [KR] Elvira Gil, PT 04/08/19 1630 [CL] Regina Barnhart, OT 04/08/19 1547     Row Name 04/08/19 1437             Cognitive Assessment/Intervention    Additional Documentation  Cognitive Assessment/Intervention (Group)  -KR      Recorded by [KR] Elvira Gil, PT 04/08/19 1630      Row Name 04/08/19 1437 04/08/19  1422          Cognitive Assessment/Intervention- PT/OT    Affect/Mental Status (Cognitive)  WFL  -KR  WFL  -CL     Orientation Status (Cognition)  oriented to;person;place;situation  -KR  oriented x 3  -CL     Follows Commands (Cognition)  follows one step commands;over 90% accuracy;repetition of directions required;verbal cues/prompting required  -KR  follows one step commands;over 90% accuracy;repetition of directions required;verbal cues/prompting required  -CL     Cognitive Function (Cognitive)  safety deficit  -KR  safety deficit  -CL     Safety Deficit (Cognitive)  mild deficit;awareness of need for assistance;insight into deficits/self awareness;safety precautions awareness;safety precautions follow-through/compliance  -KR  mild deficit;awareness of need for assistance;safety precautions awareness  -CL     Personal Safety Interventions  fall prevention program maintained;nonskid shoes/slippers when out of bed;muscle strengthening facilitated  -KR  fall prevention program maintained;gait belt;nonskid shoes/slippers when out of bed  -CL     Recorded by [KR] Elvira Gil, PT 04/08/19 1630 [CL] Regina Barnhart, OT 04/08/19 1547     Row Name 04/08/19 1437 04/08/19 1422          Bed Mobility Assessment/Treatment    Bed Mobility Assessment/Treatment  scooting/bridging  -KR  --     Scooting/Bridging [place] (Bed Mobility)  maximum assist (25% patient effort);2 person assist;verbal cues  -KR  --     Comment (Bed Mobility)  --  UIC.   -CL     Recorded by [KR] Elvira Gil, PT 04/08/19 1630 [CL] Regina Barnhart, OT 04/08/19 1547     Row Name 04/08/19 1437 04/08/19 1422          Transfer Assessment/Treatment    Transfer Assessment/Treatment  chair-bed transfer  -KR  --     Comment (Transfers)  Pt transferred from chair to bed with mechanical lift. Pt declined any STS transfers.   -KR  Pt declined.   -CL     Recorded by [KR] Elvira Gil, PT 04/08/19 1630 [CL] Regina Barnhart, OT 04/08/19 1547     Row Name 04/08/19  1437             Chair-Bed Transfer    Chair-Bed Esmer[place]da (Transfers)  dependent (less than 25% patient effort);2 person assist  -KR      Assistive Device (Chair-Bed Transfers)  mechanical lift/aid  -KR      Recorded by [KR] Elvira Gil, PT 04/08/19 1630      Row Name 04/08/19 1437             Gait/Stairs Assessment/Training    Esmeralda Level (Gait)  unable to assess  -KR      Comment (Gait/Stairs)  Ambulation deferred. Not appropriate to assess.   -KR      Recorded by [KR] Elvira Gil, PT 04/08/19 1630      Row Name 04/08/19 1437 04/08/19 1422          Motor Skills Assessment/Interventions    Additional Documentation  Balance (Group);Therapeutic Exercise (Group)  -KR  Therapeutic Exercise (Group)  -CL     Recorded by [KR] Elvira Gil, PT 04/08/19 1630 [CL] Regina Barnhart, OT 04/08/19 1547     Row Name 04/08/19 1437 04/08/19 1422          Therapeutic Exercise    Therapeutic Exercise  seated, lower extremities  -KR  seated, upper extremities  -CL     Additional Documentation  Therapeutic Exercise (Row)  -KR  --     83432 - PT Therapeutic Exercise Minutes  6  -KR  --     88778 - PT Therapeutic Activity Minutes  7  -KR  --     89148 - OT Therapeutic Exercise Minutes  --  4  -CL     30366 - OT Therapeutic Activity Minutes  --  8  -CL     Recorded by [KR] Elvira Gil, PT 04/08/19 1630 [CL] Regina Barnhart, OT 04/08/19 1547     Row Name 04/08/19 1422             Upper Extremity Seated Therapeutic Exercise    Performed, Seated Upper Extremity (Therapeutic Exercise)  shoulder flexion/extension;elbow flexion/extension;forearm supination/pronation;wrist flexion/extension;digit flexion/extension  -CL      Exercise Type, Seated Upper Extremity (Therapeutic Exercise)  AAROM (active assistive range of motion);PROM (passive range of motion)  -CL      Sets/Reps Detail, Seated Upper Extremity (Therapeutic Exercise)  1/10  -CL      Comment, Seated Upper Extremity (Therapeutic Exercise)  RUE  -CL      Recorded by  [CL] Regina Barnhart, OT 04/08/19 1547      Row Name 04/08/19 1437             Lower Extremity Seated Therapeutic Exercise    Performed, Seated Lower Extremity (Therapeutic Exercise)  hip flexion/extension;ankle dorsiflexion/plantarflexion;LAQ (long arc quad), knee extension  -KR      Exercise Type, Seated Lower Extremity (Therapeutic Exercise)  AAROM (active assistive range of motion);AROM (active range of motion)  -KR      Sets/Reps Detail, Seated Lower Extremity (Therapeutic Exercise)  BLE 1/10  -KR      Comment, Seated Lower Extremity (Therapeutic Exercise)  pt required assistance with R LE  -KR      Recorded by [KR] Elvira Gil, PT 04/08/19 1630      Row Name 04/08/19 1437 04/08/19 1422          Balance    Balance  static sitting balance  -KR  static sitting balance;dynamic sitting balance  -CL     Recorded by [KR] Elvira Gil, PT 04/08/19 1630 [CL] Regina Barnhart, OT 04/08/19 1547     Row Name 04/08/19 1437 04/08/19 1422          Static Sitting Balance    Level of [place] (Unsupported Sitting, Static Balance)  contact guard assist  -KR  contact guard assist  -CL     Sitting Position (Unsupported Sitting, Static Balance)  sitting on edge of bed  -KR  sitting in chair  -CL     Recorded by [KR] Elvira Gil, PT 04/08/19 1630 [CL] Regina Barnhart, OT 04/08/19 1547     Row Name 04/08/19 1422             Dynamic Sitting Balance    Level of [place], Reaches Outside Midline (Sitting, Dynamic Balance)  minimal assist, 75% patient effort  -CL      Sitting Position, Reaches Outside Midline (Sitting, Dynamic Balance)  sitting in chair  -CL      Comment, Reaches Outside Midline (Sitting, Dynamic Balance)  A/P leaning, reaching for objects  -CL      Recorded by [CL] Regina Barnhart, OT 04/08/19 1547      Row Name 04/08/19 1437 04/08/19 1422          Positioning and Restraints    Pre-Treatment Position  sitting in chair/recliner  -KR  sitting in chair/recliner  -CL     Post Treatment Position  bed  -KR  chair  -CL      In Bed  notified nsg;supine;call light within reach;encouraged to call for assist;with family/caregiver;side rails up x3;RUE elevated;LUE elevated  -KR  --     In Chair  --  notified nsg;sitting;call light within reach;encouraged to call for assist;with PT;with family/caregiver transitioned to PT treatment  -CL     Recorded by [KR] Elvira Gil, PT 04/08/19 1630 [CL] Regina Barnhart, OT 04/08/19 1547     Row Name 04/08/19 1437             Pain Assessment    Additional Documentation  Pain Scale: Numbers Pre/Post-Treatment (Group)  -KR      Recorded by [KR] Elvira Gil, PT 04/08/19 1630      Row Name 04/08/19 1437             Pain Scale: Numbers Pre/Post-Treatment    Pain Scale: Numbers, Pretreatment  6/10  -KR      Pain Scale: Numbers, Post-Treatment  6/10  -KR      Pain Location - Side  Right  -KR      Pain Location  other (see comments) rib cage  -KR      Pain Intervention(s)  Repositioned;Ambulation/increased activity  -KR      Recorded by [KR] lEvira Gil, PT 04/08/19 1630      Row Name 04/08/19 1422             Pain Scale 2: FACES Pre/Post-Treatment    Pain 2: FACES Scale, Pretreatment  4-->hurts little more  -CL      Pain 2: FACES Scale, Post-Treatment  4-->hurts little more  -CL      Pain Location 2 - Side  Right  -CL      Pain Location 2  -- rib cage  -CL      Pre/Post Treatment Pain 2 Comment  Tolerated.   -CL      Pain Intervention(s) 2  Repositioned;Ambulation/increased activity  -CL      Recorded by [CL] Regina Barnhart, OT 04/08/19 1547      Row Name                Wound 04/05/19 0030 Right medial gluteal    Wound - Properties Group Date first assessed: 04/05/19 [MR] Time first assessed: 0030 [MR] Present On Admission : yes [MR] Side: Right [MR] Orientation: medial [MR] Location: gluteal [MR] Stage, Pressure Injury: Stage 3 [MR] Recorded by:  [MR] Julia Gonzalez RN 04/05/19 1016    Row Name 04/08/19 1437             Plan of Care Review    Plan of Care Reviewed With  patient  -KR      Recorded  by [KR] Elvira Gil, PT 04/08/19 1630      Row Name 04/08/19 1437             Outcome Summary/Treatment Plan (PT)    Daily Summary of Progress (PT)  progress toward functional goals is gradual  -KR      Recorded by [KR] Elvira Gil, PT 04/08/19 1630        User Key  (r) = Recorded By, (t) = Taken By, (c) = Cosigned By    Initials Name Effective Dates Discipline     LisaJulia RN 06/16/16 -  Nurse    Regina Westbrook, OT 04/03/18 -  OT    KR Elvira Gil, PT 04/03/18 -  PT          Wound 04/05/19 0030 Right medial gluteal (Active)   Dressing Appearance dry;intact;no drainage 4/8/2019  3:30 PM   Closure KIM 4/8/2019  3:30 PM   Base dressing in place, unable to visualize 4/8/2019  3:30 PM   Dressing Care, Wound foam;low-adherent 4/8/2019  3:30 PM   Periwound Care, Wound dry periwound area maintained 4/8/2019  3:30 PM           Physical Therapy Education     Title: PT OT SLP Therapies (In Progress)     Topic: Physical Therapy (In Progress)     Point: Mobility training (In Progress)     Learning Progress Summary           Patient Acceptance, E, NR by KR at 4/8/2019  2:37 PM    Acceptance, E, NR by LIZETTE at 4/5/2019  2:45 PM   Family Acceptance, E, NR by LIZETTE at 4/5/2019  2:45 PM                   Point: Home exercise program (In Progress)     Learning Progress Summary           Patient Acceptance, E, NR by KR at 4/8/2019  2:37 PM    Acceptance, E, NR by LIZETTE at 4/5/2019  2:45 PM   Family Acceptance, E, NR by LIZETTE at 4/5/2019  2:45 PM                   Point: Body mechanics (In Progress)     Learning Progress Summary           Patient Acceptance, E, NR by KR at 4/8/2019  2:37 PM    Acceptance, E, NR by LIZETTE at 4/5/2019  2:45 PM   Family Acceptance, E, NR by LIZETTE at 4/5/2019  2:45 PM                   Point: Precautions (In Progress)     Learning Progress Summary           Patient Acceptance, E, NR by KR at 4/8/2019  2:37 PM    Acceptance, E, NR by LIZETTE at 4/5/2019  2:45 PM   Family Acceptance, E, NR by LIZETTE at 4/5/2019   2:45 PM                               User Key     Initials Effective Dates Name Provider Type Discipline    LIZETTE 06/19/15 -  Maria C Pierson, PT Physical Therapist PT    KR 04/03/18 -  Elvira Gil, PT Physical Therapist PT                PT Recommendation and Plan  Therapy Frequency (PT Clinical Impression): daily  Outcome Summary/Treatment Plan (PT)  Daily Summary of Progress (PT): progress toward functional goals is gradual  Plan of Care Reviewed With: patient  Progress: no change  Outcome Summary: Pt demonstrated improved static sitting balance progressing to CGA in chair. Pt performed B LE AROM/AAROM ther ex; transferred from chair to bed with mechanical lift. Pt c/o increased pain in R ribcage. Continue to progress as appropriate.   Outcome Measures     Row Name 04/08/19 1437 04/08/19 1422          How much help from another person do you currently need...    Turning from your back to your side while in flat bed without using bedrails?  2  -KR  --     Moving from lying on back to sitting on the side of a flat bed without bedrails?  2  -KR  --     Moving to and from a bed to a chair (including a wheelchair)?  1  -KR  --     Standing up from a chair using your arms (e.g., wheelchair, bedside chair)?  1  -KR  --     Climbing 3-5 steps with a railing?  1  -KR  --     To walk in hospital room?  1  -KR  --     AM-PAC 6 Clicks Score  8  -KR  --        How much help from another is currently needed...    Putting on and taking off regular lower body clothing?  --  1  -CL     Bathing (including washing, rinsing, and drying)  --  1  -CL     Toileting (which includes using toilet bed pan or urinal)  --  1  -CL     Putting on and taking off regular upper body clothing  --  2  -CL     Taking care of personal grooming (such as brushing teeth)  --  2  -CL     Eating meals  --  2  -CL     Score  --  9  -CL        Functional Assessment    Outcome Measure Options  AM-PAC 6 Clicks Basic Mobility (PT)  -KR  AM-PAC 6 Clicks  Daily Activity (OT)  -CL       User Key  (r) = Recorded By, (t) = Taken By, (c) = Cosigned By    Initials Name Provider Type    CL Regina Barnhart, OT Occupational Therapist    Elvira Baldwin, PT Physical Therapist         Time Calculation:   PT Charges     Row Name 04/08/19 1437             Time Calculation    Start Time  1437  -KR      PT Received On  04/08/19  -KR      PT Goal Re-Cert Due Date  04/15/19  -KR         Time Calculation- PT    Total Timed Code Minutes- PT  13 minute(s)  -KR         Timed Charges    54683 - PT Therapeutic Exercise Minutes  6  -KR      40310 - PT Therapeutic Activity Minutes  7  -KR        User Key  (r) = Recorded By, (t) = Taken By, (c) = Cosigned By    Initials Name Provider Type    Elvira Baldwin, PT Physical Therapist        Therapy Charges for Today     Code Description Service Date Service Provider Modifiers Qty    04618414048 HC PT THERAPEUTIC ACT EA 15 MIN 4/8/2019 Elvira Gil, PT GP 1    93291453110 HC PT THER SUPP EA 15 MIN 4/8/2019 Evlira Gil, PT GP 1          PT G-Codes  Outcome Measure Options: AM-PAC 6 Clicks Basic Mobility (PT)  AM-PAC 6 Clicks Score: 8  Score: 9    Olivia Gil PT  4/8/2019

## 2019-04-08 NOTE — PLAN OF CARE
Problem: Patient Care Overview  Goal: Plan of Care Review  Outcome: Ongoing (interventions implemented as appropriate)   04/08/19 6502   OTHER   Outcome Summary Pt demo improved sitting balance by progressing to CGA for static and Min A for dynamic. Pt conts to be limited d/t c/o R rib pain and significant weakness. Recommend cont skilled IPOT POC.    Coping/Psychosocial   Plan of Care Reviewed With patient   Coping/Psychosocial   Patient Agreement with Plan of Care agrees   Plan of Care Review   Progress improving

## 2019-04-08 NOTE — PROGRESS NOTES
INTENSIVIST   PROGRESS NOTE     Hospital:  LOS: 4 days     Ms. Kristin Magdaleno, 84 y.o. 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  She had been living at home with her  ever since her CABG in  when she went into atrial fibrillation and suffered a CVA and residual total right hemiparesis.  Although living at home has been wheelchair-bound since that time and quite debilitated.  In fact had a hospitalization in 2018 due to a fall and multiple left rib fractures. Unfortunately her  recently  and the patient had to enter assisted living.  Daughter has noted progressive cervical kyphosis with her head flexed forward.  On 19 she suffered a fall while trying to get off the toilet.  Subsequently became very dyspneic and tachypneic complaining of right-sided pleuritic chest pain.  EMS noted her O2 sats to be in the 60s and brought her to [facility] ER.  ABGs on BiPAP with an FiO2 of 60% revealed a pH of 7.25, PCO2 of 54, and a PO2 of 93.  She had multiple right rib fractures and a tiny right apical pneumothorax that did not require chest tube placement.  She made it clear that she did not want heroic measures such as CPR or intubation.  Her Coumadin was reversed and it has been decided that the risk of Coumadin after two severe falls is higher than the risk of CVA off anticoagulant therapy.    She has been weaned off Bipap and HFNC. She has been evaluated by Speech who recommended a modified diet. The patient and her family have chosen instead to pursue a  comfort diet and have accepted the risk of aspiration and do not wish for CPR or mechanical ventilation.       Interval History:  No acute events overnight. Still receiving IV Fentanyl for pain which makes her very drowsy.        The patient's relevant past medical, surgical and social history were reviewed and updated in Epic as appropriate.      ROS: Unable to obtain secondary to sedation.     Objective   O     Vitals:  Temp  Min: 97.5 °F (36.4 °C)  Max: 99.5 °F (37.5 °C)  BP  Min: 95/45  Max: 128/71  Pulse  Min: 85  Max: 123  Resp  Min: 14  Max: 22  SpO2  Min: 88 %  Max: 100 % Flow (L/min)  Min: 3  Max: 4    Intake/Ouptut 24 hrs (7:00AM - 6:59 AM)  Intake & Output (last 3 days)       04/05 0701 - 04/06 0700 04/06 0701 - 04/07 0700 04/07 0701 - 04/08 0700 04/08 0701 - 04/09 0700    P.O.    120    I.V. (mL/kg) 310.6 (6.2) 109.5 (2.2) 336.6 (6.7) 20 (0.4)    IV Piggyback 200 234.2 140.2 150    Total Intake(mL/kg) 510.6 (10.2) 343.7 (6.9) 476.8 (9.6) 290 (5.8)    Urine (mL/kg/hr) 800 (0.7) 900 (0.8) 400 (0.3)     Total Output 800 900 400     Net -289.4 -556.3 +76.8 +290            Urine Unmeasured Occurrence   1 x 1 x            Physical Examination  Telemetry:  Normal sinus rhythm.    Constitutional:  No acute distress.  Sleeping.  Severe kyphosis.    Cardiovascular: Normal rate, regular and rhythm. Normal heart sounds.  No murmurs, gallop or rub.   Respiratory: No respiratory distress. Normal respiratory effort.  Diminished bilaterally.  No wheezes or rhonchi.    Abdominal:  Soft. No masses. Non-tender. No distension. No HSM.   Extremities: No digital cyanosis. No clubbing.  No peripheral edema.   Neurological:   Sleeping. Awakens to stimulation.               Results from last 7 days   Lab Units 04/08/19  0551 04/07/19  0330 04/06/19  0543   WBC 10*3/mm3 8.81 12.46* 13.26*   HEMOGLOBIN g/dL 9.4* 9.9* 10.4*   MCV fL 97.4 94.7 96.5   PLATELETS 10*3/mm3 283 238 254     Results from last 7 days   Lab Units  04/08/19  0551 04/07/19  0400 04/06/19  0543 04/05/19  0409   SODIUM mmol/L 141 138 139  --    POTASSIUM mmol/L 4.0 3.8 3.9  --    CO2 mmol/L 28.0 27.0 24.0  --    CREATININE mg/dL 0.92 0.93 0.90  --    GLUCOSE mg/dL 114* 134* 179*  --    MAGNESIUM mg/dL  --  1.9 2.1 1.7   PHOSPHORUS mg/dL 3.5 2.6 3.6 4.8     Estimated Creatinine Clearance: 37.1 mL/min (by C-G formula based on SCr of 0.92 mg/dL).  Results from last 7 days   Lab Units 04/07/19  0400 04/06/19  0543 04/04/19  2128   ALK PHOS U/L 54 58 75   BILIRUBIN mg/dL 0.6 0.5 0.3   ALT (SGPT) U/L 51* 62* 96*   AST (SGOT) U/L 127* 152* 95*       Results from last 7 days   Lab Units 04/07/19  0357 04/05/19  0136 04/04/19  2143   PH, ARTERIAL pH units 7.446 7.259* 7.255*   PCO2, ARTERIAL mm Hg 43.2 60.6* 54.2*   PO2 ART mm Hg 76.8* 63.6* 93.6   FIO2 % 35 55 60       Images:  Imaging Results (last 24 hours)     Procedure Component Value Units Date/Time    XR Chest 1 View [[id number]] Collected:  04/07/19 1249     Updated:  04/07/19 2354    Narrative:          EXAMINATION: XR CHEST 1 VW - 04/07/2019     INDICATION:  J96.21-Acute and chronic respiratory failure with hypoxia;  J96.22-Acute and chronic respiratory failure with hypercapnia;  J93.9-Pneumothorax, unspecified; R91.8-Other nonspecific abnormal  finding of lung field; S09.90XA-Unspecified injury of head, initial  encounter; S00.01XA-Abrasion of scalp, initial encounter; Z79.01-Long  term (current) use of anticoagulants.     COMPARISON: 04/06/2019     FINDINGS: There is still fairly extensive interstitial disease  bilaterally, but uniformly improved from yesterday's study. There is  minimal if any effusion and no evidence of pneumothorax. Heart shadow is  a little smaller today, considered within normal limits.           Impression:       Improving pulmonary interstitial edema.     DICTATED:   04/07/2019  EDITED/ls :   04/07/2019      This report was finalized on 4/7/2019 11:51 PM by DR. Maximilian Green MD.                Results: Reviewed.  I reviewed the patient's new laboratory and imaging results.  I independently reviewed the patient's new images.    Medications: Reviewed.    Assessment/Plan   A / P     Ms. Magdaleno is an 85yo F who is admitted for acute respiratory failure after a fall with subsequent right rib fractures.     Nutrition:   Diet Regular; Consistent Carbohydrate  Advance Directives:   Code Status and Medical Interventions:   Ordered at: 04/05/19 0936     Limited Support to NOT Include:    Intubation     Code Status:    No CPR     Medical Interventions (Level of Support Prior to Arrest):    Limited       Active Hospital Problems    Diagnosis   • **Acute on chronic hypoxemic/hypercarbic respiratory failure not requiring ventilatory support. Improved (CMS/HCC)   • Severe cervical kyphosis presumably due to osteoporosis and compression fractures   • Failure to thrive.  Wheelchair-bound   • Small right traumatic pneumothorax due to fall and multiple right rib fractures   • History of rheumatic heart disease. Data deficit. No echoes     as a child, heart murmur, no recent echos (pt. declines)     • Atrial fibrillation; on Coumadin, Digoxin, Metoprolol   • Diabetes mellitus without complication (CMS/[facility])     Type 2 or unspecified, not stated as uncontrolled.     • Remote CVA with right hemiparesis.  20+ years (CMS/HCC)     With Rt hemiparesis x 20+ years     • Essential hypertension, benign   • Pure hypercholesterolemia   • CAD status post CABG       Assessment / Plan:    1. Continue comfort diet.   2. Discontinue Fentanyl.   3. Increase PO pain medications   4. Continue Metoprolol  5. Complete 7 days of antimicrobial therapy  6. Case management working on rehab options with family  7. Okay to transfer to telemetry when a bed is available.   8. AM labs    Plan of care and goals reviewed during interdisciplinary rounds.  I discussed the patient's findings and my recommendations with family and nursing  staff      Shruthi Sy, DO    Intensive Care Medicine and Pulmonary Medicine

## 2019-04-08 NOTE — PLAN OF CARE
Problem: Patient Care Overview  Goal: Plan of Care Review  Outcome: Ongoing (interventions implemented as appropriate)   04/08/19 3059   OTHER   Outcome Summary Pt demonstrated improved static sitting balance progressing to CGA in chair. Pt performed B LE AROM/AAROM ther ex; transferred from chair to bed with mechanical lift. Pt c/o increased pain in R ribcage. Continue to progress as appropriate.    Coping/Psychosocial   Plan of Care Reviewed With patient   Plan of Care Review   Progress no change

## 2019-04-08 NOTE — THERAPY TREATMENT NOTE
Acute Care - Occupational Therapy Treatment Note  [facility]     Patient Name: Kristin Magdaleno  : 1935  MRN: [id number]  Today's Date: 2019  Onset of Illness/Injury or Date of Surgery: 19  Date of Referral to OT: 19  Referring Physician: MD Wood    Admit Date: 2019       ICD-10-CM ICD-9-CM   1. Acute on chronic respiratory failure with hypoxia and hypercapnia (CMS/HCC) J96.21 518.84    J96.22 786.09     799.02   2. Pneumothorax on right J93.9 512.89   3. Opacity of lung on imaging study R91.8 793.19   4. Injury of head, initial encounter S09.90XA 959.01   5. Abrasion of scalp, initial encounter S00.01XA 910.0   6. Anticoagulated by anticoagulation treatment Z79.01 V58.61   7. History of atrial fibrillation Z86.79 V12.59   8. Impaired mobility and ADLs Z74.09 799.89   9. Impaired functional mobility, balance, gait, and endurance Z74.09 V49.89   10. 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 Data deficit. No echoes   • Multiple closed fractures of ribs of left side   • Heart murmur   • Subcutaneous emphysema (CMS/HCC)   • Contusion of left lower extremity   • Renal cyst   • Lower extremity edema   • Chronic anticoagulation   • Small right traumatic pneumothorax due to fall and multiple right rib fractures   • Acute on chronic mixed respiratory acidosis   • Acute on chronic hypoxemic/hypercarbic respiratory failure not requiring ventilatory support. Improved (CMS/HCC)   • Severe cervical kyphosis presumably due to osteoporosis and compression fractures   • Failure to thrive.  Wheelchair-bound     Past Medical History:   Diagnosis Date   • Atrial  fibrillation (CMS/HCC)    • CAD (coronary artery disease)     s/p cabg   • Chronic anticoagulation    • Diabetes mellitus (CMS/HCC)     type II    • H/O hemiparesis     Rt sided    • Heart murmur     RHF as child.  no recent echos (pt declines)   • Hyperlipidemia    • Hypertension    • Hypothyroid    • Rheumatic heart disease     as a child, heart murmur, no recent echos (pt. declines)   • Stroke (CMS/HCC)     20+ years ago in OR during CABG      Past Surgical History:   Procedure Laterality Date   • APPENDECTOMY     • BREAST SURGERY      no breast tissue, never had tara, mel. breast implants   • CORONARY ARTERY BYPASS GRAFT  1994    CVA  and a fib post-op - on coumadin since   • TOTAL HIP ARTHROPLASTY REVISION Right 1998       Therapy Treatment    Rehabilitation Treatment Summary     Row Name 04/08/19 1422             Treatment Time/Intention    Discipline  occupational therapist  -CL      Document Type  therapy note (daily note)  -CL      Subjective Information  complains of;weakness;fatigue  -CL      Patient Effort  good  -CL      Existing Precautions/Restrictions  fall;oxygen therapy device and L/min  (Significant)  remote CVA w/ residual R sided deficits, blind  -CL      Recorded by [CL] Regina Barnhart OT 04/08/19 1547      Row Name 04/08/19 1422             Vital Signs    Pre Systolic BP Rehab  -- VSS, RN cleared for tx  -CL      Recorded by [CL] Regina Barnhart OT 04/08/19 1547      Row Name 04/08/19 1422             Cognitive Assessment/Intervention- PT/OT    Affect/Mental Status (Cognitive)  WFL  -CL      Orientation Status (Cognition)  oriented x 3  -CL      Follows Commands (Cognition)  follows one step commands;over 90% accuracy;repetition of directions required;verbal cues/prompting required  -CL      Cognitive Function (Cognitive)  safety deficit  -CL      Safety Deficit (Cognitive)  mild deficit;awareness of need for assistance;safety precautions awareness  -CL      Personal Safety Interventions  fall  prevention program maintained;gait belt;nonskid shoes/slippers when out of bed  -CL      Recorded by [CL] Regina Barnhart OT 04/08/19 1547      Row Name 04/08/19 1422             Bed Mobility Assessment/Treatment    Comment (Bed Mobility)  UIC.   -CL      Recorded by [CL] Regina Barnhart OT 04/08/19 1547      Row Name 04/08/19 1422             Transfer Assessment/Treatment    Comment (Transfers)  Pt declined.   -CL      Recorded by [CL] Regina Barnhart OT 04/08/19 1547      Row Name 04/08/19 1422             Motor Skills Assessment/Interventions    Additional Documentation  Therapeutic Exercise (Group)  -CL      Recorded by [CL] Regina Barnhart OT 04/08/19 1547      Row Name 04/08/19 1422             Therapeutic Exercise    Therapeutic Exercise  seated, upper extremities  -CL      30844 - OT Therapeutic Exercise Minutes  4  -CL      43030 - OT Therapeutic Activity Minutes  8  -CL      Recorded by [CL] Regina Barnhart OT 04/08/19 1547      Row Name 04/08/19 1422             Upper Extremity Seated Therapeutic Exercise    Performed, Seated Upper Extremity (Therapeutic Exercise)  shoulder flexion/extension;elbow flexion/extension;forearm supination/pronation;wrist flexion/extension;digit flexion/extension  -CL      Exercise Type, Seated Upper Extremity (Therapeutic Exercise)  AAROM (active assistive range of motion);PROM (passive range of motion)  -CL      Sets/Reps Detail, Seated Upper Extremity (Therapeutic Exercise)  1/10  -CL      Comment, Seated Upper Extremity (Therapeutic Exercise)  RUE  -CL      Recorded by [CL] Regina Barnhart OT 04/08/19 1547      Row Name 04/08/19 1422             Balance    Balance  static sitting balance;dynamic sitting balance  -CL      Recorded by [CL] Regina Barnhart OT 04/08/19 1547      Row Name 04/08/19 1422             Static Sitting Balance    Level of [place] (Unsupported Sitting, Static Balance)  contact guard assist  -CL      Sitting Position (Unsupported Sitting, Static Balance)  sitting  in chair  -CL      Recorded by [CL] Regina Barnhart OT 04/08/19 1547      Row Name 04/08/19 1422             Dynamic Sitting Balance    Level of [place], Reaches Outside Midline (Sitting, Dynamic Balance)  minimal assist, 75% patient effort  -CL      Sitting Position, Reaches Outside Midline (Sitting, Dynamic Balance)  sitting in chair  -CL      Comment, Reaches Outside Midline (Sitting, Dynamic Balance)  A/P leaning, reaching for objects  -CL      Recorded by [CL] Regina Barnhart OT 04/08/19 1547      Row Name 04/08/19 1422             Positioning and Restraints    Pre-Treatment Position  sitting in chair/recliner  -CL      Post Treatment Position  chair  -CL      In Chair  notified nsg;sitting;call light within reach;encouraged to call for assist;with PT;with family/caregiver transitioned to PT treatment  -CL      Recorded by [CL] Regina Barnhart, GENO 04/08/19 1547      Row Name 04/08/19 1422             Pain Scale 2: FACES Pre/Post-Treatment    Pain 2: FACES Scale, Pretreatment  4-->hurts little more  -CL      Pain 2: FACES Scale, Post-Treatment  4-->hurts little more  -CL      Pain Location 2 - Side  Right  -CL      Pain Location 2  -- rib cage  -CL      Pre/Post Treatment Pain 2 Comment  Tolerated.   -CL      Pain Intervention(s) 2  Repositioned;Ambulation/increased activity  -CL      Recorded by [CL] Regina Barnhart, GENO 04/08/19 1547      Row Name                Wound 04/05/19 0030 Right medial gluteal    Wound - Properties Group Date first assessed: 04/05/19 [MR] Time first assessed: 0030 [MR] Present On Admission : yes [MR] Side: Right [MR] Orientation: medial [MR] Location: gluteal [MR] Stage, Pressure Injury: Stage 3 [MR] Recorded by:  [MR] GonzalezJulia lagos RN 04/05/19 1016      User Key  (r) = Recorded By, (t) = Taken By, (c) = Cosigned By    Initials Name Effective Dates Discipline    MR GonzalezJulia RN 06/16/16 -  Nurse    Regina Westbrook OT 04/03/18 -  OT        Wound 04/05/19 0030 Right medial  gluteal (Active)   Dressing Appearance dry;intact 4/8/2019  2:00 PM   Closure None 4/8/2019  2:00 PM   Base clean;dry 4/8/2019  2:00 PM       Occupational Therapy Education     Title: PT OT SLP Therapies (In Progress)     Topic: Occupational Therapy (In Progress)     Point: ADL training (In Progress)     Description: Instruct learner(s) on proper safety adaptation and remediation techniques during self care or transfers.   Instruct in proper use of assistive devices.    Learning Progress Summary           Patient Acceptance, E, NR by CL at 4/8/2019  3:47 PM    Comment:  Pt educated on appropriate safety precautions, t/f techniques, role of OT, and benefits of therapy.    Acceptance, E, NR by CL at 4/5/2019  2:33 PM    Comment:  Pt educated on appropriate safeyt precautions, role of OT, positioning, and benefits of therapy.   Family Acceptance, E, NR by CL at 4/5/2019  2:33 PM    Comment:  Pt educated on appropriate safeyt precautions, role of OT, positioning, and benefits of therapy.                   Point: Home exercise program (In Progress)     Description: Instruct learner(s) on appropriate technique for monitoring, assisting and/or progressing therapeutic exercises/activities.    Learning Progress Summary           Patient Acceptance, E, NR by CL at 4/8/2019  3:47 PM    Comment:  Pt educated on appropriate safety precautions, t/f techniques, role of OT, and benefits of therapy.                   Point: Precautions (In Progress)     Description: Instruct learner(s) on prescribed precautions during self-care and functional transfers.    Learning Progress Summary           Patient Acceptance, E, NR by CL at 4/8/2019  3:47 PM    Comment:  Pt educated on appropriate safety precautions, t/f techniques, role of OT, and benefits of therapy.    Acceptance, E, NR by CL at 4/5/2019  2:33 PM    Comment:  Pt educated on appropriate safeyt precautions, role of OT, positioning, and benefits of therapy.   Family Acceptance, E,  NR by CL at 4/5/2019  2:33 PM    Comment:  Pt educated on appropriate safeyt precautions, role of OT, positioning, and benefits of therapy.                   Point: Body mechanics (In Progress)     Description: Instruct learner(s) on proper positioning and spine alignment during self-care, functional mobility activities and/or exercises.    Learning Progress Summary           Patient Acceptance, E, NR by CL at 4/8/2019  3:47 PM    Comment:  Pt educated on appropriate safety precautions, t/f techniques, role of OT, and benefits of therapy.    Acceptance, E, NR by CL at 4/5/2019  2:33 PM    Comment:  Pt educated on appropriate safeyt precautions, role of OT, positioning, and benefits of therapy.   Family Acceptance, E, NR by CL at 4/5/2019  2:33 PM    Comment:  Pt educated on appropriate safeyt precautions, role of OT, positioning, and benefits of therapy.                               User Key     Initials Effective Dates Name Provider Type Discipline     04/03/18 -  Regina Barnhart, OT Occupational Therapist OT                OT Recommendation and Plan  Outcome Summary/Treatment Plan (OT)  Anticipated Equipment Needs at Discharge (OT): (TBA further)  Anticipated Discharge Disposition (OT): skilled nursing facility  Therapy Frequency (OT Eval): daily  Plan of Care Review  Plan of Care Reviewed With: patient  Plan of Care Reviewed With: patient  Outcome Summary: Pt demo improved sitting balance by progressing to CGA for static and Min A for dynamic. Pt conts to be limited d/t c/o R rib pain and significant weakness. Recommend cont skilled IPOT POC.   Outcome Measures     Row Name 04/08/19 1422             How much help from another is currently needed...    Putting on and taking off regular lower body clothing?  1  -CL      Bathing (including washing, rinsing, and drying)  1  -CL      Toileting (which includes using toilet bed pan or urinal)  1  -CL      Putting on and taking off regular upper body clothing  2  -CL       Taking care of personal grooming (such as brushing teeth)  2  -CL      Eating meals  2  -CL      Score  9  -CL         Functional Assessment    Outcome Measure Options  AM-PAC 6 Clicks Daily Activity (OT)  -CL        User Key  (r) = Recorded By, (t) = Taken By, (c) = Cosigned By    Initials Name Provider Type    CL Regina Barnhart OT Occupational Therapist           Time Calculation:   Time Calculation- OT     Row Name 04/08/19 1422             Time Calculation- OT    OT Start Time  1422  -CL      OT Received On  04/08/19  -CL      OT Goal Re-Cert Due Date  04/15/19  -CL         Timed Charges    40587 - OT Therapeutic Exercise Minutes  4  -CL      24079 - OT Therapeutic Activity Minutes  8  -CL        User Key  (r) = Recorded By, (t) = Taken By, (c) = Cosigned By    Initials Name Provider Type    CL Regina Barnhart OT Occupational Therapist        Therapy Charges for Today     Code Description Service Date Service Provider Modifiers Qty    10374977779 HC OT THERAPEUTIC ACT EA 15 MIN 4/8/2019 Regina Barnhart OT GO 1    20697944466 HC OT THER SUPP EA 15 MIN 4/8/2019 Regina Barnhart OT GO 1               Regina Barnhart OT  4/8/2019

## 2019-04-08 NOTE — PLAN OF CARE
Problem: Fall Risk (Adult)  Goal: Identify Related Risk Factors and Signs and Symptoms  Outcome: Ongoing (interventions implemented as appropriate)   04/08/19 0524   Fall Risk (Adult)   Related Risk Factors (Fall Risk) age-related changes;gait/mobility problems;history of falls;sensory deficits;environment unfamiliar;fatigue/slow reaction     Goal: Absence of Fall  Outcome: Outcome(s) achieved Date Met: 04/08/19 04/08/19 0524   Fall Risk (Adult)   Absence of Fall making progress toward outcome       Problem: Skin Injury Risk (Adult)  Goal: Identify Related Risk Factors and Signs and Symptoms  Outcome: Ongoing (interventions implemented as appropriate)   04/08/19 0524   Skin Injury Risk (Adult)   Related Risk Factors (Skin Injury Risk) advanced age;mechanical forces;medical devices;mobility impaired;medication     Goal: Skin Health and Integrity  Outcome: Ongoing (interventions implemented as appropriate)   04/08/19 0524   Skin Injury Risk (Adult)   Skin Health and Integrity making progress toward outcome       Problem: Patient Care Overview  Goal: Plan of Care Review  Outcome: Ongoing (interventions implemented as appropriate)   04/08/19 0524   OTHER   Outcome Summary PT is on 3L NC humidified, recieving Norco q8 but still asking for fentanyl occassionally for break through pain. PT requested a new IV to be place in upper arm so she could bend her arm. L AC IV was D/C. Daughter requested that nurse know a different family member will come sit with pt today and to call her (Sara) if there is any change. PT has orders to be transferred to floor. Will continue to monitor.    Coping/Psychosocial   Plan of Care Reviewed With patient;daughter   Coping/Psychosocial   Patient Agreement with Plan of Care agrees   Plan of Care Review   Progress no change       Problem: Palliative Care (Adult)  Goal: Identify Related Risk Factors and Signs and Symptoms  Outcome: Ongoing (interventions implemented as appropriate)   04/08/19  0524   Palliative Care (Adult)   Palliative Care: Related Risk Factors advanced age;chronic illness;condition is progressive;injury (severe and disabling)   Palliative Care: Signs and Symptoms caregiver strain;fatigue;pain     Goal: Maximized Comfort  Outcome: Ongoing (interventions implemented as appropriate)   04/08/19 0524   Palliative Care (Adult)   Maximized Comfort making progress toward outcome     Goal: Enhanced Quality of Life  Outcome: Ongoing (interventions implemented as appropriate)   04/08/19 0524   Palliative Care (Adult)   Enhanced Quality of Life making progress toward outcome

## 2019-04-09 NOTE — PROGRESS NOTES
Continued Stay Note  [facility]     Patient Name: Kristin Magdaleno  MRN: [id number]  Today's Date: 4/9/2019    Admit Date: 4/4/2019    Discharge Plan     Row Name 04/09/19 1410       Plan    Plan  [facility]    Patient/Family in Agreement with Plan  yes    Plan Comments  Spoke w/ Irais at [facility], referral has been reviewed and they are able to accept patient to their Skilled Unit when medically ready and pending bed availability. CM following.     Final Discharge Disposition Code  03 - skilled nursing facility (SNF)        Discharge Codes    No documentation.       Expected Discharge Date and Time     Expected Discharge Date Expected Discharge Time    Apr 12, 2019             Cyndie Irizarry RN

## 2019-04-09 NOTE — PROGRESS NOTES
[facility] Medicine Services  PROGRESS NOTE    Patient Name: Kristin Magdaleno  : 1935  MRN: [id number]    Date of Admission: 2019  Length of Stay: 5  Primary Care Physician: Maryann Guillaume MD    Subjective   Subjective     CC:  Fall/hypoxia     HPI:  No acute events. Breathing is improved but currently having pain. States pain is controlled with medications.     Review of Systems  Gen- No fevers, chills  CV- No chest pain, palpitations  Resp- No cough, dyspnea  GI- No N/V/D, abd pain    Otherwise ROS is negative except as mentioned in the HPI.    Objective   Objective     Vital Signs:   Temp:  [98.2 °F (36.8 °C)-98.3 °F (36.8 °C)] 98.3 °F (36.8 °C)  Heart Rate:  [] 108  Resp:  [16-20] 20  BP: (106-131)/(51-83) 131/83        Physical Exam:  Constitutional: No acute distress, awake, alert  HENT: NCAT, mucous membranes moist  Respiratory: no resp distress; occ rhonchi  Cardiovascular: RRR, no murmurs, rubs, or gallops, palpable pedal pulses bilaterally  Gastrointestinal: Positive bowel sounds, soft, nontender, nondistended  Musculoskeletal: No bilateral ankle edema  Psychiatric: Appropriate affect, cooperative  Neurologic:strength symmetric in all extremities, Cranial Nerves grossly intact to confrontation, speech clear  Skin: No rashes    Results Reviewed:  I have personally reviewed current lab, radiology, and data and agree.    Results from last 7 days   Lab Units 19  0551 19  0330 19  0543 19  04019  213   WBC 10*3/mm3 8.81 12.46* 13.26* 17.83*  --    HEMOGLOBIN g/dL 9.4* 9.9* 10.4* 12.4  --    HEMATOCRIT % 30.2* 30.5* 33.0* 40.8  --    PLATELETS 10*3/mm3 283 238 254 295  --    INR   --   --  1.41* 2.04* 2.7*     Results from last 7 days   Lab Units 19  0551 19  0400 19  0543 04/04/19   SODIUM mmol/L 141 138 139 141   < >  --    POTASSIUM mmol/L 4.0 3.8 3.9 4.6   < >  --    CHLORIDE mmol/L 107 106  107 105   < >  --    CO2 mmol/L 28.0 27.0 24.0 23.0   < >  --    BUN mg/dL 35* 32* 28* 25*   < >  --    CREATININE mg/dL 0.92 0.93 0.90 1.15   < >  --    GLUCOSE mg/dL 114* 134* 179* 317*   < >  --    CALCIUM mg/dL 8.1* 8.1* 8.1* 8.5*   < >  --    ALT (SGPT) U/L  --  51* 62* 96*  --   --    AST (SGOT) U/L  --  127* 152* 95*  --   --    TROPONIN I ng/mL  --   --   --   --   --  0.00    < > = values in this interval not displayed.     Estimated Creatinine Clearance: 37.9 mL/min (by C-G formula based on SCr of 0.92 mg/dL).    BNP   Date Value Ref Range Status   04/07/2019 1,957.0 (H) 0.0 - 100.0 pg/mL Final     Comment:     Results may be falsely decreased if patient taking Biotin.       Microbiology Results Abnormal     Procedure Component Value - Date/Time    Blood Culture - Blood, Arm, Left [[id number]] Collected:  04/04/19 2140    Lab Status:  Preliminary result Specimen:  Blood from Arm, Left Updated:  04/08/19 2200     Blood Culture No growth at 4 days    Blood Culture - Blood, Arm, Right [[id number]] Collected:  04/04/19 2150    Lab Status:  Preliminary result Specimen:  Blood from Arm, Right Updated:  04/08/19 2200     Blood Culture No growth at 4 days    MRSA Screen, PCR - Swab, Nares [[id number]]  (Normal) Collected:  04/05/19 1053    Lab Status:  Final result Specimen:  Swab from Nares Updated:  04/05/19 1229     MRSA, PCR Negative    Narrative:       MRSA Negative          Imaging Results (last 24 hours)     ** No results found for the last 24 hours. **               I have reviewed the medications:    Current Facility-Administered Medications:   •  acetaminophen (TYLENOL) tablet 650 mg, 650 mg, Oral, Q4H PRN, 650 mg at 04/05/19 1219 **OR** acetaminophen (TYLENOL) suppository 650 mg, 650 mg, Rectal, Q4H PRN, Uri Poole, APRN  •  acetaminophen (TYLENOL) tablet 500 mg, 500 mg, Oral, TID, Latonia Verma, APRN  •  aspirin chewable tablet 81 mg, 81 mg, Oral, Daily, Magdi Wood MD, 81 mg  at 04/09/19 0841  •  atorvastatin (LIPITOR) tablet 40 mg, 40 mg, Oral, Nightly, Magdi Wood MD, 40 mg at 04/08/19 2053  •  aztreonam (AZACTAM) 2 g in sodium chloride 0.9 % 100 mL IVPB-MBP, 2 g, Intravenous, Q8H, Uri Poole, APRN, 2 g at 04/09/19 0526  •  castor oil-balsam peru (VENELEX) ointment, , Topical, Q12H, Milly dHz MD  •  clindamycin (CLEOCIN) 600 mg in dextrose 5% 50 mL IVPB (premix), 600 mg, Intravenous, Q8H, Marlen Mendez, [facility], 600 mg at 04/09/19 0843  •  dextrose (D50W) 25 g/ 50mL Intravenous Solution 25 g, 25 g, Intravenous, Q15 Min PRN, Uri Poole APRN  •  dextrose (GLUTOSE) oral gel 15 g, 15 g, Oral, Q15 Min PRN, Uri Poole, JAY  •  digoxin (LANOXIN) tablet 250 mcg, 250 mcg, Oral, Daily, Magdi Wood MD, 250 mcg at 04/09/19 0841  •  enoxaparin (LOVENOX) syringe 30 mg, 30 mg, Subcutaneous, Q24H, Magdi Wood MD, 30 mg at 04/08/19 1236  •  famotidine (PEPCID) tablet 20 mg, 20 mg, Oral, Daily, Magdi Wood MD, 20 mg at 04/09/19 0841  •  glucagon (human recombinant) (GLUCAGEN DIAGNOSTIC) injection 1 mg, 1 mg, Subcutaneous, PRN, Uri Poole APRN  •  HYDROcodone-acetaminophen (NORCO) 5-325 MG per tablet 1 tablet, 1 tablet, Oral, Q6H PRN, Shruthi Sy DO, 1 tablet at 04/09/19 1210  •  HYDROcodone-acetaminophen (NORCO) 5-325 MG per tablet 1 tablet, 1 tablet, Oral, Q6H, Latonia Verma, APRHAILEY  •  insulin lispro (humaLOG) injection 0-9 Units, 0-9 Units, Subcutaneous, 4x Daily With Meals & Nightly, Uri Poole, APRN, 2 Units at 04/08/19 2054  •  ipratropium-albuterol (DUO-NEB) nebulizer solution 3 mL, 3 mL, Nebulization, Q4H PRN, Uri Poole, JAY  •  ipratropium-albuterol (DUO-NEB) nebulizer solution 3 mL, 3 mL, Nebulization, 4x Daily - RT, Uri Poole, APRN, 3 mL at 04/09/19 1258  •  magnesium sulfate 4 gram infusion - Mg less than or equal to 1mg/dL, 4 g, Intravenous, PRN,  Last Rate: 25 mL/hr at 04/05/19 1314, 4 g at 04/05/19 1314 **OR** magnesium sulfate 3 gram infusion (1gm x 3) - Mg 1.1 - 1.5 mg/dL, 1 g, Intravenous, PRN **OR** Magnesium Sulfate 2 gram infusion- Mg 1.6 - 1.9 mg/dL, 2 g, Intravenous, PRN, Magdi Wood MD, Last Rate: 25 mL/hr at 04/07/19 0819, 2 g at 04/07/19 0819  •  metoprolol succinate XL (TOPROL-XL) 24 hr tablet 100 mg, 100 mg, Oral, Daily, Brice Fofana IV, RPH, 100 mg at 04/09/19 0841  •  multivitamin with minerals 1 tablet, 1 tablet, Oral, Daily, Magdi Wood MD, 1 tablet at 04/09/19 0841  •  ondansetron (ZOFRAN) injection 4 mg, 4 mg, Intravenous, Q6H PRN, Uri Poole APRN  •  polyethylene glycol 3350 powder (packet), 17 g, Oral, BID, Magdi Wood MD, 17 g at 04/09/19 0841  •  senna (SENOKOT) tablet 17.2 mg, 2 tablet, Oral, Nightly PRN, Magdi Wood MD  •  sodium chloride 0.9 % flush 10 mL, 10 mL, Intravenous, PRN, Marlen, Kash, DO  •  sodium chloride 0.9 % flush 3 mL, 3 mL, Intravenous, Q12H, Uri Poole APRN, 10 mL at 04/09/19 0842  •  sodium chloride 0.9 % flush 3-10 mL, 3-10 mL, Intravenous, PRN, Uri Poole APRN  •  traZODone (DESYREL) tablet 50 mg, 50 mg, Oral, Nightly PRN, Magdi Wood MD      Assessment/Plan   Assessment / Plan     Active Hospital Problems    Diagnosis POA   • **Acute on chronic hypoxemic/hypercarbic respiratory failure not requiring ventilatory support. Improved (CMS/HCC) [J96.21, J96.22] Yes   • Severe cervical kyphosis presumably due to osteoporosis and compression fractures [M40.202] Yes   • Failure to thrive.  Wheelchair-bound [R62.7] Yes   • Small right traumatic pneumothorax due to fall and multiple right rib fractures [J93.9] Yes   • History of rheumatic heart disease. Data deficit. No echoes [I09.9] Yes     as a child, heart murmur, no recent echos (pt. declines)     • Atrial fibrillation; on Coumadin, Digoxin,  Metoprolol [I48.91] Yes   • Diabetes mellitus without complication (CMS/HCC) [E11.9] Yes     Type 2 or unspecified, not stated as uncontrolled.     • Remote CVA with right hemiparesis.  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 female with history of CAD s/p CABG, remote CVA, afib on coumadin, DM, HTN, HLD, ocular degeneration with blindness, hypothyroid who presents after a fall. On EMS arrival patient was hypoxic. Found to have rib fracture with PNX in the ED and hypoxia improved on BiPAP and then high flow. Patient was admitted to the ICU. Right sided parenchymal infiltrates with associated leukocytosis and she was started initially on aztreonam, doxy and vanc and then transitioned to flagyl, aztreonam and clina. Now currently on clinda and aztreonam. Weaned off high flow o2 and transferred to the floor 4/9 and medicine resumed care     Acute on chronic hypoxic and hypercarbic resp failure   Right PNA   Pneumothorax   Multiple rib fractures   - Transferred out of the ICU and currently on nasal cannula   - Continue aztreonam and clinda 4/4-4/13  - Cont pain medication     Kyphosis   - Pt/Ot as tolerated     CAD s/p CABG   - Cont ASA, statin and metoprolol     Afib   - Was on coumadin; will likely need to stay off anti-coagulation due to risk/benefits   - Continue metoprolol and digoxin     DM   - controlled; cont SSI     History of CVA   - Cont ASA/statin     HTN    - controlled; continue metoprolol      DVT Prophylaxis:  lovenox     Disposition: I expect the patient to be discharged TBD     CODE STATUS:   Code Status and Medical Interventions:   Ordered at: 04/05/19 0936     Limited Support to NOT Include:    Intubation     Code Status:    No CPR     Medical Interventions (Level of Support  Prior to Arrest):    Limited         Electronically signed by Yanni Stafford DO, 04/09/19, 2:15 PM.

## 2019-04-09 NOTE — PROGRESS NOTES
"Palliative Care Progress Note    Date of Admission: 4/4/2019    Code Status:   Current Code Status     Date Active Code Status Order ID Comments User Context       4/5/2019 09:36 No CPR [id number]  Magdi Wood MD Inpatient       Questions for Current Code Status     Question Answer Comment    Code Status No CPR     Medical Interventions (Level of Support Prior to Arrest) Limited     Limited Support to NOT Include Intubation         Subjective:  Patient reports Right chest wall pain, lower rib border. She states that hydrocodone/APAP effective but does not last the 8 hours scheduled.   No dyspnea, anxiety or nausea.     Reviewed current scheduled and prn medications for route, type, dose, and frequency.     •  acetaminophen **OR** acetaminophen  •  dextrose  •  dextrose  •  glucagon (human recombinant)  •  HYDROcodone-acetaminophen  •  ipratropium-albuterol  •  magnesium sulfate **OR** magnesium sulfate in D5W 1g/100mL (PREMIX) **OR** magnesium sulfate  •  ondansetron  •  senna  •  sodium chloride  •  sodium chloride  •  traZODone    Objective:  PPS 30%   /69 (BP Location: Left arm, Patient Position: Lying)   Pulse 86   Temp 98.8 °F (37.1 °C) (Oral)   Resp 20   Ht 177.8 cm (70\")   Wt 50.9 kg (112 lb 3.2 oz)   SpO2 95%   BMI 16.10 kg/m²    Intake & Output (last day)       04/08 0701 - 04/09 0700 04/09 0701 - 04/10 0700    P.O. 120     I.V. (mL/kg) 320 (6.3)     IV Piggyback 300     Total Intake(mL/kg) 740 (14.5)     Urine (mL/kg/hr)      Total Output      Net +740           Urine Unmeasured Occurrence 1 x 1 x    Stool Unmeasured Occurrence  1 x        Lab Results (last 24 hours)     Procedure Component Value Units Date/Time    POC Glucose Once [[id number]]  (Abnormal) Collected:  04/09/19 1708    Specimen:  Blood Updated:  04/09/19 1709     Glucose 206 mg/dL     POC Glucose Once [[id number]]  (Abnormal) Collected:  04/09/19 1150    Specimen:  Blood Updated:  04/09/19 1151     Glucose 144 " mg/dL     POC Glucose Once [[id number]]  (Normal) Collected:  04/09/19 0738    Specimen:  Blood Updated:  04/09/19 0740     Glucose 128 mg/dL     Blood Culture - Blood, Arm, Left [[id number]] Collected:  04/04/19 2140    Specimen:  Blood from Arm, Left Updated:  04/08/19 2200     Blood Culture No growth at 4 days    Blood Culture - Blood, Arm, Right [[id number]] Collected:  04/04/19 2150    Specimen:  Blood from Arm, Right Updated:  04/08/19 2200     Blood Culture No growth at 4 days    POC Glucose Once [[id number]]  (Abnormal) Collected:  04/08/19 2024    Specimen:  Blood Updated:  04/08/19 2025     Glucose 186 mg/dL         Imaging Results (last 24 hours)     ** No results found for the last 24 hours. **          Physical Exam:  Gen: NAD, frail  Skin: warm, dry   Eyes: CARMEN, conjunctiva clear and moist   HEENT: oropharynx clear, moist, neck soft tissue  Resp/thorax: even effort, non labored, CTA, severe cervical kyphosis  CV: RRR   ABD: soft, bowel sounds +, nontender  Ext: no edema, no redness   Neuro: awake, interactive, no myoclonus, Left hemiparesis   Psych: appropriate conversation and mood     Reviewed labs and diagnostic results.   Lab Results   Component Value Date    HGBA1C 6.40 (H) 04/05/2019     Results from last 7 days   Lab Units 04/08/19  0551   WBC 10*3/mm3 8.81   HEMOGLOBIN g/dL 9.4*   HEMATOCRIT % 30.2*   PLATELETS 10*3/mm3 283     Results from last 7 days   Lab Units 04/08/19  0551 04/07/19  0400   SODIUM mmol/L 141 138   POTASSIUM mmol/L 4.0 3.8   CHLORIDE mmol/L 107 106   CO2 mmol/L 28.0 27.0   BUN mg/dL 35* 32*   CREATININE mg/dL 0.92 0.93   CALCIUM mg/dL 8.1* 8.1*   BILIRUBIN mg/dL  --  0.6   ALK PHOS U/L  --  54   ALT (SGPT) U/L  --  51*   AST (SGOT) U/L  --  127*   GLUCOSE mg/dL 114* 134*       Impression: 84 y.o. female with acute hypoxic, hypercapnic respiratory failure, Right pneumothorax, frequent falls    Plan:   Right lower rib border pain - scheduled acetaminophen tid and continue  hydrocodone/APAP 5mg every 6 hours.   Continue prns.   Lidoderm patch for overnight.     Dyspnea - continue to monitor    Plan - [facility] referral    Time: 30 minutes   > 50% time spent in counseling and education concerning current orders for symptom management with patient    Latonia Verma, JAY  [phone]  04/09/19  6:31 PM

## 2019-04-09 NOTE — PLAN OF CARE
Problem: Patient Care Overview  Goal: Plan of Care Review   04/09/19 0109   Coping/Psychosocial   Plan of Care Reviewed With patient   Plan of Care Review   Progress improving       Problem: Palliative Care (Adult)  Goal: Identify Related Risk Factors and Signs and Symptoms   04/09/19 0109   Palliative Care (Adult)   Palliative Care: Related Risk Factors advanced age   Palliative Care: Signs and Symptoms anxiety       Problem: Patient Care Overview  Goal: Plan of Care Review   04/09/19 0109   Coping/Psychosocial   Plan of Care Reviewed With patient   Plan of Care Review   Progress improving

## 2019-04-09 NOTE — PLAN OF CARE
Problem: Patient Care Overview  Goal: Interprofessional Rounds/Family Conf   04/09/19 1344   Interdisciplinary Rounds/Family Conf   Summary Palliative Care Interdisciplinary Rounds - Palliative Care Team members present: MARYANN Bridges DO; JO ANN THOMPSON; LINDSEY Sarmiento RN, PN; LOREE Ortega RN, CHPN; LOREE [facility], [facility]; LOREE Muñoz RN, PN; LOREE Escalona MDiv, [facility]   Interdisciplinary Rounds/Family Conf   Participants advanced practice nurse;nursing;pastoral care;physician;social work       Problem: Palliative Care (Adult)  Intervention: Support/Optimize Psychosocial Response   04/09/19 1041   Coping/Psychosocial Interventions   Supportive Measures active listening utilized;positive reinforcement provided;verbalization of feelings encouraged;other (see comments)  (symptom assessment)   Visit with patient and grandtr at bedside.  Patient c/o severe pain - palliative medicine JO ANN THOMPSON to assess and adjust medicine regimen for effective symptom control.  Patient not eating - pt states she has no appetite but she is thirsty and drinking.

## 2019-04-10 NOTE — THERAPY TREATMENT NOTE
Acute Care - Occupational Therapy Treatment Note  [facility]     Patient Name: Kristin Magdaleno  : 1935  MRN: [id number]  Today's Date: 4/10/2019  Onset of Illness/Injury or Date of Surgery: 19  Date of Referral to OT: 19  Referring Physician: MD Wood    Admit Date: 2019       ICD-10-CM ICD-9-CM   1. Acute on chronic respiratory failure with hypoxia and hypercapnia (CMS/HCC) J96.21 518.84    J96.22 786.09     799.02   2. Pneumothorax on right J93.9 512.89   3. Opacity of lung on imaging study R91.8 793.19   4. Injury of head, initial encounter S09.90XA 959.01   5. Abrasion of scalp, initial encounter S00.01XA 910.0   6. Anticoagulated by anticoagulation treatment Z79.01 V58.61   7. History of atrial fibrillation Z86.79 V12.59   8. Impaired mobility and ADLs Z74.09 799.89   9. Impaired functional mobility, balance, gait, and endurance Z74.09 V49.89   10. 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 Data deficit. No echoes   • Multiple closed fractures of ribs of left side   • Heart murmur   • Subcutaneous emphysema (CMS/HCC)   • Contusion of left lower extremity   • Renal cyst   • Lower extremity edema   • Chronic anticoagulation   • Small right traumatic pneumothorax due to fall and multiple right rib fractures   • Acute on chronic mixed respiratory acidosis   • Acute on chronic hypoxemic/hypercarbic respiratory failure not requiring ventilatory support. Improved (CMS/HCC)   • Severe cervical kyphosis presumably due to osteoporosis and compression fractures   • Failure to thrive.  Wheelchair-bound     Past Medical History:   Diagnosis Date   • Atrial  fibrillation (CMS/HCC)    • CAD (coronary artery disease)     s/p cabg   • Chronic anticoagulation    • Diabetes mellitus (CMS/HCC)     type II    • H/O hemiparesis     Rt sided    • Heart murmur     RHF as child.  no recent echos (pt declines)   • Hyperlipidemia    • Hypertension    • Hypothyroid    • Rheumatic heart disease     as a child, heart murmur, no recent echos (pt. declines)   • Stroke (CMS/HCC)     20+ years ago in OR during CABG      Past Surgical History:   Procedure Laterality Date   • APPENDECTOMY     • BREAST SURGERY      no breast tissue, never had tara, mel. breast implants   • CORONARY ARTERY BYPASS GRAFT  1994    CVA  and a fib post-op - on coumadin since   • TOTAL HIP ARTHROPLASTY REVISION Right 1998       Therapy Treatment    Rehabilitation Treatment Summary     Row Name 04/10/19 0750             Treatment Time/Intention    Discipline  occupational therapist  -KF      Document Type  therapy note (daily note)  -KF      Subjective Information  complains of;weakness;fatigue;pain  -KF      Mode of Treatment  occupational therapy  -KF      Patient/Family Observations  granddaughter present at end  -KF      Care Plan Review  evaluation/treatment results reviewed;care plan/treatment goals reviewed;risks/benefits reviewed;current/potential barriers reviewed;patient/other agree to care plan  -KF      Care Plan Review, Other Participant(s)  family  -KF      Patient Effort  good  -KF      Existing Precautions/Restrictions  fall;oxygen therapy device and L/min;other (see comments) R hemiparesis  -KF      Treatment Considerations/Comments  R hemiparesis  -KF      Patient Response to Treatment  tolerated  -KF      Recorded by [KF] Leeanna Troy OT 04/10/19 0845      Row Name 04/10/19 0756             Vital Signs    Pre Systolic BP Rehab  122  -KF      Pre Treatment Diastolic BP  58 RN cleared vitals stable   -KF      Posttreatment Heart Rate (beats/min)  91  -KF      Pre SpO2 (%)  96  -KF      O2  Delivery Pre Treatment  supplemental O2  -KF      Post SpO2 (%)  94  -KF      O2 Delivery Post Treatment  supplemental O2  -KF      Pre Patient Position  Supine  -KF      Intra Patient Position  Side Lying  -KF      Post Patient Position  Sitting  -KF      Recorded by [KF] Leeanna Troy OT 04/10/19 0845      Row Name 04/10/19 0750             Cognitive Assessment/Intervention    Additional Documentation  Cognitive Assessment/Intervention (Group)  -KF      Recorded by [KF] Leeanna Troy OT 04/10/19 0845      Row Name 04/10/19 0750             Cognitive Assessment/Intervention- PT/OT    Affect/Mental Status (Cognitive)  WFL  -KF      Orientation Status (Cognition)  oriented to;person;place;situation  -KF      Follows Commands (Cognition)  follows one step commands;over 90% accuracy;verbal cues/prompting required  -KF      Safety Deficit (Cognitive)  mild deficit;judgment;insight into deficits/self awareness;awareness of need for assistance  -KF      Cognitive Interventions (Cognitive)  process/task specific training;occupation/activity based interventions  -KF      Personal Safety Interventions  fall prevention program maintained;muscle strengthening facilitated;nonskid shoes/slippers when out of bed  -KF      Recorded by [KF] Leeanna Troy, OT 04/10/19 0845      Row Name 04/10/19 0750             Safety Issues, Functional Mobility    Safety Issues Affecting Function (Mobility)  sequencing abilities;safety precaution awareness;judgment;insight into deficits/self awareness;awareness of need for assistance  -KF      Impairments Affecting Function (Mobility)  balance;postural/trunk control;range of motion (ROM);strength;muscle tone abnormal;endurance/activity tolerance;visual/perceptual  -KF      Comment, Safety Issues/Impairments (Mobility)  neck held in flexion, R side flaccid UE, visual deficits from macular degeneration  -KF      Recorded by [KF] Leeanna Troy OT 04/10/19 0845      Row Name  04/10/19 0750             Bed Mobility Assessment/Treatment    Bed Mobility Assessment/Treatment  rolling left;rolling right  -KF      Rolling Left [place] (Bed Mobility)  verbal cues;dependent (less than 25% patient effort)  -KF      Rolling Right [place] (Bed Mobility)  maximum assist (25% patient effort)  -KF      Bed Mobility, Safety Issues  decreased use of arms for pushing/pulling;decreased use of legs for bridging/pushing;impaired trunk control for bed mobility  -KF      Assistive Device (Bed Mobility)  bed rails;draw sheet;head of bed elevated  -KF      Recorded by [KF] Leeanna Troy, OT 04/10/19 0845      Row Name 04/10/19 [facility]0             Functional Mobility    Functional Mobility- Ind. Level  not tested  -KF      Recorded by [KF] Leeanna Troy, OT 04/10/19 0845      Row Name 04/10/19 0750             Transfer Assessment/Treatment    Transfer Assessment/Treatment  bed-chair transfer  -KF      Recorded by [KF] Leeanna Troy, OT 04/10/19 0845      Row Name 04/10/19 [facility]0             Bed-Chair Transfer    Bed-Chair [place] (Transfers)  dependent (less than 25% patient effort);2 person assist  -KF      Assistive Device (Bed-Chair Transfers)  mechanical lift/aid  -KF      Recorded by [KF] Leeanna Troy, OT 04/10/19 0845      Row Name 04/10/19 [facility]0             ADL Assessment/Intervention    BADL Assessment/Intervention  feeding  -KF      Recorded by [KF] Leeanna Troy, OT 04/10/19 0845      Row Name 04/10/19 [facility]0             Self-Feeding Assessment/Training    [place] Level (Feeding)  prepare tray/open items;knife use;dependent (less than 25% patient effort);scoop food and bring to mouth;maximum assist (25% patient effort);finger foods;minimum assist (75% patient effort)  -KF      Assistive Devices (Feeding)  adapted cup  -KF      Self-Feeding Assess/Train, Spillage Amount  minimal  -KF      Position (Self-Feeding)  supported sitting  -KF      Comment (Feeding)   provided set up and postural set up and alignment to allow for improved self-feeding   -KF      Recorded by [KF] Leeanna Troy, OT 04/10/19 0845      Row Name 04/10/19 0750             BADL Safety/Performance    Impairments, BADL Safety/Performance  balance;endurance/activity tolerance;grasp/prehension;motor control;motor planning;muscle tone abnormality;range of motion;strength;trunk/postural control;visual/perceptual  -KF      Skilled BADL Treatment/Intervention  BADL process/adaptation training;cognitive/safety deficit modifications  -KF      Recorded by [KF] Leeanna Troy, OT 04/10/19 0845      Row Name 04/10/19 0750             Motor Skills Assessment/Interventions    Additional Documentation  Balance (Group);Balance Interventions (Group)  -KF      Recorded by [KF] Leeanna Troy, OT 04/10/19 0845      Row Name 04/10/19 0750             Balance    Balance  static sitting balance  -KF      Recorded by [KF] Leeanna Troy, OT 04/10/19 0845      Row Name 04/10/19 0750             Static Sitting Balance    Level of [place] (Supported Sitting, Static Balance)  supervision;minimal assist, 75% patient effort  -KF      Sitting Position (Supported Sitting, Static Balance)  sitting in chair  -KF      Time Able to Maintain Position (Supported Sitting, Static Balance)  more than 5 minutes  -KF      Comment (Supported Sitting, Static Balance)  supported sitting, min A to keep neck extended and maintain pillow placement   -KF      Recorded by [KF] Leeanna Troy, OT 04/10/19 0845      Row Name 04/10/19 0750             Positioning and Restraints    Pre-Treatment Position  in bed  -KF      Post Treatment Position  chair  -KF      In Chair  notified nsg;reclined;sitting;call light within reach;encouraged to call for assist;with family/caregiver;with nsg;heels elevated;waffle boot/both;legs elevated;waffle cushion;on mechanical lift sling  -KF      Recorded by [KF] Leeanna Troy, OT 04/10/19 0897       Row Name 04/10/19 0750             Pain Assessment    Additional Documentation  Pain Scale: Numbers Pre/Post-Treatment (Group)  -KF      Recorded by [KF] Leeanna Troy, OT 04/10/19 0845      Row Name 04/10/19 0750             Pain Scale: Numbers Pre/Post-Treatment    Pain Scale: Numbers, Pretreatment  5/10  -KF      Pain Scale: Numbers, Post-Treatment  5/10  -KF      Pain Location - Side  Right  -KF      Pain Location - Orientation  generalized  -KF      Pain Location  -- rib  -KF      Pain Intervention(s)  Repositioned  -KF      Recorded by [KF] Leeanna Troy, OT 04/10/19 0845      Row Name 04/10/19 0750             Vision Assessment/Intervention    Visual Impairment/Limitations  legally blind  -KF      Recorded by [KF] Leeanna Troy, OT 04/10/19 0845      Row Name                Wound 04/05/19 0030 Right medial gluteal    Wound - Properties Group Date first assessed: 04/05/19 [MR] Time first assessed: 0030 [MR] Present On Admission : yes [MR] Side: Right [MR] Orientation: medial [MR] Location: gluteal [MR] Stage, Pressure Injury: Stage 3 [MR] Recorded by:  [MR] GonzalezJulia lagos RN 04/05/19 1016    Row Name 04/10/19 0750             Plan of Care Review    Plan of Care Reviewed With  patient;grandchild(ni)  -KF      Recorded by [KF] Leeanna Troy, OT 04/10/19 0845      Row Name 04/10/19 0750             Outcome Summary/Treatment Plan (OT)    Daily Summary of Progress (OT)  progress toward functional goals is gradual  -KF      Recorded by [KF] Leeanna Troy, OT 04/10/19 0860        User Key  (r) = Recorded By, (t) = Taken By, (c) = Cosigned By    Initials Name Effective Dates Discipline    MR Gonzalez Jaquelin, RN 06/16/16 -  Nurse    Leeanna Aaron, OT 04/03/18 -  OT        Wound 04/05/19 0030 Right medial gluteal (Active)   Dressing Appearance dry;intact 4/9/2019  8:00 PM   Closure KIM 4/9/2019  8:00 PM   Base dressing in place, unable to visualize 4/9/2019  8:00 PM        Occupational Therapy Education     Title: PT OT SLP Therapies (In Progress)     Topic: Occupational Therapy (In Progress)     Point: ADL training (In Progress)     Description: Instruct learner(s) on proper safety adaptation and remediation techniques during self care or transfers.   Instruct in proper use of assistive devices.    Learning Progress Summary           Patient Acceptance, E,TB,D, NR by KF at 4/10/2019  7:50 AM    Comment:  Future use of rocker knife, comp strategies for self-feeding, set up and AE for self-feeding    Acceptance, E, NR by PD at 4/10/2019  1:54 AM    Acceptance, E, NR by CL at 4/8/2019  3:47 PM    Comment:  Pt educated on appropriate safety precautions, t/f techniques, role of OT, and benefits of therapy.    Acceptance, E, NR by CL at 4/5/2019  2:33 PM    Comment:  Pt educated on appropriate safeyt precautions, role of OT, positioning, and benefits of therapy.   Family Acceptance, E,TB,D, NR by KF at 4/10/2019  7:50 AM    Comment:  Future use of rocker knife, comp strategies for self-feeding, set up and AE for self-feeding    Acceptance, E, NR by CL at 4/5/2019  2:33 PM    Comment:  Pt educated on appropriate safeyt precautions, role of OT, positioning, and benefits of therapy.                   Point: Home exercise program (In Progress)     Description: Instruct learner(s) on appropriate technique for monitoring, assisting and/or progressing therapeutic exercises/activities.    Learning Progress Summary           Patient Acceptance, E, NR by PD at 4/10/2019  1:54 AM    Acceptance, E, NR by CL at 4/8/2019  3:47 PM    Comment:  Pt educated on appropriate safety precautions, t/f techniques, role of OT, and benefits of therapy.                   Point: Precautions (In Progress)     Description: Instruct learner(s) on prescribed precautions during self-care and functional transfers.    Learning Progress Summary           Patient Acceptance, E,TB,D, NR by KF at 4/10/2019  7:50 AM     Comment:  Future use of rocker knife, comp strategies for self-feeding, set up and AE for self-feeding    Acceptance, E, NR by PD at 4/10/2019  1:54 AM    Acceptance, E, NR by CL at 4/8/2019  3:47 PM    Comment:  Pt educated on appropriate safety precautions, t/f techniques, role of OT, and benefits of therapy.    Acceptance, E, NR by CL at 4/5/2019  2:33 PM    Comment:  Pt educated on appropriate safeyt precautions, role of OT, positioning, and benefits of therapy.   Family Acceptance, E,TB,D, NR by KF at 4/10/2019  7:50 AM    Comment:  Future use of rocker knife, comp strategies for self-feeding, set up and AE for self-feeding    Acceptance, E, NR by CL at 4/5/2019  2:33 PM    Comment:  Pt educated on appropriate safeyt precautions, role of OT, positioning, and benefits of therapy.                   Point: Body mechanics (In Progress)     Description: Instruct learner(s) on proper positioning and spine alignment during self-care, functional mobility activities and/or exercises.    Learning Progress Summary           Patient Acceptance, E,TB,D, NR by KF at 4/10/2019  7:50 AM    Comment:  Future use of rocker knife, comp strategies for self-feeding, set up and AE for self-feeding    Acceptance, E, NR by PD at 4/10/2019  1:54 AM    Acceptance, E, NR by CL at 4/8/2019  3:47 PM    Comment:  Pt educated on appropriate safety precautions, t/f techniques, role of OT, and benefits of therapy.    Acceptance, E, NR by CL at 4/5/2019  2:33 PM    Comment:  Pt educated on appropriate safeyt precautions, role of OT, positioning, and benefits of therapy.   Family Acceptance, E,TB,D, NR by KF at 4/10/2019  7:50 AM    Comment:  Future use of rocker knife, comp strategies for self-feeding, set up and AE for self-feeding    Acceptance, E, NR by CL at 4/5/2019  2:33 PM    Comment:  Pt educated on appropriate safeyt precautions, role of OT, positioning, and benefits of therapy.                               User Key     Initials  Effective Dates Name Provider Type Discipline    CL 04/03/18 -  Regina Barnhart, OT Occupational Therapist OT    PD 05/30/17 -  [place] RN Registered Nurse Nurse    KF 04/03/18 -  Leeanna Troy OT Occupational Therapist OT                OT Recommendation and Plan  Outcome Summary/Treatment Plan (OT)  Daily Summary of Progress (OT): progress toward functional goals is gradual  Daily Summary of Progress (OT): progress toward functional goals is gradual  Plan of Care Review  Plan of Care Reviewed With: patient, grandchild(ni)  Plan of Care Reviewed With: patient, grandchild(ni)  Outcome Summary: Pt dependent transfer x2 assist via mechanical lift to chair to provide postural support for sitting to improve participation in self-feeding, max A self-feeding and dependent set up of meal; cont IPOT per POC  Outcome Measures     Row Name 04/10/19 0750 04/08/19 1437 04/08/19 1422       How much help from another person do you currently need...    Turning from your back to your side while in flat bed without using bedrails?  --  2  -KR  --    Moving from lying on back to sitting on the side of a flat bed without bedrails?  --  2  -KR  --    Moving to and from a bed to a chair (including a wheelchair)?  --  1  -KR  --    Standing up from a chair using your arms (e.g., wheelchair, bedside chair)?  --  1  -KR  --    Climbing 3-5 steps with a railing?  --  1  -KR  --    To walk in hospital room?  --  1  -KR  --    AM-PAC 6 Clicks Score  --  8  -KR  --       How much help from another is currently needed...    Putting on and taking off regular lower body clothing?  1  -KF  --  1  -CL    Bathing (including washing, rinsing, and drying)  1  -KF  --  1  -CL    Toileting (which includes using toilet bed pan or urinal)  1  -KF  --  1  -CL    Putting on and taking off regular upper body clothing  2  -KF  --  2  -CL    Taking care of personal grooming (such as brushing teeth)  2  -KF  --  2  -CL    Eating meals  2  -KF  --   2  -CL    Score  9  -KF  --  9  -CL       Functional Assessment    Outcome Measure Options  AM-PAC 6 Clicks Daily Activity (OT)  -KF  AM-PAC 6 Clicks Basic Mobility (PT)  -KR  AM-PAC 6 Clicks Daily Activity (OT)  -CL      User Key  (r) = Recorded By, (t) = Taken By, (c) = Cosigned By    Initials Name Provider Type    CL Regina Barnhart, OT Occupational Therapist    KR Elvira Gil, PT Physical Therapist    KF Leeanna Troy, OT Occupational Therapist           Time Calculation:   Time Calculation- OT     Row Name 04/10/19 0750             Time Calculation- OT    OT Start Time  0750  -KF      Total Timed Code Minutes- OT  29 minute(s)  -KF      OT Received On  04/10/19  -KF      OT Goal Re-Cert Due Date  04/15/19  -KF         Timed Charges    48042 - OT Therapeutic Activity Minutes  9  -KF      33884 - OT Self Care/Mgmt Minutes  20  -KF        User Key  (r) = Recorded By, (t) = Taken By, (c) = Cosigned By    Initials Name Provider Type    KF Leeanna Troy, OT Occupational Therapist        Therapy Charges for Today     Code Description Service Date Service Provider Modifiers Qty    45747822307 HC OT THERAPEUTIC ACT EA 15 MIN 4/10/2019 Leeanna Troy OT GO 1    18320936817 HC OT SELF CARE/MGMT/TRAIN EA 15 MIN 4/10/2019 Leeanna Troy OT GO 1    56983995097 HC OT THER SUPP EA 15 MIN 4/10/2019 Leeanna Troy OT GO 1               Leeanna Troy OT  4/10/2019

## 2019-04-10 NOTE — PLAN OF CARE
Problem: Fall Risk (Adult)  Goal: Identify Related Risk Factors and Signs and Symptoms  Outcome: Ongoing (interventions implemented as appropriate)      Problem: Skin Injury Risk (Adult)  Goal: Identify Related Risk Factors and Signs and Symptoms  Outcome: Ongoing (interventions implemented as appropriate)    Goal: Skin Health and Integrity  Outcome: Ongoing (interventions implemented as appropriate)      Problem: Patient Care Overview  Goal: Plan of Care Review  Outcome: Ongoing (interventions implemented as appropriate)   04/10/19 4701   OTHER   Outcome Summary Vital signs wnl. Oxygen level greater than 90% on 3 liters, Patient possibly going to rehab tommorrow.    Coping/Psychosocial   Plan of Care Reviewed With patient;grandchild(ni)   Plan of Care Review   Progress improving

## 2019-04-10 NOTE — PLAN OF CARE
Problem: Patient Care Overview  Goal: Plan of Care Review  Outcome: Ongoing (interventions implemented as appropriate)   04/10/19 1233   OTHER   Outcome Summary Able to dianna sit.bal activ & ther ex all extrem & neck w/ mult rests, but limited by pain R ribcage (RIB fxs & PNX) & RUE, fatigue, poor head control d/t cerv. kyphosis & prev. comp.fxs, low HGB (9.4), & poor inspiratory effort w/ respirex   Coping/Psychosocial   Plan of Care Reviewed With patient;grandchild(ni)   Plan of Care Review   Progress improving

## 2019-04-10 NOTE — THERAPY TREATMENT NOTE
Acute Care - Physical Therapy Treatment Note  [facility]     Patient Name: Kristin Magdaleno  : 1935  MRN: [id number]  Today's Date: 4/10/2019  Onset of Illness/Injury or Date of Surgery: 19  Date of Referral to PT: 19  Referring Physician: MD Wood    Admit Date: 2019    Visit Dx:    ICD-10-CM ICD-9-CM   1. Acute on chronic respiratory failure with hypoxia and hypercapnia (CMS/HCC) J96.21 518.84    J96.22 786.09     799.02   2. Pneumothorax on right J93.9 512.89   3. Opacity of lung on imaging study R91.8 793.19   4. Injury of head, initial encounter S09.90XA 959.01   5. Abrasion of scalp, initial encounter S00.01XA 910.0   6. Anticoagulated by anticoagulation treatment Z79.01 V58.61   7. History of atrial fibrillation Z86.79 V12.59   8. Impaired mobility and ADLs Z74.09 799.89   9. Impaired functional mobility, balance, gait, and endurance Z74.09 V49.89   10. 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 Data deficit. No echoes   • Multiple closed fractures of ribs of left side   • Heart murmur   • Subcutaneous emphysema (CMS/HCC)   • Contusion of left lower extremity   • Renal cyst   • Lower extremity edema   • Chronic anticoagulation   • Small right traumatic pneumothorax due to fall and multiple right rib fractures   • Acute on chronic mixed respiratory acidosis   • Acute on chronic hypoxemic/hypercarbic respiratory failure not requiring ventilatory support. Improved (CMS/HCC)   • Severe cervical kyphosis presumably due to osteoporosis and compression fractures   • Failure to thrive.  Wheelchair-bound       Therapy Treatment    Rehabilitation  Treatment Summary     Row Name 04/10/19 1149 04/10/19 0750          Treatment Time/Intention    Discipline  physical therapist  -DM  occupational therapist  -KF     Document Type  therapy note (daily note)  -DM  therapy note (daily note)  -KF     Subjective Information  complains of;weakness;fatigue;pain  -DM  complains of;weakness;fatigue;pain  -KF     Mode of Treatment  individual therapy;physical therapy  -DM  occupational therapy  -KF     Patient/Family Observations  [facility] PER OT w/sling;tele,iv,o2,waff boots,waff cush,exit alarm; head flexed forward(chin resting on neck pillow);RUE in syeda posturing;granddtr present  -DM  granddaughter present at end  -KF     Care Plan Review  care plan/treatment goals reviewed;patient/other agree to care plan  -DM  evaluation/treatment results reviewed;care plan/treatment goals reviewed;risks/benefits reviewed;current/potential barriers reviewed;patient/other agree to care plan  -KF     Care Plan Review, Other Participant(s)  family  -DM  family  -KF     Therapy Frequency (PT Clinical Impression)  daily  -DM  --     Patient Effort  good  -DM  good  -KF     Existing Precautions/Restrictions  fall;oxygen therapy device and L/min;other (see comments) residual R HP(remote CVA);cerv.kyphosis/neck pillow  -DM  fall;oxygen therapy device and L/min;other (see comments) R hemiparesis  -KF     Treatment Considerations/Comments  R PNX & R rib fxs;prev.comp.fxs  -DM  R hemiparesis  -KF     Patient Response to Treatment  --  tolerated  -KF     Recorded by [DM] Sara Cortés, PT 04/10/19 1232 [KF] Leeanna Troy, OT 04/10/19 0845     Row Name 04/10/19 1149 04/10/19 0750          Vital Signs    Pre Systolic BP Rehab  121  -DM  122  -KF     Pre Treatment Diastolic BP  61  -DM  58 RN cleared vitals stable   -KF     Post Systolic BP Rehab  124  -DM  --     Post Treatment Diastolic BP  70  -DM  --     Pretreatment Heart Rate (beats/min)  81  -DM  --     Intratreatment Heart Rate (beats/min)   84  -DM  --     Posttreatment Heart Rate (beats/min)  77  -DM  91  -KF     Pre SpO2 (%)  99  -DM  96  -KF     O2 Delivery Pre Treatment  supplemental O2  -DM  supplemental O2  -KF     Intra SpO2 (%)  98  -DM  --     O2 Delivery Intra Treatment  supplemental O2  -DM  --     Post SpO2 (%)  94  -DM  94  -KF     O2 Delivery Post Treatment  supplemental O2  -DM  supplemental O2  -KF     Pre Patient Position  Sitting  -DM  Supine  -KF     Intra Patient Position  Sitting  -DM  Side Lying  -KF     Post Patient Position  Sitting respirex 200 cc  -DM  Sitting  -KF     Recorded by [DM] Sara Cortés, PT 04/10/19 1232 [KF] Leeanna Troy, OT 04/10/19 0845     Row Name 04/10/19 1149 04/10/19 0750          Cognitive Assessment/Intervention    Additional Documentation  Cognitive Assessment/Intervention (Group)  -DM  Cognitive Assessment/Intervention (Group)  -KF     Recorded by [DM] Sara Cortés, PT 04/10/19 1232 [KF] Leeanna Troy, OT 04/10/19 0845     Row Name 04/10/19 1149 04/10/19 0750          Cognitive Assessment/Intervention- PT/OT    Affect/Mental Status (Cognitive)  WFL  -DM  WFL  -KF     Orientation Status (Cognition)  oriented to;person;place;situation  -DM  oriented to;person;place;situation  -KF     Follows Commands (Cognition)  follows one step commands;over 90% accuracy;repetition of directions required;verbal cues/prompting required  -DM  follows one step commands;over 90% accuracy;verbal cues/prompting required  -KF     Cognitive Function (Cognitive)  safety deficit  -DM  --     Safety Deficit (Cognitive)  mild deficit;insight into deficits/self awareness  -DM  mild deficit;judgment;insight into deficits/self awareness;awareness of need for assistance  -KF     Cognitive Interventions (Cognitive)  --  process/task specific training;occupation/activity based interventions  -KF     Personal Safety Interventions  fall prevention program maintained  -DM  fall prevention program maintained;muscle  strengthening facilitated;nonskid shoes/slippers when out of bed  -KF     Recorded by [DM] Sara Cortés, PT 04/10/19 1232 [KF] Leeanna Troy, OT 04/10/19 0845     Row Name 04/10/19 1149 04/10/19 0750          Safety Issues, Functional Mobility    Safety Issues Affecting Function (Mobility)  --  sequencing abilities;safety precaution awareness;judgment;insight into deficits/self awareness;awareness of need for assistance  -KF     Impairments Affecting Function (Mobility)  balance;cognition;endurance/activity tolerance;pain;postural/trunk control;strength  -DM  balance;postural/trunk control;range of motion (ROM);strength;muscle tone abnormal;endurance/activity tolerance;visual/perceptual  -KF     Comment, Safety Issues/Impairments (Mobility)  --  neck held in flexion, R side flaccid UE, visual deficits from macular degeneration  -KF     Recorded by [DM] Sara Cortés, PT 04/10/19 1232 [KF] Leeanna Troy, OT 04/10/19 0845     Row Name 04/10/19 1149 04/10/19 0750          Bed Mobility Assessment/Treatment    Bed Mobility Assessment/Treatment  --  rolling left;rolling right  -KF     Rolling Left [place] (Bed Mobility)  --  verbal cues;dependent (less than 25% patient effort)  -KF     Rolling Right [place] (Bed Mobility)  --  maximum assist (25% patient effort)  -KF     Bed Mobility, Safety Issues  --  decreased use of arms for pushing/pulling;decreased use of legs for bridging/pushing;impaired trunk control for bed mobility  -KF     Assistive Device (Bed Mobility)  --  bed rails;draw sheet;head of bed elevated  -KF     Comment (Bed Mobility)  [facility]  -  --     Recorded by [DM] Sara Cortés, PT 04/10/19 1232 [KF] Leeanna Troy, OT 04/10/19 0845     Row Name 04/10/19 0750             Functional Mobility    Functional Mobility- Ind. Level  not tested  -KF      Recorded by [KF] Leeanna Troy, OT 04/10/19 0845      Row Name 04/10/19 1149 04/10/19 0750          Transfer  Assessment/Treatment    Transfer Assessment/Treatment  --  bed-chair transfer  -KF     Comment (Transfers)  decl. transf (currently [facility] W/sling);does not stand/pivot  -DM  --     Recorded by [DM] Sara Cortés, PT 04/10/19 1232 [KF] Leeanna Troy, OT 04/10/19 0845     Row Name 04/10/19 0750             Bed-Chair Transfer    Bed-Chair [place] (Transfers)  dependent (less than 25% patient effort);2 person assist  -KF      Assistive Device (Bed-Chair Transfers)  mechanical lift/aid  -KF      Recorded by [KF] Leeanna Troy, OT 04/10/19 0845      Row Name 04/10/19 1149             Gait/Stairs Assessment/Training    [place] Level (Gait)  unable to assess  -DM      Comment (Gait/Stairs)  non-amb.(W/C bound)  -DM      Recorded by [DM] Sara Cortés, PT 04/10/19 1232      Row Name 04/10/19 0750             ADL Assessment/Intervention    BADL Assessment/Intervention  feeding  -KF      Recorded by [KF] Leeanna Troy, OT 04/10/19 0845      Row Name 04/10/19 0750             Self-Feeding Assessment/Training    [place] Level (Feeding)  prepare tray/open items;knife use;dependent (less than 25% patient effort);scoop food and bring to mouth;maximum assist (25% patient effort);finger foods;minimum assist (75% patient effort)  -KF      Assistive Devices (Feeding)  adapted cup  -KF      Self-Feeding Assess/Train, Spillage Amount  minimal  -KF      Position (Self-Feeding)  supported sitting  -KF      Comment (Feeding)  provided set up and postural set up and alignment to allow for improved self-feeding   -KF      Recorded by [KF] Leeanna Troy, OT 04/10/19 0845      Row Name 04/10/19 0750             BADL Safety/Performance    Impairments, BADL Safety/Performance  balance;endurance/activity tolerance;grasp/prehension;motor control;motor planning;muscle tone abnormality;range of motion;strength;trunk/postural control;visual/perceptual  -KF      Skilled BADL Treatment/Intervention  BADL  process/adaptation training;cognitive/safety deficit modifications  -KF      Recorded by [KF] Leeanna Troy, OT 04/10/19 0845      Row Name 04/10/19 1149 04/10/19 0750          Motor Skills Assessment/Interventions    Additional Documentation  Balance (Group);Balance Interventions (Group);Therapeutic Exercise (Group);Therapeutic Exercise Interventions (Group)  -DM  Balance (Group);Balance Interventions (Group)  -KF     Recorded by [DM] Sara Cortés, PT 04/10/19 1232 [KF] Leeanna Troy, OT 04/10/19 0845     Row Name 04/10/19 1149             Therapeutic Exercise    86723 - PT Therapeutic Exercise Minutes  24  -DM      Recorded by [DM] Sara Cortés, PT 04/10/19 1232      Row Name 04/10/19 1149             Therapeutic Exercise    Upper Extremity Range of Motion (Therapeutic Exercise)  shoulder flexion/extension, bilateral;shoulder abduction/adduction, bilateral;shoulder horizontal abduction/adduction, bilateral;shoulder internal/external rotation, bilateral;elbow flexion/extension, bilateral;forearm supination/pronation, bilateral;wrist flexion/extension, bilateral A/AROM LUE;PROM RUE  -DM      Lower Extremity (Therapeutic Exercise)  gastroc stretch, right;gluteal sets;hamstring sets, left;hamstring stretch, right;heel slides, left;LAQ (long arc quad), left;quad sets, left;SAQ (short arc quad), left;SLR (straight leg raise), left  -DM      Lower Extremity Range of Motion (Therapeutic Exercise)  hip flexion/extension, right;hip abduction/adduction, bilateral;hip internal/external rotation, bilateral;knee flexion/extension, right;ankle dorsiflexion/plantar flexion, bilateral L BKFO;ABDOM sets;A TOAAROM LLE;PROM RLE(A/A AP)  -DM      Exercise Type (Therapeutic Exercise)  AAROM (active assistive range of motion);AROM (active range of motion);PROM (passive range of motion);isometric contraction, static;static stretching  -DM      Position (Therapeutic Exercise)  seated  -DM      Sets/Reps (Therapeutic  Exercise)  1/10  -DM      Comment (Therapeutic Exercise)  ALSO A/AROM NECK exer;RESTS btwn sets(fatigues quickly,dandre. w/ cerv ROM)  -DM      Recorded by [DM] Sara Cortés, PT 04/10/19 1232      Row Name 04/10/19 1149 04/10/19 0750          Balance    Balance  static sitting balance;dynamic sitting balance  -DM  static sitting balance  -KF     Recorded by [DM] Sara Cortés, PT 04/10/19 1232 [KF] Leeanna Troy, OT 04/10/19 0845     Row Name 04/10/19 1149 04/10/19 0750          Static Sitting Balance    Level of [place] (Unsupported Sitting, Static Balance)  minimal assist, 75% patient effort  -DM  --     Sitting Position (Unsupported Sitting, Static Balance)  sitting in chair  -DM  --     Time Able to Maintain Position (Unsupported Sitting, Static Balance)  45 to 60 seconds  -DM  --     Comment (Unsupported Sitting, Static Balance)  HELD trunk forward in chair  -DM  --     Level of [place] (Supported Sitting, Static Balance)  --  supervision;minimal assist, 75% patient effort  -KF     Sitting Position (Supported Sitting, Static Balance)  --  sitting in chair  -KF     Time Able to Maintain Position (Supported Sitting, Static Balance)  --  more than 5 minutes  -KF     Comment (Supported Sitting, Static Balance)  --  supported sitting, min A to keep neck extended and maintain pillow placement   -KF     Recorded by [DM] Sara Cortés, PT 04/10/19 1232 [KF] Leeanna Troy, OT 04/10/19 0845     Row Name 04/10/19 1145             Dynamic Sitting Balance    Level of [place], Reaches Outside Midline (Sitting, Dynamic Balance)  moderate assist, 50 to 74% patient effort  -DM      Sitting Position, Reaches Outside Midline (Sitting, Dynamic Balance)  sitting in chair  -DM      Comment, Reaches Outside Midline (Sitting, Dynamic Balance)  WS F/B WHILE holding head erect w/ mod A  -DM      Recorded by [DM] Sara Cortés, PT 04/10/19 1232      Row Name 04/10/19 1149 04/10/19 0750           Positioning and Restraints    Pre-Treatment Position  sitting in chair/recliner  -DM  in bed  -KF     Post Treatment Position  chair  -DM  chair  -KF     In Chair  notified nsg;reclined;call light within reach;encouraged to call for assist;exit alarm on;with family/caregiver;RUE elevated;LUE elevated;waffle cushion;on mechanical lift sling;legs elevated;waffle boot/both  -DM  notified nsg;reclined;sitting;call light within reach;encouraged to call for assist;with family/caregiver;with nsg;heels elevated;waffle boot/both;legs elevated;waffle cushion;on mechanical lift sling  -KF     Recorded by [DM] Sara Cortés, PT 04/10/19 1232 [KF] Leeanna Troy, OT 04/10/19 0845     Row Name 04/10/19 1149 04/10/19 0750          Pain Assessment    Additional Documentation  Pain Scale: Numbers Pre/Post-Treatment (Group)  -DM  Pain Scale: Numbers Pre/Post-Treatment (Group)  -KF     Recorded by [DM] Sara Cortés, PT 04/10/19 1232 [KF] Leeanna Troy, OT 04/10/19 0845     Row Name 04/10/19 1149 04/10/19 0750          Pain Scale: Numbers Pre/Post-Treatment    Pain Scale: Numbers, Pretreatment  6/10  -DM  5/10  -KF     Pain Scale: Numbers, Post-Treatment  1/10  -DM  5/10  -KF     Pain Location - Side  Right  -DM  Right  -KF     Pain Location - Orientation  --  generalized  -KF     Pain Location  --  -- rib  -KF     Pain Intervention(s)  Medication (See MAR);Repositioned;Rest  -DM  Repositioned  -KF     Recorded by [DM] Sara Cortés, PT 04/10/19 1232 [KF] Leeanna Troy, OT 04/10/19 0845     Row Name 04/10/19 0750             Vision Assessment/Intervention    Visual Impairment/Limitations  legally blind  -KF      Recorded by [KF] Leeanna Troy, OT 04/10/19 0845      Row Name                Wound 04/05/19 0030 Right medial gluteal    Wound - Properties Group Date first assessed: 04/05/19 [MR] Time first assessed: 0030 [MR] Present On Admission : yes [MR] Side: Right [MR] Orientation: medial [MR] Location:  gluteal [MR] Stage, Pressure Injury: Stage 3 [MR] Recorded by:  [MR] Julia Gonzalez, RN 04/05/19 1016    Row Name 04/10/19 1149 04/10/19 0750          Plan of Care Review    Plan of Care Reviewed With  patient;grandchild(ni)  -DM  patient;grandchild(ni)  -KF     Recorded by [DM] Sara Cortés, PT 04/10/19 1232 [KF] Leeanna Troy, OT 04/10/19 0845     Row Name 04/10/19 0750             Outcome Summary/Treatment Plan (OT)    Daily Summary of Progress (OT)  progress toward functional goals is gradual  -KF      Recorded by [KF] Leeanna Troy, OT 04/10/19 0845      Row Name 04/10/19 1149             Outcome Summary/Treatment Plan (PT)    Daily Summary of Progress (PT)  progress towards functional goals is fair  -DM      Anticipated Discharge Disposition (PT)  assisted living facility ([facility])  -DM      Recorded by [DM] Sara Cortés, PT 04/10/19 1232        User Key  (r) = Recorded By, (t) = Taken By, (c) = Cosigned By    Initials Name Effective Dates Discipline    DM Sara Cortés, PT 06/19/15 -  PT    Julia Novoa, RN 06/16/16 -  Nurse    KF Leeanna Troy, OT 04/03/18 -  OT          Wound 04/05/19 0030 Right medial gluteal (Active)   Dressing Appearance dry;intact 4/9/2019  8:00 PM   Closure KIM 4/9/2019  8:00 PM   Base dressing in place, unable to visualize 4/9/2019  8:00 PM           Physical Therapy Education     Title: PT OT SLP Therapies (In Progress)     Topic: Physical Therapy (In Progress)     Point: Mobility training (In Progress)     Learning Progress Summary           Patient Acceptance, E,D, NR by DM at 4/10/2019 12:33 PM    Acceptance, E, NR by PD at 4/10/2019  1:54 AM    Acceptance, E, NR by PD at 4/9/2019  1:09 AM    Acceptance, E, NR by KR at 4/8/2019  2:37 PM    Acceptance, E, NR by LIZETTE at 4/5/2019  2:45 PM   Family Acceptance, E,D, NR by DM at 4/10/2019 12:33 PM    Acceptance, E, NR by LIZETTE at 4/5/2019  2:45 PM                   Point: Home exercise program (In Progress)      Learning Progress Summary           Patient Acceptance, E,D, NR by DM at 4/10/2019 12:33 PM    Acceptance, E, NR by PD at 4/10/2019  1:54 AM    Acceptance, E, NR by KR at 4/8/2019  2:37 PM    Acceptance, E, NR by LIZETTE at 4/5/2019  2:45 PM   Family Acceptance, E,D, NR by DM at 4/10/2019 12:33 PM    Acceptance, E, NR by LIZETTE at 4/5/2019  2:45 PM                   Point: Body mechanics (In Progress)     Learning Progress Summary           Patient Acceptance, E,D, NR by DM at 4/10/2019 12:33 PM    Acceptance, E, NR by PD at 4/10/2019  1:54 AM    Acceptance, E, NR by PD at 4/9/2019  1:09 AM    Acceptance, E, NR by KR at 4/8/2019  2:37 PM    Acceptance, E, NR by LIZETTE at 4/5/2019  2:45 PM   Family Acceptance, E,D, NR by DM at 4/10/2019 12:33 PM    Acceptance, E, NR by LIZETTE at 4/5/2019  2:45 PM                   Point: Precautions (In Progress)     Learning Progress Summary           Patient Acceptance, E,D, NR by DM at 4/10/2019 12:33 PM    Acceptance, E, NR by PD at 4/10/2019  1:54 AM    Acceptance, E, NR by KR at 4/8/2019  2:37 PM    Acceptance, E, NR by LIZETTE at 4/5/2019  2:45 PM   Family Acceptance, E,D, NR by DM at 4/10/2019 12:33 PM    Acceptance, E, NR by LIZETTE at 4/5/2019  2:45 PM                               User Key     Initials Effective Dates Name Provider Type Discipline    LIZETTE 06/19/15 -  Maria C Pierson, PT Physical Therapist PT    DM 06/19/15 -  Sara Cortés, PT Physical Therapist PT    PD 05/30/17 -  [place], RN Registered Nurse Nurse    KR 04/03/18 -  Elvira Gil, PT Physical Therapist PT                PT Recommendation and Plan  Anticipated Discharge Disposition (PT): assisted living facility (group home)  Therapy Frequency (PT Clinical Impression): daily  Outcome Summary/Treatment Plan (PT)  Daily Summary of Progress (PT): progress towards functional goals is fair  Anticipated Discharge Disposition (PT): assisted living facility (group home)  Plan of Care Reviewed With: patient, grandchild(ni)  Progress:  improving  Outcome Summary: Able to dianna sit.bal activ & ther ex all extrem & neck w/ mult rests, but limited by pain R ribcage (RIB fxs & PNX) & RUE, fatigue, poor head control d/t cerv. kyphosis & prev. comp.fxs, low HGB (9.4), & poor inspiratory effort w/ respirex  Outcome Measures     Row Name 04/10/19 1149 04/10/19 0750 04/08/19 1437       How much help from another person do you currently need...    Turning from your back to your side while in flat bed without using bedrails?  2  -DM  --  2  -KR    Moving from lying on back to sitting on the side of a flat bed without bedrails?  2  -DM  --  2  -KR    Moving to and from a bed to a chair (including a wheelchair)?  1  -DM  --  1  -KR    Standing up from a chair using your arms (e.g., wheelchair, bedside chair)?  1  -DM  --  1  -KR    Climbing 3-5 steps with a railing?  1  -DM  --  1  -KR    To walk in hospital room?  1  -DM  --  1  -KR    AM-PAC 6 Clicks Score  8  -DM  --  8  -KR       How much help from another is currently needed...    Putting on and taking off regular lower body clothing?  --  1  -KF  --    Bathing (including washing, rinsing, and drying)  --  1  -KF  --    Toileting (which includes using toilet bed pan or urinal)  --  1  -KF  --    Putting on and taking off regular upper body clothing  --  2  -KF  --    Taking care of personal grooming (such as brushing teeth)  --  2  -KF  --    Eating meals  --  2  -KF  --    Score  --  9  -KF  --       Functional Assessment    Outcome Measure Options  AM-PAC 6 Clicks Basic Mobility (PT)  -DM  AM-PAC 6 Clicks Daily Activity (OT)  -KF  AM-PAC 6 Clicks Basic Mobility (PT)  -KR    Row Name 04/08/19 1422             How much help from another is currently needed...    Putting on and taking off regular lower body clothing?  1  -CL      Bathing (including washing, rinsing, and drying)  1  -CL      Toileting (which includes using toilet bed pan or urinal)  1  -CL      Putting on and taking off regular upper body  clothing  2  -CL      Taking care of personal grooming (such as brushing teeth)  2  -CL      Eating meals  2  -CL      Score  9  -CL         Functional Assessment    Outcome Measure Options  AM-PAC 6 Clicks Daily Activity (OT)  -CL        User Key  (r) = Recorded By, (t) = Taken By, (c) = Cosigned By    Initials Name Provider Type    Sara Dawson, PT Physical Therapist    CL Regina Barnhart, OT Occupational Therapist    Elvira Baldwin, PT Physical Therapist    Leeanna Aaron, OT Occupational Therapist         Time Calculation:   PT Charges     Row Name 04/10/19 1238 04/10/19 1149          Time Calculation    Start Time  1149  -DM  --     PT Received On  04/10/19  -DM  --     PT Goal Re-Cert Due Date  04/15/19  -DM  --        Time Calculation- PT    Total Timed Code Minutes- PT  24 minute(s)  -DM  --        Timed Charges    70064 - PT Therapeutic Exercise Minutes  --  24  -DM       User Key  (r) = Recorded By, (t) = Taken By, (c) = Cosigned By    Initials Name Provider Type    Sara Dawson, PT Physical Therapist        Therapy Charges for Today     Code Description Service Date Service Provider Modifiers Qty    [id number] HC PT THER PROC EA 15 MIN 4/10/2019 Sara Cortés, PT GP 2          PT G-Codes  Outcome Measure Options: AM-PAC 6 Clicks Basic Mobility (PT)  AM-PAC 6 Clicks Score: 8  Score: 9    Sara Cortés, PT  4/10/2019

## 2019-04-10 NOTE — PROGRESS NOTES
[facility] Medicine Services  PROGRESS NOTE    Patient Name: Kristin Magdaleno  : 1935  MRN: [id number]    Date of Admission: 2019  Length of Stay: 6  Primary Care Physician: Maryann Guillaume MD    Subjective   Subjective     CC:  Fall/hypoxia     HPI:  No issues overnight  Not sure if her breathing is getting better, remains on 3L    Review of Systems  Gen- No fevers, chills  CV- No chest pain, palpitations  Resp- No cough, dyspnea  GI- No N/V/D, abd pain    Otherwise ROS is negative except as mentioned in the HPI.    Objective   Objective     Vital Signs:   Temp:  [98 °F (36.7 °C)-98.8 °F (37.1 °C)] 98.3 °F (36.8 °C)  Heart Rate:  [74-97] 74  Resp:  [18-20] 18  BP: (121-133)/(58-69) 121/60        Physical Exam:  Constitutional: No acute distress, awake, alert, up in chair  HENT: NCAT, mucous membranes moist  Respiratory: Decreased throughout, respiratory effort normal   Cardiovascular: RRR, no murmurs, rubs, or gallops, palpable pedal pulses bilaterally  Gastrointestinal: Positive bowel sounds, soft, nontender, nondistended  Musculoskeletal: No bilateral ankle edema  Psychiatric: Appropriate affect, cooperative  Neurologic: Oriented x 3, SANTILLAN, speech clear  Skin: No rashes      Results Reviewed:  I have personally reviewed current lab, radiology, and data and agree.    Results from last 7 days   Lab Units 19  0551 19  0330 19  0543 19  04019  2132   WBC 10*3/mm3 8.81 12.46* 13.26* 17.83*  --    HEMOGLOBIN g/dL 9.4* 9.9* 10.4* 12.4  --    HEMATOCRIT % 30.2* 30.5* 33.0* 40.8  --    PLATELETS 10*3/mm3 283 238 254 295  --    INR   --   --  1.41* 2.04* 2.7*     Results from last 7 days   Lab Units 19  0551 19  0400 19  0543 198  19   SODIUM mmol/L 141 138 139 141   < >  --    POTASSIUM mmol/L 4.0 3.8 3.9 4.6   < >  --    CHLORIDE mmol/L 107 106 107 105   < >  --    CO2 mmol/L 28.0 27.0 24.0 23.0   < >  --     BUN mg/dL 35* 32* 28* 25*   < >  --    CREATININE mg/dL 0.92 0.93 0.90 1.15   < >  --    GLUCOSE mg/dL 114* 134* 179* 317*   < >  --    CALCIUM mg/dL 8.1* 8.1* 8.1* 8.5*   < >  --    ALT (SGPT) U/L  --  51* 62* 96*  --   --    AST (SGOT) U/L  --  127* 152* 95*  --   --    TROPONIN I ng/mL  --   --   --   --   --  0.00    < > = values in this interval not displayed.     Estimated Creatinine Clearance: 37.5 mL/min (by C-G formula based on SCr of 0.92 mg/dL).      Microbiology Results Abnormal     Procedure Component Value - Date/Time    Blood Culture - Blood, Arm, Left [[id number]] Collected:  04/04/19 2140    Lab Status:  Final result Specimen:  Blood from Arm, Left Updated:  04/09/19 2200     Blood Culture No growth at 5 days    Blood Culture - Blood, Arm, Right [[id number]] Collected:  04/04/19 2150    Lab Status:  Final result Specimen:  Blood from Arm, Right Updated:  04/09/19 2200     Blood Culture No growth at 5 days    MRSA Screen, PCR - Swab, Nares [[id number]]  (Normal) Collected:  04/05/19 1053    Lab Status:  Final result Specimen:  Swab from Nares Updated:  04/05/19 1229     MRSA, PCR Negative    Narrative:       MRSA Negative          Imaging Results (last 24 hours)     ** No results found for the last 24 hours. **               I have reviewed the medications:    Current Facility-Administered Medications:   •  acetaminophen (TYLENOL) tablet 650 mg, 650 mg, Oral, Q4H PRN, 650 mg at 04/05/19 1219 **OR** acetaminophen (TYLENOL) suppository 650 mg, 650 mg, Rectal, Q4H PRN, Uri Poole APRN  •  acetaminophen (TYLENOL) tablet 500 mg, 500 mg, Oral, TID, Latonia Verma APRN, 500 mg at 04/10/19 0934  •  aspirin chewable tablet 81 mg, 81 mg, Oral, Daily, Magdi Wood MD, 81 mg at 04/10/19 0937  •  atorvastatin (LIPITOR) tablet 40 mg, 40 mg, Oral, Nightly, Magdi Wood MD, 40 mg at 04/09/19 4038  •  aztreonam (AZACTAM) 2 g in sodium chloride 0.9 % 100 mL IVPB-MBP, 2 g,  Intravenous, Q8H, Uri Poole APRN, 2 g at 04/10/19 0513  •  castor oil-balsam peru (VENELEX) ointment, , Topical, Q12H, Milly Hdz MD  •  clindamycin (CLEOCIN) 600 mg in dextrose 5% 50 mL IVPB (premix), 600 mg, Intravenous, Q8H, Marlen Mendez, RP, 600 mg at 04/10/19 0824  •  dextrose (D50W) 25 g/ 50mL Intravenous Solution 25 g, 25 g, Intravenous, Q15 Min PRN, Uri Poole, JAY  •  dextrose (GLUTOSE) oral gel 15 g, 15 g, Oral, Q15 Min PRN, Uri Poole APRN  •  digoxin (LANOXIN) tablet 250 mcg, 250 mcg, Oral, Daily, Magdi Wood MD, 250 mcg at 04/10/19 0937  •  enoxaparin (LOVENOX) syringe 30 mg, 30 mg, Subcutaneous, Q24H, Magdi Wood MD, 30 mg at 04/09/19 1621  •  famotidine (PEPCID) tablet 20 mg, 20 mg, Oral, Daily, Magdi Wood MD, 20 mg at 04/10/19 0937  •  glucagon (human recombinant) (GLUCAGEN DIAGNOSTIC) injection 1 mg, 1 mg, Subcutaneous, PRN, Uri Poole, APRN  •  HYDROcodone-acetaminophen (NORCO) 5-325 MG per tablet 1 tablet, 1 tablet, Oral, Q6H PRN, Shruthi Sy DO, 1 tablet at 04/10/19 1048  •  HYDROcodone-acetaminophen (NORCO) 5-325 MG per tablet 1 tablet, 1 tablet, Oral, Q6H, Latonia Verma, APRN, 1 tablet at 04/10/19 0512  •  insulin lispro (humaLOG) injection 0-9 Units, 0-9 Units, Subcutaneous, 4x Daily With Meals & Nightly, Uri Poole APRN, 2 Units at 04/09/19 2233  •  ipratropium-albuterol (DUO-NEB) nebulizer solution 3 mL, 3 mL, Nebulization, Q4H PRN, Uri Poole, JAY  •  ipratropium-albuterol (DUO-NEB) nebulizer solution 3 mL, 3 mL, Nebulization, 4x Daily - RT, Uri Poole, JAY, 3 mL at 04/10/19 0659  •  lidocaine (LIDODERM) 5 % 1 patch, 1 patch, Transdermal, Q24H, Latonia Verma, APRN, 1 patch at 04/09/19 2233  •  magnesium sulfate 4 gram infusion - Mg less than or equal to 1mg/dL, 4 g, Intravenous, PRN, Last Rate: 25 mL/hr at 04/05/19 1314, 4 g at 04/05/19 1314 **OR**  magnesium sulfate 3 gram infusion (1gm x 3) - Mg 1.1 - 1.5 mg/dL, 1 g, Intravenous, PRN **OR** Magnesium Sulfate 2 gram infusion- Mg 1.6 - 1.9 mg/dL, 2 g, Intravenous, PRN, Magdi Wood MD, Last Rate: 25 mL/hr at 04/07/19 0819, 2 g at 04/07/19 0819  •  metoprolol succinate XL (TOPROL-XL) 24 hr tablet 100 mg, 100 mg, Oral, Daily, Brice Fofana IV, RPH, 100 mg at 04/10/19 0935  •  multivitamin with minerals 1 tablet, 1 tablet, Oral, Daily, Magdi Wood MD, 1 tablet at 04/10/19 0937  •  ondansetron (ZOFRAN) injection 4 mg, 4 mg, Intravenous, Q6H PRN, Uri Poole, APRN  •  polyethylene glycol 3350 powder (packet), 17 g, Oral, BID, Magdi Wood MD, 17 g at 04/10/19 0934  •  senna (SENOKOT) tablet 17.2 mg, 2 tablet, Oral, Nightly PRN, Magdi Wood MD  •  sodium chloride 0.9 % flush 10 mL, 10 mL, Intravenous, PRN, Kash Gee, DO  •  sodium chloride 0.9 % flush 3 mL, 3 mL, Intravenous, Q12H, Uri Poole APRN, 3 mL at 04/09/19 2232  •  sodium chloride 0.9 % flush 3-10 mL, 3-10 mL, Intravenous, PRN, Uri Poole APRN, 10 mL at 04/10/19 0937  •  traZODone (DESYREL) tablet 50 mg, 50 mg, Oral, Nightly PRN, Magdi Wood MD      Assessment/Plan   Assessment / Plan     Active Hospital Problems    Diagnosis POA   • **Acute on chronic hypoxemic/hypercarbic respiratory failure not requiring ventilatory support. Improved (CMS/HCC) [J96.21, J96.22] Yes   • Severe cervical kyphosis presumably due to osteoporosis and compression fractures [M40.202] Yes   • Failure to thrive.  Wheelchair-bound [R62.7] Yes   • Small right traumatic pneumothorax due to fall and multiple right rib fractures [J93.9] Yes   • History of rheumatic heart disease. Data deficit. No echoes [I09.9] Yes     as a child, heart murmur, no recent echos (pt. declines)     • Atrial fibrillation; on Coumadin, Digoxin, Metoprolol [I48.91] Yes   • Diabetes mellitus  without complication (CMS/HCC) [E11.9] Yes     Type 2 or unspecified, not stated as uncontrolled.     • Remote CVA with right hemiparesis.  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 female with history of CAD s/p CABG, remote CVA, afib on coumadin, DM, HTN, HLD, ocular degeneration with blindness, hypothyroid who presents after a fall. On EMS arrival patient was hypoxic. Found to have rib fracture with PNX in the ED and hypoxia improved on BiPAP and then high flow. Patient was admitted to the ICU. Right sided parenchymal infiltrates with associated leukocytosis and she was started initially on aztreonam, doxy and vanc and then transitioned to flagyl, aztreonam and clina. Now currently on clinda and aztreonam. Weaned off high flow o2 and transferred to the floor 4/9 and medicine resumed care     Acute on chronic hypoxic and hypercarbic resp failure   Right PNA   Pneumothorax   Multiple rib fractures   - Transferred out of the ICU and currently on nasal cannula   - Continue aztreonam and clinda 4/4-4/13  - Cont pain medication     Kyphosis   - PT/OT as tolerated     CAD s/p CABG   - Cont ASA, statin and metoprolol     Afib   - Was on coumadin; will likely need to stay off anti-coagulation due to risk/benefits   - Continue metoprolol and digoxin     DM   - controlled; cont SSI   Component      Latest Ref Rng & Units 4/9/2019 4/9/2019 4/10/2019 4/10/2019           5:08 PM  9:06 PM  8:05 AM 11:42 AM   Glucose      70 - 130 mg/dL 206 (H) 167 (H) 118 142 (H)     History of CVA   - Cont ASA/statin     HTN    - controlled; continue metoprolol      DVT Prophylaxis:  lovenox     Disposition: I expect the patient to be discharged to [facility] tomorrow    CODE STATUS:   Code Status and Medical Interventions:    Ordered at: 04/05/19 0936     Limited Support to NOT Include:    Intubation     Code Status:    No CPR     Medical Interventions (Level of Support Prior to Arrest):    Limited         Electronically signed by JAY Fox, 04/10/19, 12:23 PM.

## 2019-04-10 NOTE — PLAN OF CARE
Problem: Patient Care Overview  Goal: Plan of Care Review  Outcome: Ongoing (interventions implemented as appropriate)   04/10/19 1195   OTHER   Outcome Summary Pt dependent transfer x2 assist via mechanical lift to chair to provide postural support for sitting to improve participation in self-feeding, max A self-feeding and dependent set up of meal; cont IPOT per POC   Coping/Psychosocial   Plan of Care Reviewed With patient;grandchild(ni)   Coping/Psychosocial   Patient Agreement with Plan of Care agrees   Plan of Care Review   Progress improving

## 2019-04-10 NOTE — PROGRESS NOTES
Case Management Discharge Note    Final Note: Per Irais w/ [facility], they are able to accept patient to the Skilled Rehab unit tomorrow if medically ready. D/w APRN. Transportation has been arranged through [facility] for 1400. Nurse to call report to [phone], IA summary will be pulled from [employer].     Destination - Selection Complete      Service Provider Request Status Selected Services Address Phone Number Fax Number    [facility] Selected Inpatient Rehabilitation [address] [id number] [phone]      Transportation Services  Ambulance: [facility]    Final Discharge Disposition Code: 03 - skilled nursing facility (SNF)

## 2019-04-10 NOTE — PLAN OF CARE
Problem: Patient Care Overview  Goal: Interprofessional Rounds/Family Conf  Outcome: Ongoing (interventions implemented as appropriate)  1300 Palliative Team Conference: NEWTON Allison RN, [facility]; JO ANN Verma, APRN; MARYANN Bridges DO; ALEX Cruz, RN; KELTON Romero MDiv; ZARINA Wren, [facility], [facility]   04/10/19 1450   Interdisciplinary Rounds/Family Conf   Summary Pt c/o difficult supporting her head comfortably; straight pillow under her chin slips sideways and is ineffective support most of the time. Provided curved pillow for head support d/t cx kyphosis. Pt stated rib pain managed more effectively since medication scheduled; still requiring some prn doses. Plan is for transfer to skilled bed at [facility] tomorrow.       Problem: Palliative Care (Adult)  Intervention: Minimize Discomfort   04/10/19 1450   Promote Oxygenation/Perfusion   Pain Management Interventions pain management plan reviewed with patient/caregiver     Intervention: Optimize Function   04/10/19 1450   Musculoskeletal Interventions   Fatigue Management paced activity encouraged     Intervention: Support/Optimize Psychosocial Response   04/10/19 1450   Coping/Psychosocial Interventions   Supportive Measures positive reinforcement provided;relaxation techniques promoted;verbalization of feelings encouraged       Goal: Maximized Comfort  Outcome: Ongoing (interventions implemented as appropriate)   04/10/19 1450   Palliative Care (Adult)   Maximized Comfort making progress toward outcome  (pain medication scheduled and prn)     Goal: Enhanced Quality of Life  Outcome: Ongoing (interventions implemented as appropriate)   04/10/19 1450   Palliative Care (Adult)   Enhanced Quality of Life making progress toward outcome  (curved pillow for head support)

## 2019-04-11 PROBLEM — E43 SEVERE MALNUTRITION (HCC): Status: ACTIVE | Noted: 2019-01-01

## 2019-04-11 NOTE — PLAN OF CARE
Problem: Patient Care Overview  Goal: Plan of Care Review  Outcome: Ongoing (interventions implemented as appropriate)   04/11/19 2241   OTHER   Outcome Summary Pt rested well throughout shift, continues to c/o generalized pain R>L that is controlled with scheduled PO pain meds. Weaned to 1L NC, unable to tolerate RA at this time. Vitals otherwise stable. Plan for d/c to [facility] today @ 1400.    Coping/Psychosocial   Plan of Care Reviewed With patient

## 2019-04-11 NOTE — DISCHARGE SUMMARY
[facility] Medicine Services  DISCHARGE SUMMARY    Patient Name: Kristin Magdaleno  : 1935  MRN: [id number]    Date of Admission: 2019  Date of Discharge:  19  Primary Care Physician: Maryann Guillaume MD    Consults     Date and Time Order Name Status Description    2019 0030 Inpatient Palliative Care MD Consult Completed           Hospital Course     Presenting Problem:   Acute on chronic respiratory failure with hypoxia and hypercapnia (CMS/HCC) [J96.21, J96.22]    Active Hospital Problems    Diagnosis  POA   • **Acute on chronic hypoxemic/hypercarbic respiratory failure not requiring ventilatory support. Improved (CMS/HCC) [J96.21, J96.22]  Yes     Priority: Medium   • Small right traumatic pneumothorax due to fall and multiple right rib fractures [J93.9]  Yes     Priority: Medium   • Severe cervical kyphosis presumably due to osteoporosis and compression fractures [M40.202]  Yes   • Failure to thrive.  Wheelchair-bound [R62.7]  Yes   • History of rheumatic heart disease. Data deficit. No echoes [I09.9]  Yes   • Atrial fibrillation; on Coumadin, Digoxin, Metoprolol [I48.91]  Yes   • Diabetes mellitus without complication (CMS/HCC) [E11.9]  Yes   • Remote CVA with right hemiparesis.  20+ years (CMS/HCC) [I63.9]  Yes   • Essential hypertension, benign [I10]  Yes   • Pure hypercholesterolemia [E78.00]  Yes   • CAD status post CABG [I25.10]  Yes      Resolved Hospital Problems   No resolved problems to display.          Hospital Course:  Kristin Magdaleno is a 84 y.o. female with a history of coronary artery disease status post CABG, prior stroke with chronic right hemiparesis, A. fib on Coumadin, type 2 diabetes who is a resident at [facility] assisted living.  She suffered a fall while getting off the toilet and presented to the emergency room with dyspnea, pleuritic chest pain, and significant hypoxia.  Workup there revealed evidence of a small right apical  pneumothorax, multiple rib fractures on the right side, and lung infiltrate.  She was placed on BiPAP and admitted into the ICU.  That was eventually weaned to high flow nasal cannula.  No chest tube was required for the pneumothorax.  Subsequent chest x-ray showed worsening infiltrates on the right side and she was started on empiric antibiotics.  She did have some leukocytosis and while here to complete empiric antibiotics for 7 days for presumed pneumonia.  Palliative care evaluated the patient and placed her on Lortab for her significant pleuritic chest pain related to the rib fractures.  Discussion with the family was had and she has a DO NOT RESUSCITATE.  Overall goals at this time are comfort focused.  With that in mind plan is to stop Coumadin as risk currently outweigh benefits.  No indication for oral hypoglycemics or insulin at this time and will allow her blood sugars be slightly elevated.  All this was discussed with the daughter at the day of discharge and she concurs.  She will also be allowed to have a palliative diet.  At the time of discharge arrangements have been made for some rehab at [facility] for strengthening prior to going home.  Patient does appear to be very chronically ill and will be at high risk for readmission.  At that time further goals of care including possibly hospice should be discussed.        Day of Discharge     HPI:   She voices no complaints.  Granddtr at bedside.  No events overnight, she is a poor historian.    Review of Systems  Gen- No fevers, chills  CV- + right chest pain, no palpitations  Resp- No cough, dyspnea  GI- No N/V/D, abd pain    Otherwise ROS is negative except as mentioned in the HPI.    Vital Signs:   Temp:  [97.4 °F (36.3 °C)-99.1 °F (37.3 °C)] 98.2 °F (36.8 °C)  Heart Rate:  [] 79  Resp:  [18-20] 18  BP: (120-146)/(62-77) 139/74     Physical Exam:  Constitutional: No acute distress, awake, alert, chronically ill appearing and cachectic  HENT:  NCAT, mucous membranes moist  Respiratory: diminished bilaterally, no distress  Cardiovascular: RRR, no murmurs, rubs, or gallops  Gastrointestinal: Positive bowel sounds, soft, nontender, nondistended  Musculoskeletal: No bilateral ankle edema, cervical kyphosis  Psychiatric: Appropriate affect, cooperative  Neurologic: Oriented x 2, weak, follows commands  Skin: No rashes      Pertinent  and/or Most Recent Results     Results from last 7 days   Lab Units 04/08/19  0551 04/07/19  0400 04/07/19  0330 04/06/19 0543 04/05/19 0409 04/04/19 2128   WBC 10*3/mm3 8.81  --  12.46* 13.26* 17.83* 14.79*   HEMOGLOBIN g/dL 9.4*  --  9.9* 10.4* 12.4 12.6   HEMATOCRIT % 30.2*  --  30.5* 33.0* 40.8 41.4   PLATELETS 10*3/mm3 283  --  238 254 295 351   SODIUM mmol/L 141 138  --  139  --  141   POTASSIUM mmol/L 4.0 3.8  --  3.9  --  4.6   CHLORIDE mmol/L 107 106  --  107  --  105   CO2 mmol/L 28.0 27.0  --  24.0  --  23.0   BUN mg/dL 35* 32*  --  28*  --  25*   CREATININE mg/dL 0.92 0.93  --  0.90  --  1.15   GLUCOSE mg/dL 114* 134*  --  179*  --  317*   CALCIUM mg/dL 8.1* 8.1*  --  8.1*  --  8.5*     Results from last 7 days   Lab Units 04/07/19  0400 04/06/19  0543 04/05/19  0409 04/04/19 2132 04/04/19 2128   BILIRUBIN mg/dL 0.6 0.5  --   --  0.3   ALK PHOS U/L 54 58  --   --  75   ALT (SGPT) U/L 51* 62*  --   --  96*   AST (SGOT) U/L 127* 152*  --   --  95*   PROTIME Seconds  --  16.6* 22.2* 30.7*  --    INR   --  1.41* 2.04* 2.7*  --            Invalid input(s): TG, LDLCALC, LDLREALC  Results from last 7 days   Lab Units 04/07/19  0330 04/06/19  0543 04/05/19  0409 04/04/19 2128 04/04/19 2123   TSH mIU/mL  --   --   --  2.666  --    HEMOGLOBIN A1C %  --   --  6.40*  --   --    BNP pg/mL 1,957.0* 1,875.0*  --  297.0*  --    TROPONIN I ng/mL  --   --   --   --  0.00       Brief Urine Lab Results     None          Microbiology Results Abnormal     Procedure Component Value - Date/Time    Blood Culture - Blood, Arm, Left  [[id number]] Collected:  04/04/19 2140    Lab Status:  Final result Specimen:  Blood from Arm, Left Updated:  04/09/19 2200     Blood Culture No growth at 5 days    Blood Culture - Blood, Arm, Right [[id number]] Collected:  04/04/19 2150    Lab Status:  Final result Specimen:  Blood from Arm, Right Updated:  04/09/19 2200     Blood Culture No growth at 5 days    MRSA Screen, PCR - Swab, Nares [[id number]]  (Normal) Collected:  04/05/19 1053    Lab Status:  Final result Specimen:  Swab from Nares Updated:  04/05/19 1229     MRSA, PCR Negative    Narrative:       MRSA Negative          Imaging Results (all)     Procedure Component Value Units Date/Time    XR Chest 1 View [[id number]] Collected:  04/07/19 1249     Updated:  04/07/19 3524    Narrative:          EXAMINATION: XR CHEST 1 VW - 04/07/2019     INDICATION:  J96.21-Acute and chronic respiratory failure with hypoxia;  J96.22-Acute and chronic respiratory failure with hypercapnia;  J93.9-Pneumothorax, unspecified; R91.8-Other nonspecific abnormal  finding of lung field; S09.90XA-Unspecified injury of head, initial  encounter; S00.01XA-Abrasion of scalp, initial encounter; Z79.01-Long  term (current) use of anticoagulants.     COMPARISON: 04/06/2019     FINDINGS: There is still fairly extensive interstitial disease  bilaterally, but uniformly improved from yesterday's study. There is  minimal if any effusion and no evidence of pneumothorax. Heart shadow is  a little smaller today, considered within normal limits.           Impression:       Improving pulmonary interstitial edema.     DICTATED:   04/07/2019  EDITED/ls :   04/07/2019      This report was finalized on 4/7/2019 11:51 PM by DR. Maximilian Green MD.       XR Chest 1 View [[id number]] Collected:  04/06/19 1159     Updated:  04/06/19 2300    Narrative:          EXAMINATION: XR CHEST 1 VW - 04/06/2019     INDICATION: J96.21-Acute and chronic respiratory failure with hypoxia;  J96.22-Acute and chronic respiratory  failure with hypercapnia;  J93.9-Pneumothorax, unspecified; R91.8-Other nonspecific abnormal  finding of lung field; S09.90XA-Unspecified injury of head, initial  encounter; S00.01XA-Abrasion of scalp, initial encounter; Z79.01-Long  term (current) use of anticoagulants.     COMPARISON: 04/05/2019     FINDINGS: There is improved aeration of the right lung although  significant interstitial disease remains. Only mild interstitial changes  are seen on the left. No pneumothorax or significant effusion is  identified. The heart remains enlarged.           Impression:       Improving aeration of the right lung. No new chest disease  is seen.     DICTATED:   04/06/2019  EDITED/ls :   04/06/2019      This report was finalized on 4/6/2019 10:57 PM by DR. Maximilian Green MD.       XR Chest 1 View [[id number]] Collected:  04/05/19 0948     Updated:  04/05/19 1150    Narrative:       EXAMINATION: XR CHEST 1 VW-04/05/2019:      INDICATION: Evaluate R PTX; J96.21-Acute and chronic respiratory failure  with hypoxia; J96.22-Acute and chronic respiratory failure with  hypercapnia; J93.9-Pneumothorax, unspecified; R91.8-Other nonspecific  abnormal finding of lung field; S09.90XA-Unspecified injury of head,  initial encounter; S00.01XA-Abrasion of scalp, initial encounter;  Z79.01-Long term (current) use of anticoagulants; Z86.79-Personal  history.     TECHNIQUE:  Single view frontal chest.     COMPARISONS: 04/04/2019.     FINDINGS:  Minimal residual if any pneumothorax is seen at the right  lung apex. Extensive airspace disease in the right lung has increased.  Some of the acute right rib fractures are visible. Patchy opacities are  seen in the left lung as well. Cardiomegaly and sternotomy wires.       Impression:       Minimal residual if any pneumothorax is seen at the right  lung apex. Increasing right lung airspace disease.     D:  04/05/2019  E:  04/05/2019     This report was finalized on 4/5/2019 11:47 AM by Mann Neves.       XR  Hand 3+ View Left [[id number]] Collected:  04/05/19 1027     Updated:  04/05/19 1149    Narrative:       EXAMINATION: XR HAND 3+ VW LEFT- 04/05/2019      INDICATION: r/o 5th digit fracture; J96.21-Acute and chronic respiratory  failure with hypoxia; J96.22-Acute and chronic respiratory failure with  hypercapnia; J93.9-Pneumothorax, unspecified; R91.8-Other nonspecific  abnormal finding of lung field; S09.90XA-Unspecified injury of head,  initial encounter; S00.01XA-Abrasion of scalp, initial encounter;  Z79.01-Long term (current) use of anticoagulants; Z86.79-Pers     TECHNIQUE:  3 views left hand     COMPARISONS:  None.     FINDINGS:  Background osteopenia. There is a nondisplaced fracture at  the base of the fifth ray proximal phalanx with extension into the  metacarpophalangeal joint. There is associated soft tissue swelling.  There is no radiodense foreign body. There is additional advanced  degenerative change at the first and second carpometacarpal joints.       Impression:       Intra-articular fracture involving the base of the fifth  ray proximal phalanx. This is superimposed on osteopenia and  degenerative change.     D:  04/05/2019  E:  04/05/2019        This report was finalized on 4/5/2019 11:46 AM by Mann Neves.       CT Chest Without Contrast [[id number]] Collected:  04/04/19 2348     Updated:  04/04/19 2350    Narrative:       CT Chest WO    INDICATION:   Mixed acute and chronic respiratory failure with hypoxia and hypercapnia. Right-sided pneumothorax identified on chest x-ray earlier in the evening.    TECHNIQUE:   CT of the thorax without contrast. Coronal and sagittal reconstructions were obtained.  Radiation dose reduction techniques included automated exposure control or exposure modulation based on body size.     COUNT OF KNOWN CT AND CARDIAC NUC MED STUDIES PERFORMED IN PREVIOUS 12 MONTHS: 0.      COMPARISON:   2/4/2018    FINDINGS:  Chest images at mediastinal window show cardiomegaly.  Atherosclerotic changes are seen in the aorta. The ascending aorta shows fusiform dilatation to a maximum diameter of 3.7 cm.    Chest images at lung window show a sliver of pneumothorax on the right. This is similar to the chest x-ray from earlier in the evening. There is extensive predominantly groundglass infiltrate in the right upper lobe. The more dependent portion of the  right upper lobe demonstrates reticulonodular infiltrate and mild alveolar consolidation. There is a small right pleural effusion. There is also dense reticulonodular infiltrate in the right lower lobe in its midportion and there is dense consolidation  at the right lung base. The left lung demonstrates patchy groundglass infiltrate in both the upper and lower lobes with mild volume loss in the left lower lobe. These infiltrates are all new since the previous scan in February. There is a small bleb at  the right lung base posterior medially that was not seen on the previous CT. There are fractures of the right ribs posteriorly. The ninth, 10th and 11th ribs are fractured. This is likely the source of the pneumothorax. Healed rib fractures are seen on  the left.      Impression:       Multiple posterior rib fractures on the right. This is probably the source of the pneumothorax. The bilateral lung infiltrates are nonspecific. Pulmonary hemorrhage could certainly be a consideration in a patient with traumatic pneumothorax. Aspiration  pneumonitis could also account for these changes. Infectious pneumonia should be considered as well.    Signer Name: Chai Medrano MD   Signed: 4/4/2019 11:48 PM   Workstation Name: [id number]       CT Cervical Spine Without Contrast [[id number]] Collected:  04/04/19 2339     Updated:  04/04/19 2341    Narrative:       CT Spine Cervical WO    INDICATION:   Patient fell prior to arrival.    TECHNIQUE:   CT of the cervical spine without contrast. Coronal and sagittal reconstructions were obtained.  Radiation dose  reduction techniques included automated exposure control or exposure modulation based on body size.     COUNT OF KNOWN CT AND CARDIAC NUC MED STUDIES PERFORMED IN PREVIOUS 12 MONTHS: 0.      COMPARISON:  None available.    FINDINGS:  Moderate degenerative changes are seen at all cervical disks. Disc space narrowing is most prominent at C6-7. There is no significant posterior osteophyte formation. Posterior facet arthropathy is noted to a greater extent on the right than on the left  especially from C2 to C5. No cervical fractures are seen. There are no interlocked facets. No destructive bone lesions are seen.      Impression:       Cervical degenerative disc and facet disease. No acute findings. No evidence of fracture.    Signer Name: Cahi Medrano MD   Signed: 4/4/2019 11:39 PM   Workstation Name: [email or web address]       CT Head Without Contrast [[id number]] Collected:  04/04/19 2337     Updated:  04/04/19 2340    Narrative:       CT Head WO    HISTORY:   Current anticoagulation usage. Head injury with scalp abrasion. Patient fell prior to arrival.    TECHNIQUE:   Axial unenhanced head CT. Radiation dose reduction techniques included automated exposure control or exposure modulation based on body size.     COUNT OF KNOWN CT AND CARDIAC NUC MED STUDIES PERFORMED IN PREVIOUS 12 MONTHS: 0.      COMPARISON:   None.    FINDINGS:   Generalized atrophy is seen. There is a chronic infarct in the left parietal lobe. There is no evidence of mass lesion, hemorrhage or edema. No acute findings. Extra-axial structures are unremarkable.      Impression:       Atrophy with a large chronic infarct in the left parietal lobe. No acute findings..          Signer Name: Chai Medrano MD   Signed: 4/4/2019 11:37 PM   Workstation Name: [id number]       XR Chest 1 View [[id number]] Collected:  04/04/19 2157     Updated:  04/04/19 2159    Narrative:       CR Chest 1 Vw    INDICATION:   Short of air     COMPARISON:    To 518    FINDINGS:  Single  portable AP view of the chest.  Emphysema and cardiomegaly are redemonstrated. There is extensive interstitial infiltrate throughout the right lung, and there is a small right apical pneumothorax.      Impression:       Extensive right lung interstitial infiltrate superimposed on emphysema, small right pneumothorax.    NOTIFICATION: Critical Value/emergent results were called by telephone at the time of interpretation on 4/4/2019 9:53 PM  to  Kash Gee MD who verbally acknowledged these results.    Signer Name: Kane Van MD   Signed: 4/4/2019 9:57 PM   Workstation Name: [id number]                              Discharge Details        Discharge Medications      New Medications      Instructions Start Date   acetaminophen 500 MG tablet  Commonly known as:  TYLENOL   500 mg, Oral, 3 Times Daily      acetaminophen 325 MG tablet  Commonly known as:  TYLENOL   650 mg, Oral, Every 4 Hours PRN      HYDROcodone-acetaminophen 5-325 MG per tablet  Commonly known as:  NORCO   1 tablet, Oral, Every 6 Hours PRN         Continue These Medications      Instructions Start Date   atorvastatin 40 MG tablet  Commonly known as:  LIPITOR   TAKE ONE TABLET BY MOUTH EVERY DAY      digoxin 250 MCG tablet  Commonly known as:  LANOXIN   TAKE ONE TABLET BY MOUTH EVERY DAY      metoprolol succinate  MG 24 hr tablet  Commonly known as:  TOPROL-XL   TAKE ONE TABLET BY MOUTH EVERY DAY      polyethylene glycol pack packet  Commonly known as:  MIRALAX   17 g, Oral, 2 Times Daily      PRESERVISION AREDS 2 PO   2 capsules, Oral, Daily      senna 8.6 MG tablet tablet  Commonly known as:  SENOKOT   TAKE ONE TABLET BY MOUTH EVERY DAY AS NEEDED FOR CONSTIPATION      traZODone 50 MG tablet  Commonly known as:  DESYREL   TAKE ONE TABLET BY MOUTH DAILY AT BEDTIME AS NEEDED FOR INSOMNIA         Stop These Medications    losartan-hydrochlorothiazide 100-25 MG per tablet  Commonly known as:  HYZAAR     warfarin 4 MG tablet  Commonly known as:   COUMADIN     warfarin 5 MG tablet  Commonly known as:  COUMADIN            Allergies   Allergen Reactions   • Cephalosporins      adulthood   • Morphine And Related      unknown   • Penicillins      Penicillin G Potassium         Discharge Disposition:  Rehab Facility or Unit (DC - External)    Discharge Diet:  Diet Order   Procedures   • Diet Regular; Consistent Carbohydrate         Discharge Activity:   Activity Instructions     Activity as Tolerated              CODE STATUS:    Code Status and Medical Interventions:   Ordered at: 04/05/19 0936     Limited Support to NOT Include:    Intubation     Code Status:    No CPR     Medical Interventions (Level of Support Prior to Arrest):    Limited         No future appointments.    Additional Instructions for the Follow-ups that You Need to Schedule     Discharge Follow-up with PCP   As directed       Currently Documented PCP:    Maryann Guillaume MD    PCP Phone Number:    [phone]     Follow Up Details:  PCP 1-2 weeks               Time Spent on Discharge:  35 minutes    Electronically signed by Obey Lares MD, 04/11/19, 11:17 AM.

## 2019-04-13 PROBLEM — R29.6 MULTIPLE FALLS: Status: ACTIVE | Noted: 2019-01-01

## 2019-04-13 PROBLEM — R06.03 ACUTE RESPIRATORY DISTRESS: Status: ACTIVE | Noted: 2019-01-01

## 2019-04-13 PROBLEM — J96.90 RESPIRATORY FAILURE (HCC): Status: ACTIVE | Noted: 2019-01-01

## 2019-04-13 PROBLEM — I50.9 HEART FAILURE OF UNKNOWN TYPE (HCC): Status: ACTIVE | Noted: 2019-01-01

## 2019-04-14 PROBLEM — J96.20 ACUTE ON CHRONIC RESPIRATORY FAILURE (HCC): Status: ACTIVE | Noted: 2019-01-01

## 2019-04-14 NOTE — PROGRESS NOTES
Continued Stay Note  [facility]     Patient Name: Kristin Magdaleno  MRN: [id number]  Today's Date: 4/14/2019    Admit Date: 4/14/2019    Discharge Plan     Row Name 04/14/19 1440       Plan    Plan  Inpatient [facility]    Plan Comments  Chart Reviewed.  Patient is resting in bed at the time of visit, both daughters are at the bedside.  Life review was completed with both daughters.  Extensive information was given about inpatient hospice services, and daughters elected inpatient hospice services.  Inpatient hospice paperwork was signed, and discharge treatment orders was obtained by Dr. Ariana Diop.  This was discussed with the hospice APRN on-call.        Discharge Codes    No documentation.             Elodia Agudelo RN

## 2019-04-14 NOTE — SIGNIFICANT NOTE
Exam confirms with auscultation zero audible heart tones and zero audible respirations. Ms.Margaret Magdaleno was pronounced dead 4/14/2019 at 1307.  Family present at bedside.  MD notified by Patient's RN.    Suresh Leal RN  Clinical House Supervisor  4/14/2019 1:58 PM

## 2019-04-14 NOTE — H&P
Hospice History and Physical     Patient Name:  Kristin Magdaleno   : 1935   Sex: female    Patient Care Team:  Maryann Guillaume MD as PCP - General (Internal Medicine)  Maryann Guillaume MD as PCP - Claims Attributed  Courtney Taylor, RN as Care Coordinator (Population Health)    Code Status: comfort measures    Subjective     Patient is a 84 y.o. female admitted from  overnight for acute respiratory failure. Patient seen in ED earlier in evening after falling out of bed - received stiches back of her head and sent back to .      PMHx: macular degeneration/legally blind; CAD s/p CABG, HTN, T2DM, Afib on coumadin (d/c previous admission 2019), CVA with chronic right hemiparesis, and rheumatic heart disease     ED admission workup revealed: BNP significantly elevated at 45,675. Troponins elevated at 4.53 and did trend down to 3.57. Potassium elevated (5.6). WBC elevated at 16. CTA obtained and was negative for PE; did show large bilateral pleural effusions with bibasilar compressive atelectasis. She was also noted to have acute to subacute right sided rib fractures with chronic left sided rib fractures.     Pt previously admitted to [facility] - for Acute on chronic respiratory failure with hypoxia and hypercapnia. At that time she was a resident of [facility]. She suffered a fall while getting off the toilet and presented to the emergency room with dyspnea, pleuritic chest pain, and significant hypoxia.  Workup there revealed evidence of a small right apical pneumothorax, multiple rib fractures on the right side, and lung infiltrate.  She was placed on BiPAP and admitted into the ICU.  That was eventually weaned to high flow nasal cannula.  No chest tube was required for the pneumothorax.  Subsequent chest x-ray showed worsening infiltrates on the right side and she was started on empiric antibiotics.      Given pt's overall deterioration clinically as well as in her quality of  life, family ultimately elected for comfort measures. Ms. Magdaleno was admitted to in Hospice on 4/14/19 for acute on chronic respiratory failure.       Review of Systems  Review of Systems   Unable to perform ROS: Acuity of condition       History  Past Medical History:   Diagnosis Date   • Atrial fibrillation (CMS/HCC)    • CAD (coronary artery disease)     s/p cabg   • Chronic anticoagulation    • Diabetes mellitus (CMS/HCC)     type II    • H/O hemiparesis     Rt sided    • Heart murmur     RHF as child.  no recent echos (pt declines)   • Hyperlipidemia    • Hypertension    • Hypothyroid    • Rheumatic heart disease     as a child, heart murmur, no recent echos (pt. declines)   • Stroke (CMS/HCC)     20+ years ago in OR during CABG      Past Surgical History:   Procedure Laterality Date   • APPENDECTOMY     • BREAST SURGERY      no breast tissue, never had tara, mel. breast implants   • CORONARY ARTERY BYPASS GRAFT  1994    CVA  and a fib post-op - on coumadin since   • TOTAL HIP ARTHROPLASTY REVISION Right 1998     Current Facility-Administered Medications   Medication Dose Route Frequency Provider Last Rate Last Dose   • acetaminophen (TYLENOL) suppository 650 mg  650 mg Rectal Q4H PRN Niru Reed, APRN       • furosemide (LASIX) injection 20 mg  20 mg Intravenous Q8H PRN Niru Reed, APRN       • glycopyrrolate (ROBINUL) injection 0.4 mg  0.4 mg Intravenous Q6H Niru Reed, APRN   0.4 mg at 04/14/19 1230   • haloperidol lactate (HALDOL) injection 1 mg  1 mg Intravenous Q4H PRN Niru Reed, APRN       • HYDROmorphone (DILAUDID) injection 0.25 mg  0.25 mg Intravenous Q1H PRN Niru Reed, APRN   0.25 mg at 04/14/19 1230   • HYDROmorphone (DILAUDID) PCA 1 mg/mL syringe   Intravenous Continuous Niru Reed, APRN       • ketorolac (TORADOL) injection 15 mg  15 mg Intravenous Q8H PRN Niru Reed, APRN       • ondansetron (ZOFRAN) injection 4 mg  4 mg Intravenous Q6H  PRN Niru Reed APRN       • Scopolamine (TRANSDERM-SCOP) 1.5 MG/3DAYS patch 1 patch  1 patch Transdermal Q72H Niru Reed APRN   1 patch at 19 1239   • sodium chloride 0.9 % flush 10 mL  10 mL Intravenous PRN Niru Reed APRN           HYDROmorphone HCl-NaCl      •  acetaminophen  •  furosemide  •  haloperidol lactate  •  HYDROmorphone  •  ketorolac  •  ondansetron  •  sodium chloride  Allergies   Allergen Reactions   • Cephalosporins      adulthood   • Morphine And Related      unknown   • Penicillins      Penicillin G Potassium     Family History   Problem Relation Age of Onset   • Stomach cancer Mother    • Lung cancer Father    • Diabetes Brother    • Heart disease Brother    • No Known Problems Daughter    • No Known Problems Son    • No Known Problems Daughter      Social History     Socioeconomic History   • Marital status:      Spouse name: Not on file   • Number of children: Not on file   • Years of education: Not on file   • Highest education level: Not on file   Tobacco Use   • Smoking status: Former Smoker     Packs/day: 1.00     Years: 31.00     Pack years: 31.00     Types: Cigarettes     Start date: 1962     Last attempt to quit: 1993     Years since quittin.2   • Smokeless tobacco: Never Used   • Tobacco comment: quit after stroke.   Substance and Sexual Activity   • Alcohol use: No   • Drug use: No   • Sexual activity: Defer   Social History Narrative    Hx of CVA in  during CABG surgery with Rt hemiparesis and afib on coumadin since.  Elderly, frail and up in W/c.  Lives with         Objective     PPS: Palliative Performance Scale score as of 4/15/2019, 3:54 PM is 10% based on the following measures:   Ambulation: Totally bed bound  Activity and Evidence of Disease: Unable to do any work, extensive evidence of disease  Self-Care: Total care  Intake:  Mouth care only  LOC: Drowsy or coma      Physical Exam:  Constitutional: chronically  ill, frail, cachectic, elderly woman. Kypotic. Asleep.  Eyes: closed.  HENT: + temporal wasting; + alopecia  Neck: resting on neck pillow  Respiratory: even, shallow, nonlabored.  Cardiovascular: RRR; no edema  Gastrointestinal: nondistended. Flat abd.  Musculoskeletal: severe kyphosis  Psychiatric: no facial grimacing  Neurologic: not following commands   Skin: pale, normothermic      Results Review:   Lab Results   Component Value Date    HGBA1C 6.40 (H) 04/05/2019       Lab Results   Component Value Date    GLUCOSE 150 (H) 04/13/2019    BUN 51 (H) 04/13/2019    CREATININE 1.13 (H) 04/13/2019    EGFRIFNONA 46 (L) 04/13/2019    BCR 45.1 (H) 04/13/2019    K 5.6 (H) 04/13/2019    CO2 26.0 04/13/2019    CALCIUM 8.9 04/13/2019    ALBUMIN 3.40 (L) 04/13/2019    AST 61 (H) 04/13/2019    ALT 50 (H) 04/13/2019       WBC   Date Value Ref Range Status   04/13/2019 16.78 (H) 3.40 - 10.80 10*3/mm3 Final     Comment:     Modified report. Previous result was 16.93 10*3/mm3 on 4/13/2019 at 0401 EDT.     RBC   Date Value Ref Range Status   04/13/2019 3.68 (L) 3.77 - 5.28 10*6/mm3 Final     Comment:     Modified report. Previous result was 3.65 10*6/mm3 on 4/13/2019 at 0401 EDT.     Hemoglobin   Date Value Ref Range Status   04/13/2019 10.9 (L) 12.0 - 15.9 g/dL Final     Comment:     Modified report. Previous result was 11.2 g/dL on 4/13/2019 at 040 EDT.     Hematocrit   Date Value Ref Range Status   04/13/2019 36.6 34.0 - 46.6 % Final     Comment:     Modified report. Previous result was 35.5 % on 4/13/2019 at 040 EDT.     MCV   Date Value Ref Range Status   04/13/2019 99.5 (H) 79.0 - 97.0 fL Final     Comment:     Modified report. Previous result was 97.3 fL on 4/13/2019 at [facility] EDT.     MCH   Date Value Ref Range Status   04/13/2019 29.6 26.6 - 33.0 pg Final     Comment:     Modified report. Previous result was 30.7 pg on 4/13/2019 at 0401 EDT.     [facility]   Date Value Ref Range Status   04/13/2019 29.8 (L) 31.5 - 35.7 g/dL Final      Comment:     Modified report. Previous result was 31.5 g/dL on 4/13/2019 at 0401 EDT.     RDW   Date Value Ref Range Status   04/13/2019 13.6 12.3 - 15.4 % Final     Comment:     Modified report. Previous result was 13.7 % on 4/13/2019 at 0401 EDT.     RDW-SD   Date Value Ref Range Status   04/13/2019 49.4 37.0 - 54.0 fl Final     Comment:     Modified report. Previous result was 49.1 fl on 4/13/2019 at 0401 EDT.     MPV   Date Value Ref Range Status   04/13/2019 9.4 6.0 - 12.0 fL Final     Comment:     Modified report. Previous result was 9.0 fL on 4/13/2019 at 0401 EDT.     Platelets   Date Value Ref Range Status   04/13/2019 602 (H) 140 - 450 10*3/mm3 Final     Comment:     Modified report. Previous result was 569 10*3/mm3 on 4/13/2019 at 0401 EDT.     Neutrophil %   Date Value Ref Range Status   04/13/2019 87.5 (H) 42.7 - 76.0 % Final     Lymphocyte %   Date Value Ref Range Status   04/13/2019 6.7 (L) 19.6 - 45.3 % Final     Monocyte %   Date Value Ref Range Status   04/13/2019 5.4 5.0 - 12.0 % Final     Eosinophil %   Date Value Ref Range Status   04/13/2019 0.1 (L) 0.3 - 6.2 % Final     Basophil %   Date Value Ref Range Status   04/13/2019 0.3 0.0 - 1.5 % Final     Immature Grans %   Date Value Ref Range Status   04/13/2019 0.5 0.0 - 0.5 % Final     Neutrophils, Absolute   Date Value Ref Range Status   04/13/2019 14.69 (H) 1.40 - 7.00 10*3/mm3 Final     Lymphocytes, Absolute   Date Value Ref Range Status   04/13/2019 1.12 0.70 - 3.10 10*3/mm3 Final     Monocytes, Absolute   Date Value Ref Range Status   04/13/2019 0.91 (H) 0.10 - 0.90 10*3/mm3 Final     Eosinophils, Absolute   Date Value Ref Range Status   04/13/2019 0.01 0.00 - 0.40 10*3/mm3 Final     Basophils, Absolute   Date Value Ref Range Status   04/13/2019 0.05 0.00 - 0.20 10*3/mm3 Final     Immature Grans, Absolute   Date Value Ref Range Status   04/13/2019 0.08 (H) 0.00 - 0.05 10*3/mm3 Final         Acute on chronic respiratory failure  (CMS/[facility])      Assessment/Plan   Assessment/Plan:     84yoF admitted to in Hospice on 4/14/19 for acute on chronic respiratory failure.     - d/c oral medications    - scop patch    - robinul scheduled and prn    - d/c breathing tx    - please place faustin -- d/c pure wick    - oral care    - given prn usage - initiate dilaudid pca - basal rate 0.1mg/hr  has utilized about 2.5mg prn dilaudid in the past 12 hours. Will initiate pca for better overall pain mgmt. PPS 10%.    **Attended pt's death - family at bedside, tearful. Two dtrs, one son, grandson, luis carlos's  were all present. Support provided.     Total Visit Time: > 60min  Face to Face Time: 40min    Justification for care:  Patient meets criteria for acute in-patient care with required nursing assessment and interventions for symptoms with IV medications.      Niru Reed, JAY  (C) [phone]  (O) [phone]  04/14/19  3:05 PM

## 2019-04-14 NOTE — DISCHARGE SUMMARY
Date of Death: 4/14/19  Time of Death:  1307      Presenting Problem/History of Present Illness    Acute on chronic respiratory failure (CMS/[facility])      Hospital Course    Patient is a 84 y.o. female admitted from  overnight for acute respiratory failure. Patient seen in ED earlier in evening after falling out of bed - received stiches back of her head and sent back to .      PMHx: macular degeneration/legally blind; CAD s/p CABG, HTN, T2DM, Afib on coumadin (d/c previous admission 4/2019), CVA with chronic right hemiparesis, and rheumatic heart disease     ED admission workup revealed: BNP significantly elevated at 45,675. Troponins elevated at 4.53 and did trend down to 3.57. Potassium elevated (5.6). WBC elevated at 16. CTA obtained and was negative for PE; did show large bilateral pleural effusions with bibasilar compressive atelectasis. She was also noted to have acute to subacute right sided rib fractures with chronic left sided rib fractures.     Pt previously admitted to [facility] 4/4-4/11 for Acute on chronic respiratory failure with hypoxia and hypercapnia. At that time she was a resident of [facility]. She suffered a fall while getting off the toilet and presented to the emergency room with dyspnea, pleuritic chest pain, and significant hypoxia.  Workup there revealed evidence of a small right apical pneumothorax, multiple rib fractures on the right side, and lung infiltrate.  She was placed on BiPAP and admitted into the ICU.  That was eventually weaned to high flow nasal cannula.  No chest tube was required for the pneumothorax.  Subsequent chest x-ray showed worsening infiltrates on the right side and she was started on empiric antibiotics.      Given pt's overall deterioration clinically as well as in her quality of life, family ultimately elected for comfort measures. Ms. Magdaleno was admitted to in Hospice on 4/14/19 for acute on chronic respiratory failure.       Social  History:  Social History     Tobacco Use   • Smoking status: Former Smoker     Packs/day: 1.00     Years: 31.00     Pack years: 31.00     Types: Cigarettes     Start date: 1962     Last attempt to quit: 1993     Years since quittin.2   • Smokeless tobacco: Never Used   • Tobacco comment: quit after stroke.   Substance Use Topics   • Alcohol use: No         Consults:   Consults     Date and Time Order Name Status Description    2019 0806 Inpatient Palliative Care MD Consult Completed     2019 0030 Inpatient Palliative Care MD Consult Completed         Exam confirms with auscultation zero audible heart tones and zero audible respirations. Ms.Margaret Magdaleno was pronounced dead 2019 at 1307.  Family present at bedside.  MD notified by Patient's RN.     Suresh Leal RN  Clinical House Supervisor  2019 1:58 PM    JAY Bone  19  3:05 PM

## 2019-04-15 ENCOUNTER — EPISODE CHANGES (OUTPATIENT)
Dept: CASE MANAGEMENT | Facility: OTHER | Age: 84
End: 2019-04-15

## 2019-04-18 LAB
BACTERIA SPEC AEROBE CULT: NORMAL
BACTERIA SPEC AEROBE CULT: NORMAL

## 2020-04-09 NOTE — PROGRESS NOTES
Continued Stay Note   Carmela     Patient Name: Kristin Magdaleno  MRN: [id number]  Today's Date: 2/6/2018    Admit Date: 2/4/2018          Discharge Plan       02/06/18 1554    Case Management/Social Work Plan    Plan [facility] vs home with home health    Patient/Family In Agreement With Plan yes    Additional Comments Per Dacia with [facility], their MD is still reviewing patient's clinical information. Patient's Medicare status is being sent for review. Plan remains acute rehab or home with home health for now. CM will continue to follow.               Discharge Codes     None        Expected Discharge Date and Time     Expected Discharge Date Expected Discharge Time    Feb 6, 2018             Kristin Gant     Hospitalist progress note:  Service 04/09/2020     Chief complaint:  Bacteremia, likely secondary to an infected infusion port     68-year-old female under treatment with chemotherapy for breast cancer presented with fevers, left chest pain and small area of dehiscence and purulence drainage from her port site.  IR removed the port, blood cultures grew out Staph aureus and Dr. Alberts of Infectious Disease consulted.  Currently on Ancef     Vital signs are stable and she is afebrile  Cardiac and pulmonary exams are normal  Abdomen is soft, nontender, good bowel sounds     Impression and plan:  1. Likely bacteremia secondary to an infected infusion port.  Port removed by IR, appreciate consultation Dr. Alberts.  Bruce requested, but cardiology is not doing them due to the COVID crisis  2. Left chest power port skin wound dehisced, removed  3. Hypokalemia, supplemented.  4. Right breast cancer under the oncology care of Dr. Miller  5. Chronic anemia on chemotherapy, hemoglobin down to 6.3.   Epogen given yesterday, will follow with Dr. Miller of Oncology    Can discharge patient once social service is able to arrange for 3 times daily IV administration of Ancef at home    Patient's current Covid-19 transmissibility/isolation status: Meets criteria for deescalation of isolation precautions    CDC guidance (click here) for deescalation or discontinuation of isolation for persons with COVID-19     Isolation precautions  for patients may be deescalated if:  • Fever has resolved and other symptoms have improved for at least 3 days  • AND  • 7 days have passed since Covid-19 symptoms first appeared.    (As of 3/30/20, [facility] is not conducting test-based deescalation of isolation because of low testing capacity.)

## 2022-01-17 NOTE — PLAN OF CARE
Expected Patient Referral to ED  1:09 PM    Referring Clinic/Provider:  Walk-in clinic    Reason for referral/Clinical facts:  Atrial fibrillation with RVR rate 140, dyspnea.  Seen in early this morning.    Recommendations provided:  Send to ED for further evaluation    Caller was informed that this institution does not possess the capabilities and/or resources to provide for patient due to no ED beds available to to boarding.    Informed caller that recommendations provided are recommendations based only on the facts provided and that they responsible to accept or reject the advice, or to seek a formal in person consultation as needed and that this ED will see/treat patient should they arrive.      Delvis Jay MD  Emergency Medicine  [facility] EMERGENCY DEPARTMENT  [facility] [facility] [id number] [phone]       Delvis Jay MD  01/17/22 [id number]       Delvis Jay MD  01/17/22 7738     Problem: Skin Injury Risk (Adult)  Goal: Identify Related Risk Factors and Signs and Symptoms   04/10/19 0154   Skin Injury Risk (Adult)   Related Risk Factors (Skin Injury Risk) mobility impaired;moisture       Problem: Patient Care Overview  Goal: Plan of Care Review   04/10/19 0154   Coping/Psychosocial   Plan of Care Reviewed With patient   Plan of Care Review   Progress improving       Problem: Palliative Care (Adult)  Goal: Identify Related Risk Factors and Signs and Symptoms   04/10/19 0154   Palliative Care (Adult)   Palliative Care: Signs and Symptoms fatigue       Problem: Patient Care Overview  Goal: Plan of Care Review   04/10/19 0154   Coping/Psychosocial   Plan of Care Reviewed With patient   Plan of Care Review   Progress improving

## 2024-09-13 NOTE — TELEPHONE ENCOUNTER
Attending and PA/NP shared services statement (NON-critical care): Let's recheck in 2 weeks   Attending and PA/NP shared services statement (NON-critical care): Attending and PA/NP shared services statement (NON-critical care): Attending and PA/NP shared services statement (NON-critical care): Attending and PA/NP shared services statement (NON-critical care): Attending and PA/NP shared services statement (NON-critical care): Attending and PA/NP shared services statement (NON-critical care): Attending and PA/NP shared services statement (NON-critical care): Attending and PA/NP shared services statement (NON-critical care): Attending and PA/NP shared services statement (NON-critical care): Attending and PA/NP shared services statement (NON-critical care): Attending and PA/NP shared services statement (NON-critical care): Attending and PA/NP shared services statement (NON-critical care): Attending and PA/NP shared services statement (NON-critical care): Attending and PA/NP shared services statement (NON-critical care): Attending and PA/NP shared services statement (NON-critical care): Attending and PA/NP shared services statement (NON-critical care): Attending and PA/NP shared services statement (NON-critical care): Attending and PA/NP shared services statement (NON-critical care): Attending and PA/NP shared services statement (NON-critical care): Attending and PA/NP shared services statement (NON-critical care): Attending and PA/NP shared services statement (NON-critical care):